# Patient Record
Sex: FEMALE | Race: WHITE | HISPANIC OR LATINO
[De-identification: names, ages, dates, MRNs, and addresses within clinical notes are randomized per-mention and may not be internally consistent; named-entity substitution may affect disease eponyms.]

---

## 2017-01-03 ENCOUNTER — APPOINTMENT (OUTPATIENT)
Dept: OBGYN | Facility: CLINIC | Age: 57
End: 2017-01-03

## 2017-01-03 ENCOUNTER — OUTPATIENT (OUTPATIENT)
Dept: OUTPATIENT SERVICES | Facility: HOSPITAL | Age: 57
LOS: 1 days | End: 2017-01-03
Payer: SELF-PAY

## 2017-01-03 VITALS — BODY MASS INDEX: 29.12 KG/M2 | DIASTOLIC BLOOD PRESSURE: 62 MMHG | WEIGHT: 175 LBS | SYSTOLIC BLOOD PRESSURE: 100 MMHG

## 2017-01-03 DIAGNOSIS — Z01.419 ENCOUNTER FOR GYNECOLOGICAL EXAMINATION (GENERAL) (ROUTINE) WITHOUT ABNORMAL FINDINGS: ICD-10-CM

## 2017-01-03 DIAGNOSIS — N76.0 ACUTE VAGINITIS: ICD-10-CM

## 2017-01-03 PROCEDURE — G0463: CPT

## 2017-01-09 ENCOUNTER — APPOINTMENT (OUTPATIENT)
Dept: FAMILY MEDICINE | Facility: HOSPITAL | Age: 57
End: 2017-01-09

## 2017-01-09 ENCOUNTER — OUTPATIENT (OUTPATIENT)
Dept: OUTPATIENT SERVICES | Facility: HOSPITAL | Age: 57
LOS: 1 days | End: 2017-01-09
Payer: SELF-PAY

## 2017-01-09 VITALS
SYSTOLIC BLOOD PRESSURE: 122 MMHG | BODY MASS INDEX: 28.32 KG/M2 | HEIGHT: 65 IN | WEIGHT: 170 LBS | HEART RATE: 86 BPM | RESPIRATION RATE: 14 BRPM | DIASTOLIC BLOOD PRESSURE: 80 MMHG

## 2017-01-09 PROCEDURE — G0463: CPT

## 2017-01-11 ENCOUNTER — RX RENEWAL (OUTPATIENT)
Age: 57
End: 2017-01-11

## 2017-01-13 ENCOUNTER — MEDICATION RENEWAL (OUTPATIENT)
Age: 57
End: 2017-01-13

## 2017-01-16 ENCOUNTER — MEDICATION RENEWAL (OUTPATIENT)
Age: 57
End: 2017-01-16

## 2017-01-26 ENCOUNTER — OUTPATIENT (OUTPATIENT)
Dept: OUTPATIENT SERVICES | Facility: HOSPITAL | Age: 57
LOS: 1 days | End: 2017-01-26
Payer: SELF-PAY

## 2017-01-26 ENCOUNTER — APPOINTMENT (OUTPATIENT)
Dept: DERMATOLOGY | Facility: HOSPITAL | Age: 57
End: 2017-01-26

## 2017-01-26 VITALS
HEIGHT: 65 IN | DIASTOLIC BLOOD PRESSURE: 79 MMHG | SYSTOLIC BLOOD PRESSURE: 120 MMHG | HEART RATE: 80 BPM | RESPIRATION RATE: 14 BRPM | BODY MASS INDEX: 28.32 KG/M2 | WEIGHT: 170 LBS

## 2017-01-26 DIAGNOSIS — L81.4 OTHER MELANIN HYPERPIGMENTATION: ICD-10-CM

## 2017-01-26 DIAGNOSIS — L30.9 DERMATITIS, UNSPECIFIED: ICD-10-CM

## 2017-01-26 DIAGNOSIS — L98.9 DISORDER OF THE SKIN AND SUBCUTANEOUS TISSUE, UNSPECIFIED: ICD-10-CM

## 2017-01-26 PROCEDURE — G0463: CPT

## 2017-01-26 RX ORDER — MICONAZOLE NITRATE 20 MG/G
2 CREAM TOPICAL TWICE DAILY
Qty: 1 | Refills: 1 | Status: DISCONTINUED | COMMUNITY
Start: 2017-01-09 | End: 2017-01-26

## 2017-01-28 ENCOUNTER — APPOINTMENT (OUTPATIENT)
Dept: MAMMOGRAPHY | Facility: IMAGING CENTER | Age: 57
End: 2017-01-28

## 2017-01-28 ENCOUNTER — OUTPATIENT (OUTPATIENT)
Dept: OUTPATIENT SERVICES | Facility: HOSPITAL | Age: 57
LOS: 1 days | End: 2017-01-28
Payer: COMMERCIAL

## 2017-01-28 DIAGNOSIS — C50.919 MALIGNANT NEOPLASM OF UNSPECIFIED SITE OF UNSPECIFIED FEMALE BREAST: ICD-10-CM

## 2017-01-28 PROCEDURE — 77063 BREAST TOMOSYNTHESIS BI: CPT

## 2017-01-28 PROCEDURE — 77067 SCR MAMMO BI INCL CAD: CPT

## 2017-03-30 ENCOUNTER — APPOINTMENT (OUTPATIENT)
Dept: FAMILY MEDICINE | Facility: HOSPITAL | Age: 57
End: 2017-03-30

## 2017-03-30 ENCOUNTER — OUTPATIENT (OUTPATIENT)
Dept: OUTPATIENT SERVICES | Facility: HOSPITAL | Age: 57
LOS: 1 days | End: 2017-03-30
Payer: SELF-PAY

## 2017-03-30 VITALS
WEIGHT: 168 LBS | BODY MASS INDEX: 27.96 KG/M2 | RESPIRATION RATE: 16 BRPM | TEMPERATURE: 97.8 F | OXYGEN SATURATION: 98 % | HEART RATE: 67 BPM | DIASTOLIC BLOOD PRESSURE: 70 MMHG | SYSTOLIC BLOOD PRESSURE: 113 MMHG

## 2017-03-30 PROCEDURE — 80061 LIPID PANEL: CPT

## 2017-03-30 PROCEDURE — 85025 COMPLETE CBC W/AUTO DIFF WBC: CPT

## 2017-03-30 PROCEDURE — 36415 COLL VENOUS BLD VENIPUNCTURE: CPT

## 2017-03-30 PROCEDURE — 83036 HEMOGLOBIN GLYCOSYLATED A1C: CPT

## 2017-03-30 PROCEDURE — 80053 COMPREHEN METABOLIC PANEL: CPT

## 2017-03-30 PROCEDURE — G0463: CPT

## 2017-03-30 PROCEDURE — 82306 VITAMIN D 25 HYDROXY: CPT

## 2017-04-11 ENCOUNTER — APPOINTMENT (OUTPATIENT)
Dept: GASTROENTEROLOGY | Facility: HOSPITAL | Age: 57
End: 2017-04-11

## 2017-04-11 ENCOUNTER — OUTPATIENT (OUTPATIENT)
Dept: OUTPATIENT SERVICES | Facility: HOSPITAL | Age: 57
LOS: 1 days | End: 2017-04-11
Payer: SELF-PAY

## 2017-04-11 VITALS
HEART RATE: 83 BPM | DIASTOLIC BLOOD PRESSURE: 78 MMHG | RESPIRATION RATE: 14 BRPM | HEIGHT: 65 IN | SYSTOLIC BLOOD PRESSURE: 118 MMHG | WEIGHT: 168 LBS | BODY MASS INDEX: 27.99 KG/M2

## 2017-04-11 DIAGNOSIS — Z08 ENCOUNTER FOR FOLLOW-UP EXAMINATION AFTER COMPLETED TREATMENT FOR MALIGNANT NEOPLASM: ICD-10-CM

## 2017-04-11 DIAGNOSIS — R10.9 UNSPECIFIED ABDOMINAL PAIN: ICD-10-CM

## 2017-04-11 DIAGNOSIS — K80.50 CALCULUS OF BILE DUCT WITHOUT CHOLANGITIS OR CHOLECYSTITIS WITHOUT OBSTRUCTION: ICD-10-CM

## 2017-04-11 DIAGNOSIS — K31.9 DISEASE OF STOMACH AND DUODENUM, UNSPECIFIED: ICD-10-CM

## 2017-04-11 PROCEDURE — G0463: CPT

## 2017-04-11 RX ORDER — MAGNESIUM CITRATE
SOLUTION, ORAL ORAL
Qty: 2 | Refills: 0 | Status: DISCONTINUED | COMMUNITY
Start: 2017-04-11 | End: 2017-04-11

## 2017-04-11 RX ORDER — BISACODYL 5 MG/1
5 TABLET ORAL
Qty: 2 | Refills: 0 | Status: DISCONTINUED | COMMUNITY
Start: 2017-04-11 | End: 2017-04-11

## 2017-04-26 ENCOUNTER — APPOINTMENT (OUTPATIENT)
Dept: FAMILY MEDICINE | Facility: HOSPITAL | Age: 57
End: 2017-04-26

## 2017-04-26 ENCOUNTER — OUTPATIENT (OUTPATIENT)
Dept: OUTPATIENT SERVICES | Facility: HOSPITAL | Age: 57
LOS: 1 days | End: 2017-04-26
Payer: SELF-PAY

## 2017-04-26 VITALS
HEART RATE: 70 BPM | BODY MASS INDEX: 28.82 KG/M2 | RESPIRATION RATE: 16 BRPM | TEMPERATURE: 97.1 F | OXYGEN SATURATION: 98 % | SYSTOLIC BLOOD PRESSURE: 135 MMHG | HEIGHT: 65 IN | WEIGHT: 173 LBS | DIASTOLIC BLOOD PRESSURE: 88 MMHG

## 2017-04-26 PROCEDURE — G0463: CPT

## 2017-05-17 ENCOUNTER — OUTPATIENT (OUTPATIENT)
Dept: OUTPATIENT SERVICES | Facility: HOSPITAL | Age: 57
LOS: 1 days | End: 2017-05-17
Payer: SELF-PAY

## 2017-05-17 ENCOUNTER — RESULT REVIEW (OUTPATIENT)
Age: 57
End: 2017-05-17

## 2017-05-17 DIAGNOSIS — R10.9 UNSPECIFIED ABDOMINAL PAIN: ICD-10-CM

## 2017-05-17 PROCEDURE — 88312 SPECIAL STAINS GROUP 1: CPT

## 2017-05-17 PROCEDURE — 88305 TISSUE EXAM BY PATHOLOGIST: CPT

## 2017-05-17 PROCEDURE — 43239 EGD BIOPSY SINGLE/MULTIPLE: CPT

## 2017-05-17 PROCEDURE — 88305 TISSUE EXAM BY PATHOLOGIST: CPT | Mod: 26

## 2017-05-17 PROCEDURE — 88312 SPECIAL STAINS GROUP 1: CPT | Mod: 26

## 2017-05-23 ENCOUNTER — OTHER (OUTPATIENT)
Age: 57
End: 2017-05-23

## 2017-06-20 ENCOUNTER — APPOINTMENT (OUTPATIENT)
Dept: GASTROENTEROLOGY | Facility: HOSPITAL | Age: 57
End: 2017-06-20

## 2017-06-20 ENCOUNTER — OUTPATIENT (OUTPATIENT)
Dept: OUTPATIENT SERVICES | Facility: HOSPITAL | Age: 57
LOS: 1 days | End: 2017-06-20
Payer: SELF-PAY

## 2017-06-20 VITALS
HEART RATE: 80 BPM | WEIGHT: 176 LBS | RESPIRATION RATE: 16 BRPM | BODY MASS INDEX: 29.32 KG/M2 | HEIGHT: 65 IN | SYSTOLIC BLOOD PRESSURE: 129 MMHG | DIASTOLIC BLOOD PRESSURE: 80 MMHG

## 2017-06-20 DIAGNOSIS — R10.9 UNSPECIFIED ABDOMINAL PAIN: ICD-10-CM

## 2017-06-20 DIAGNOSIS — E66.9 OBESITY, UNSPECIFIED: ICD-10-CM

## 2017-06-20 DIAGNOSIS — K80.50 CALCULUS OF BILE DUCT WITHOUT CHOLANGITIS OR CHOLECYSTITIS WITHOUT OBSTRUCTION: ICD-10-CM

## 2017-06-20 DIAGNOSIS — K76.0 FATTY (CHANGE OF) LIVER, NOT ELSEWHERE CLASSIFIED: ICD-10-CM

## 2017-06-20 DIAGNOSIS — K31.9 DISEASE OF STOMACH AND DUODENUM, UNSPECIFIED: ICD-10-CM

## 2017-06-20 DIAGNOSIS — A04.8 OTHER SPECIFIED BACTERIAL INTESTINAL INFECTIONS: ICD-10-CM

## 2017-06-20 PROCEDURE — G0463: CPT

## 2017-06-20 RX ORDER — CLARITHROMYCIN 500 MG/1
500 TABLET, FILM COATED ORAL TWICE DAILY
Qty: 28 | Refills: 0 | Status: DISCONTINUED | COMMUNITY
Start: 2017-05-23 | End: 2017-06-20

## 2017-06-20 RX ORDER — AMOXICILLIN 500 MG/1
500 CAPSULE ORAL TWICE DAILY
Qty: 56 | Refills: 0 | Status: DISCONTINUED | COMMUNITY
Start: 2017-05-23 | End: 2017-06-20

## 2017-06-21 LAB
HBA1C MFR BLD HPLC: 6.2
LDLC SERPL DIRECT ASSAY-MCNC: 88

## 2017-07-20 ENCOUNTER — APPOINTMENT (OUTPATIENT)
Dept: OTOLARYNGOLOGY | Facility: CLINIC | Age: 57
End: 2017-07-20

## 2017-07-20 ENCOUNTER — OUTPATIENT (OUTPATIENT)
Dept: OUTPATIENT SERVICES | Facility: HOSPITAL | Age: 57
LOS: 1 days | End: 2017-07-20
Payer: SELF-PAY

## 2017-07-20 ENCOUNTER — OUTPATIENT (OUTPATIENT)
Dept: OUTPATIENT SERVICES | Facility: HOSPITAL | Age: 57
LOS: 1 days | Discharge: HOME | End: 2017-07-20

## 2017-07-20 VITALS
HEIGHT: 65 IN | DIASTOLIC BLOOD PRESSURE: 85 MMHG | SYSTOLIC BLOOD PRESSURE: 125 MMHG | WEIGHT: 172 LBS | HEART RATE: 80 BPM | BODY MASS INDEX: 28.66 KG/M2

## 2017-07-20 DIAGNOSIS — A04.8 OTHER SPECIFIED BACTERIAL INTESTINAL INFECTIONS: ICD-10-CM

## 2017-07-20 DIAGNOSIS — R10.9 UNSPECIFIED ABDOMINAL PAIN: ICD-10-CM

## 2017-07-20 DIAGNOSIS — K76.0 FATTY (CHANGE OF) LIVER, NOT ELSEWHERE CLASSIFIED: ICD-10-CM

## 2017-07-20 DIAGNOSIS — K80.50 CALCULUS OF BILE DUCT WITHOUT CHOLANGITIS OR CHOLECYSTITIS WITHOUT OBSTRUCTION: ICD-10-CM

## 2017-07-20 DIAGNOSIS — E66.9 OBESITY, UNSPECIFIED: ICD-10-CM

## 2017-07-20 PROCEDURE — 87338 HPYLORI STOOL AG IA: CPT

## 2017-07-23 LAB — H PYLORI AG STL QL: SIGNIFICANT CHANGE UP

## 2017-08-07 ENCOUNTER — MEDICATION RENEWAL (OUTPATIENT)
Age: 57
End: 2017-08-07

## 2017-08-08 ENCOUNTER — APPOINTMENT (OUTPATIENT)
Dept: GASTROENTEROLOGY | Facility: HOSPITAL | Age: 57
End: 2017-08-08

## 2017-08-08 ENCOUNTER — OUTPATIENT (OUTPATIENT)
Dept: OUTPATIENT SERVICES | Facility: HOSPITAL | Age: 57
LOS: 1 days | End: 2017-08-08
Payer: SELF-PAY

## 2017-08-08 VITALS
BODY MASS INDEX: 28.49 KG/M2 | SYSTOLIC BLOOD PRESSURE: 131 MMHG | HEART RATE: 89 BPM | DIASTOLIC BLOOD PRESSURE: 85 MMHG | HEIGHT: 65 IN | WEIGHT: 171 LBS

## 2017-08-08 DIAGNOSIS — R10.9 UNSPECIFIED ABDOMINAL PAIN: ICD-10-CM

## 2017-08-08 DIAGNOSIS — K21.9 GASTRO-ESOPHAGEAL REFLUX DISEASE WITHOUT ESOPHAGITIS: ICD-10-CM

## 2017-08-08 PROCEDURE — G0463: CPT

## 2017-08-10 ENCOUNTER — APPOINTMENT (OUTPATIENT)
Dept: FAMILY MEDICINE | Facility: HOSPITAL | Age: 57
End: 2017-08-10

## 2017-08-10 ENCOUNTER — OUTPATIENT (OUTPATIENT)
Dept: OUTPATIENT SERVICES | Facility: HOSPITAL | Age: 57
LOS: 1 days | End: 2017-08-10
Payer: SELF-PAY

## 2017-08-10 VITALS
DIASTOLIC BLOOD PRESSURE: 81 MMHG | RESPIRATION RATE: 15 BRPM | HEIGHT: 65 IN | WEIGHT: 170 LBS | TEMPERATURE: 97.4 F | SYSTOLIC BLOOD PRESSURE: 119 MMHG | HEART RATE: 78 BPM | BODY MASS INDEX: 28.32 KG/M2 | OXYGEN SATURATION: 97 %

## 2017-08-10 PROCEDURE — G0463: CPT

## 2017-09-06 ENCOUNTER — EMERGENCY (EMERGENCY)
Facility: HOSPITAL | Age: 57
LOS: 1 days | Discharge: HOME | End: 2017-09-06
Attending: EMERGENCY MEDICINE | Admitting: EMERGENCY MEDICINE
Payer: SELF-PAY

## 2017-09-06 VITALS
TEMPERATURE: 98 F | SYSTOLIC BLOOD PRESSURE: 114 MMHG | HEART RATE: 64 BPM | DIASTOLIC BLOOD PRESSURE: 75 MMHG | OXYGEN SATURATION: 97 % | RESPIRATION RATE: 17 BRPM

## 2017-09-06 VITALS
DIASTOLIC BLOOD PRESSURE: 63 MMHG | HEART RATE: 74 BPM | WEIGHT: 169.98 LBS | RESPIRATION RATE: 16 BRPM | OXYGEN SATURATION: 99 % | TEMPERATURE: 97 F | SYSTOLIC BLOOD PRESSURE: 144 MMHG

## 2017-09-06 DIAGNOSIS — Q24.5 MALFORMATION OF CORONARY VESSELS: ICD-10-CM

## 2017-09-06 LAB
ALBUMIN SERPL ELPH-MCNC: 4.4 G/DL — SIGNIFICANT CHANGE UP (ref 3.3–5)
ALP SERPL-CCNC: 88 U/L — SIGNIFICANT CHANGE UP (ref 40–120)
ALT FLD-CCNC: 18 U/L RC — SIGNIFICANT CHANGE UP (ref 10–45)
ANION GAP SERPL CALC-SCNC: 17 MMOL/L — SIGNIFICANT CHANGE UP (ref 5–17)
AST SERPL-CCNC: 19 U/L — SIGNIFICANT CHANGE UP (ref 10–40)
BILIRUB SERPL-MCNC: 0.2 MG/DL — SIGNIFICANT CHANGE UP (ref 0.2–1.2)
BUN SERPL-MCNC: 11 MG/DL — SIGNIFICANT CHANGE UP (ref 7–23)
CALCIUM SERPL-MCNC: 9.8 MG/DL — SIGNIFICANT CHANGE UP (ref 8.4–10.5)
CHLORIDE SERPL-SCNC: 100 MMOL/L — SIGNIFICANT CHANGE UP (ref 96–108)
CK MB CFR SERPL CALC: 1.1 NG/ML — SIGNIFICANT CHANGE UP (ref 0–3.8)
CK MB CFR SERPL CALC: <1 NG/ML — SIGNIFICANT CHANGE UP (ref 0–3.8)
CK SERPL-CCNC: 74 U/L — SIGNIFICANT CHANGE UP (ref 25–170)
CK SERPL-CCNC: 79 U/L — SIGNIFICANT CHANGE UP (ref 25–170)
CO2 SERPL-SCNC: 25 MMOL/L — SIGNIFICANT CHANGE UP (ref 22–31)
CREAT SERPL-MCNC: 0.65 MG/DL — SIGNIFICANT CHANGE UP (ref 0.5–1.3)
GLUCOSE SERPL-MCNC: 104 MG/DL — HIGH (ref 70–99)
HCT VFR BLD CALC: 40.4 % — SIGNIFICANT CHANGE UP (ref 34.5–45)
HGB BLD-MCNC: 14.1 G/DL — SIGNIFICANT CHANGE UP (ref 11.5–15.5)
MAGNESIUM SERPL-MCNC: 2.1 MG/DL — SIGNIFICANT CHANGE UP (ref 1.6–2.6)
MCHC RBC-ENTMCNC: 32.5 PG — SIGNIFICANT CHANGE UP (ref 27–34)
MCHC RBC-ENTMCNC: 35 GM/DL — SIGNIFICANT CHANGE UP (ref 32–36)
MCV RBC AUTO: 92.9 FL — SIGNIFICANT CHANGE UP (ref 80–100)
PLATELET # BLD AUTO: 220 K/UL — SIGNIFICANT CHANGE UP (ref 150–400)
POTASSIUM SERPL-MCNC: 3.7 MMOL/L — SIGNIFICANT CHANGE UP (ref 3.5–5.3)
POTASSIUM SERPL-SCNC: 3.7 MMOL/L — SIGNIFICANT CHANGE UP (ref 3.5–5.3)
PROT SERPL-MCNC: 7.7 G/DL — SIGNIFICANT CHANGE UP (ref 6–8.3)
RBC # BLD: 4.35 M/UL — SIGNIFICANT CHANGE UP (ref 3.8–5.2)
RBC # FLD: 11.5 % — SIGNIFICANT CHANGE UP (ref 10.3–14.5)
SODIUM SERPL-SCNC: 142 MMOL/L — SIGNIFICANT CHANGE UP (ref 135–145)
TROPONIN T SERPL-MCNC: <0.01 NG/ML — SIGNIFICANT CHANGE UP (ref 0–0.06)
TROPONIN T SERPL-MCNC: <0.01 NG/ML — SIGNIFICANT CHANGE UP (ref 0–0.06)
WBC # BLD: 5.9 K/UL — SIGNIFICANT CHANGE UP (ref 3.8–10.5)
WBC # FLD AUTO: 5.9 K/UL — SIGNIFICANT CHANGE UP (ref 3.8–10.5)

## 2017-09-06 PROCEDURE — 71046 X-RAY EXAM CHEST 2 VIEWS: CPT

## 2017-09-06 PROCEDURE — 99285 EMERGENCY DEPT VISIT HI MDM: CPT | Mod: GC

## 2017-09-06 PROCEDURE — 80053 COMPREHEN METABOLIC PANEL: CPT

## 2017-09-06 PROCEDURE — 83735 ASSAY OF MAGNESIUM: CPT

## 2017-09-06 PROCEDURE — 93010 ELECTROCARDIOGRAM REPORT: CPT

## 2017-09-06 PROCEDURE — 82553 CREATINE MB FRACTION: CPT

## 2017-09-06 PROCEDURE — 71020: CPT | Mod: 26

## 2017-09-06 PROCEDURE — 85027 COMPLETE CBC AUTOMATED: CPT

## 2017-09-06 PROCEDURE — 82550 ASSAY OF CK (CPK): CPT

## 2017-09-06 PROCEDURE — 99283 EMERGENCY DEPT VISIT LOW MDM: CPT | Mod: 25

## 2017-09-06 PROCEDURE — 93005 ELECTROCARDIOGRAM TRACING: CPT

## 2017-09-06 PROCEDURE — 99285 EMERGENCY DEPT VISIT HI MDM: CPT | Mod: 25

## 2017-09-06 PROCEDURE — 84484 ASSAY OF TROPONIN QUANT: CPT

## 2017-09-06 RX ORDER — ASPIRIN/CALCIUM CARB/MAGNESIUM 324 MG
325 TABLET ORAL ONCE
Qty: 0 | Refills: 0 | Status: COMPLETED | OUTPATIENT
Start: 2017-09-06 | End: 2017-09-06

## 2017-09-06 RX ADMIN — Medication 325 MILLIGRAM(S): at 14:56

## 2017-09-06 NOTE — ED PROVIDER NOTE - OBJECTIVE STATEMENT
57 year old woman PMH DM2, DLD, breast cancer s/p chemo p/w left arm pain. Says that for past 3 hours has had left arm and left chest heaviness, tingling. Called her cardiologist and was told to go to ED. No recent exertional symptoms, cough, fever/chills, orthopnea, LE edema. Had negative nuclear stress test 1/2016. No VTE risk factors.

## 2017-09-06 NOTE — CONSULT NOTE ADULT - ATTENDING COMMENTS
57 year old woman with known LAD intramyocardial bridge, found incidentally on evaluation a year ago for atypical chest pain. Symptoms from that time have not been recurrent and is no specific management, has required no specific management. Current symptoms are different, not characteristic of angina and not related to the myocardial bridge. No further cardiac testing or change in cardiac management indicated.

## 2017-09-06 NOTE — ED ADULT NURSE NOTE - PSH
History of Appendectomy    History of Right Mastectomy    S/P Breast Reconstruction  2006  S/P Sclerotherapy of Varicose Veins

## 2017-09-06 NOTE — CONSULT NOTE ADULT - ASSESSMENT
57 year old Female with mid LAD intramyocardial bridging on metoprolol succinate, T2DM, HLD presented with Left arm, Left thorax, left facial numbness and tingling.

## 2017-09-06 NOTE — ED PROVIDER NOTE - PROGRESS NOTE DETAILS
d/w cardiology fellow will see pt seen by cardiology clear for d/c pt unwilling to wait for troponin result. understands that there is a chance that the troponin could be elevated which would require further intervention. understands that leaving puts her at risk of serious cardiac event or death. pt still requesting to leave. will sign out AMA.

## 2017-09-06 NOTE — ED ADULT NURSE NOTE - OBJECTIVE STATEMENT
57 y.o female arrived to ED c.o tingling to left arm x3 hours. Pt states she also has tingling in her chest and head but feels it most significantly in arm. Pt a&ox4, neg slurred speech, neg facial droop, neg extremity drift, equal  strength, neg sob, neg chest pain, abd soft nontender, pulse motor sensoryx4, pt states that when she ambulates she drifts to the right, skin warm dry intact. 18g IV placed MD YULIYA assessing pt at bedside,

## 2017-09-06 NOTE — ED PROVIDER NOTE - ATTENDING CONTRIBUTION TO CARE
Dr. Baum : I have personally seen and examined this patient at the bedside. I have fully participated in the care of this patient. I have reviewed all pertinent clinical information, including history, physical exam, plan and the Fellow's note and agree except as noted.     56yo F hx of DM, HLD, CAD , breast ca in remission p/w left arm numbness/tingling and left sided cp that last few seconds 3 hours ago.   Denies f/c/n/v/sob/palpitations/cough/abd.pain/d/c/dysuria/hematuria. no sick contacts/recent travel. no hx of dvt/pe    PE:  head; atraumatic normocephalic  eyes: perrla  Heart: rrr s1s2  lungs: ctab  abd: soft, nt nd + bs no rebound/guarding no cva ttp  le: no swelling no calf ttp ue: strength 5/5 in upper ext  back: no midline cervical/thoracic/lumbar ttp  neuro: no focal neuro deficit    -->acs vs pna unlikely PE no risk factors no sob; unlikely dissection radial pulse 2+ bl no cp radiating to the back VS wnl---will fu 2 trops; ekg wnl--plan if 2 trops neg to dc with outpatient Cardiology follow up

## 2017-09-06 NOTE — CONSULT NOTE ADULT - SUBJECTIVE AND OBJECTIVE BOX
HPI: 57 year old Female with Mid LAD intramyocardial bridging metoprolol, T2DM on metformin, HLD on simvastatin, hx of breast cancer s/p chemotherapy 2006 with R. mastectomy p/w L arm, L thorax, L facial tingling. Patient stated numbness and tingling started 3 hours ago, lasted for few seconds, resolved on its own. Symptoms were assocated with left arm weakness that resolved as well. Denied prior episodes. Patient called her cardiologist who told her to come to ED. Patient denied chest pain, diaphoresis, palpitations, SOB, dyspnea, leg swelling, visual changes, slurred speech, back pain, f/c/n/v/d. Pt follows Dr. Hawthorne outpatient for known mLAD myocardial bridging, has a follow up appointment on the 9/14. Last treadmill stress test in 1/16 showed no arrythmia or significant EKG changes from baseline. Previous Echo in 11/15 showed preserved EF with normal LV function.    PAST MEDICAL & SURGICAL HISTORY:  CAD (coronary artery disease)  High Cholesterol  Breast Cancer: 2006 right mastectomy,  followed by chemo  Diabetes Mellitus Type II  S/P Sclerotherapy of Varicose Veins  S/P Breast Reconstruction: 2006  History of Appendectomy  History of Right Mastectomy    FAMILY HISTORY:  Family history of colon cancer (Mother)  No pertinent cardiac history    SOCIAL HISTORY:  Denied alcohol, smoking, drug use    HOME MEDICATIONS:  Metoprolol 12.5 mg BID  Simvastatin 10 mg daily  Metformin 1000 mg BID  Escitalopram 5 mg daily    REVIEW OF SYSTEMS:    CONSTITUTIONAL: No weakness, fevers or chills  EYES/ENT: No visual changes;  No vertigo or throat pain   NECK: No pain or stiffness  RESPIRATORY: No cough, wheezing, hemoptysis; No shortness of breath  CARDIOVASCULAR: No chest pain or palpitations  GASTROINTESTINAL: No abdominal or epigastric pain. No nausea, vomiting, or hematemesis; No diarrhea or constipation. No melena or hematochezia.  GENITOURINARY: No dysuria, frequency or hematuria  NEUROLOGICAL: No numbness or weakness  SKIN: No itching, burning, rashes, or lesions   All other review of systems is negative unless indicated above.    PHYSICAL EXAM:    Vital Signs Last 24 Hrs  T(C): 36.7 (06 Sep 2017 15:59), Max: 36.7 (06 Sep 2017 15:59)  T(F): 98 (06 Sep 2017 15:59), Max: 98 (06 Sep 2017 15:59)  HR: 64 (06 Sep 2017 15:59) (64 - 74)  BP: 114/75 (06 Sep 2017 15:59) (114/75 - 146/86)  RR: 17 (06 Sep 2017 15:59) (16 - 18)  SpO2: 97% (06 Sep 2017 15:59) (97% - 99%)      General:  NAD  Neurology: A&Ox3, nonfocal, CN II-XII intact  Eyes: PERRLA/ EOMI, Gross vision intact  ENT/Neck: Neck supple, trachea midline, No JVD, Gross hearing intact  Respiratory: CTA B/L, No wheezing, rales, rhonchi  CV: RRR, S1S2, no murmurs, rubs or gallops  Abdominal: Soft, NT, ND +BS,   Extremities: No edema, + peripheral pulses  Skin: No Rashes, Hematoma, Ecchymosis    LABS:                        14.1   5.9   )-----------( 220      ( 06 Sep 2017 14:44 )             40.4     09-06    142  |  100  |  11  ----------------------------<  104<H>  3.7   |  25  |  0.65    Ca    9.8      06 Sep 2017 14:44  Mg     2.1     09-06    TPro  7.7  /  Alb  4.4  /  TBili  0.2  /  DBili  x   /  AST  19  /  ALT  18  /  AlkPhos  88  09-06    CARDIAC MARKERS ( 06 Sep 2017 14:44 )  x     / x     / 79 U/L / x     / 1.1 ng/mL    EKG:  Normal Sinus Rhythm, no ST wave abnormalities or T-wave inversions noted

## 2017-09-06 NOTE — CONSULT NOTE ADULT - PROBLEM SELECTOR RECOMMENDATION 9
Continue metoprolol succinate 12.5 mg BID  EKG WNL  Cardiac markers pending, CXR pending  No further cardiac work up     Brittany Alcala, PGY-1  Pager #[phone]

## 2017-09-06 NOTE — ED ADULT NURSE NOTE - PMH
Breast Cancer  2006 right mastectomy,  followed by chemo  CAD (coronary artery disease)    Diabetes Mellitus Type II    High Cholesterol

## 2017-09-07 ENCOUNTER — OUTPATIENT (OUTPATIENT)
Dept: OUTPATIENT SERVICES | Facility: HOSPITAL | Age: 57
LOS: 1 days | Discharge: HOME | End: 2017-09-07

## 2017-09-07 DIAGNOSIS — C50.919 MALIGNANT NEOPLASM OF UNSPECIFIED SITE OF UNSPECIFIED FEMALE BREAST: ICD-10-CM

## 2017-09-11 ENCOUNTER — RESULT REVIEW (OUTPATIENT)
Age: 57
End: 2017-09-11

## 2017-09-11 ENCOUNTER — APPOINTMENT (OUTPATIENT)
Dept: HEMATOLOGY ONCOLOGY | Facility: CLINIC | Age: 57
End: 2017-09-11

## 2017-09-11 VITALS
HEART RATE: 64 BPM | TEMPERATURE: 98.3 F | OXYGEN SATURATION: 99 % | DIASTOLIC BLOOD PRESSURE: 76 MMHG | SYSTOLIC BLOOD PRESSURE: 124 MMHG | WEIGHT: 172.4 LBS | RESPIRATION RATE: 16 BRPM | BODY MASS INDEX: 28.69 KG/M2

## 2017-09-11 LAB
ALBUMIN SERPL ELPH-MCNC: 4.6 G/DL — SIGNIFICANT CHANGE UP (ref 3.3–5)
ALP SERPL-CCNC: 82 U/L — SIGNIFICANT CHANGE UP (ref 40–120)
ALT FLD-CCNC: 15 U/L — SIGNIFICANT CHANGE UP (ref 10–45)
ANION GAP SERPL CALC-SCNC: 15 MMOL/L — SIGNIFICANT CHANGE UP (ref 5–17)
AST SERPL-CCNC: 15 U/L — SIGNIFICANT CHANGE UP (ref 10–40)
BASOPHILS # BLD AUTO: 0.1 K/UL — SIGNIFICANT CHANGE UP (ref 0–0.2)
BASOPHILS NFR BLD AUTO: 1.1 % — SIGNIFICANT CHANGE UP (ref 0–2)
BILIRUB SERPL-MCNC: 0.2 MG/DL — SIGNIFICANT CHANGE UP (ref 0.2–1.2)
BUN SERPL-MCNC: 15 MG/DL — SIGNIFICANT CHANGE UP (ref 7–23)
CALCIUM SERPL-MCNC: 9.3 MG/DL — SIGNIFICANT CHANGE UP (ref 8.4–10.5)
CHLORIDE SERPL-SCNC: 104 MMOL/L — SIGNIFICANT CHANGE UP (ref 96–108)
CO2 SERPL-SCNC: 24 MMOL/L — SIGNIFICANT CHANGE UP (ref 22–31)
CREAT SERPL-MCNC: 0.63 MG/DL — SIGNIFICANT CHANGE UP (ref 0.5–1.3)
EOSINOPHIL # BLD AUTO: 0 K/UL — SIGNIFICANT CHANGE UP (ref 0–0.5)
EOSINOPHIL NFR BLD AUTO: 0.7 % — SIGNIFICANT CHANGE UP (ref 0–6)
GLUCOSE SERPL-MCNC: 96 MG/DL — SIGNIFICANT CHANGE UP (ref 70–99)
LYMPHOCYTES # BLD AUTO: 2.1 K/UL — SIGNIFICANT CHANGE UP (ref 1–3.3)
LYMPHOCYTES # BLD AUTO: 35.8 % — SIGNIFICANT CHANGE UP (ref 13–44)
MONOCYTES # BLD AUTO: 0.4 K/UL — SIGNIFICANT CHANGE UP (ref 0–0.9)
MONOCYTES NFR BLD AUTO: 6.7 % — SIGNIFICANT CHANGE UP (ref 2–14)
NEUTROPHILS # BLD AUTO: 3.3 K/UL — SIGNIFICANT CHANGE UP (ref 1.8–7.4)
NEUTROPHILS NFR BLD AUTO: 55.8 % — SIGNIFICANT CHANGE UP (ref 43–77)
POTASSIUM SERPL-MCNC: 4.5 MMOL/L — SIGNIFICANT CHANGE UP (ref 3.5–5.3)
POTASSIUM SERPL-SCNC: 4.5 MMOL/L — SIGNIFICANT CHANGE UP (ref 3.5–5.3)
PROT SERPL-MCNC: 7.4 G/DL — SIGNIFICANT CHANGE UP (ref 6–8.3)
SODIUM SERPL-SCNC: 143 MMOL/L — SIGNIFICANT CHANGE UP (ref 135–145)

## 2017-09-14 ENCOUNTER — APPOINTMENT (OUTPATIENT)
Dept: FAMILY MEDICINE | Facility: HOSPITAL | Age: 57
End: 2017-09-14

## 2017-09-14 ENCOUNTER — APPOINTMENT (OUTPATIENT)
Dept: CARDIOLOGY | Facility: HOSPITAL | Age: 57
End: 2017-09-14

## 2017-09-14 ENCOUNTER — MED ADMIN CHARGE (OUTPATIENT)
Age: 57
End: 2017-09-14

## 2017-09-14 ENCOUNTER — NON-APPOINTMENT (OUTPATIENT)
Age: 57
End: 2017-09-14

## 2017-09-14 ENCOUNTER — OUTPATIENT (OUTPATIENT)
Dept: OUTPATIENT SERVICES | Facility: HOSPITAL | Age: 57
LOS: 1 days | End: 2017-09-14
Payer: SELF-PAY

## 2017-09-14 VITALS
BODY MASS INDEX: 27.99 KG/M2 | HEIGHT: 65 IN | HEART RATE: 78 BPM | DIASTOLIC BLOOD PRESSURE: 78 MMHG | SYSTOLIC BLOOD PRESSURE: 114 MMHG | WEIGHT: 168 LBS | OXYGEN SATURATION: 98 %

## 2017-09-14 VITALS
DIASTOLIC BLOOD PRESSURE: 81 MMHG | BODY MASS INDEX: 27.79 KG/M2 | RESPIRATION RATE: 16 BRPM | HEART RATE: 67 BPM | TEMPERATURE: 98.7 F | OXYGEN SATURATION: 98 % | WEIGHT: 167 LBS | SYSTOLIC BLOOD PRESSURE: 118 MMHG

## 2017-09-14 DIAGNOSIS — I25.10 ATHEROSCLEROTIC HEART DISEASE OF NATIVE CORONARY ARTERY WITHOUT ANGINA PECTORIS: ICD-10-CM

## 2017-09-14 PROCEDURE — 93005 ELECTROCARDIOGRAM TRACING: CPT

## 2017-09-14 PROCEDURE — 80061 LIPID PANEL: CPT

## 2017-09-14 PROCEDURE — G0463: CPT

## 2017-09-14 PROCEDURE — G0008: CPT

## 2017-09-14 PROCEDURE — 84443 ASSAY THYROID STIM HORMONE: CPT

## 2017-09-14 PROCEDURE — 82607 VITAMIN B-12: CPT

## 2017-09-14 PROCEDURE — 83036 HEMOGLOBIN GLYCOSYLATED A1C: CPT

## 2017-09-14 PROCEDURE — 36415 COLL VENOUS BLD VENIPUNCTURE: CPT

## 2017-09-15 DIAGNOSIS — R00.2 PALPITATIONS: ICD-10-CM

## 2017-09-15 DIAGNOSIS — R07.89 OTHER CHEST PAIN: ICD-10-CM

## 2017-09-15 DIAGNOSIS — E11.9 TYPE 2 DIABETES MELLITUS WITHOUT COMPLICATIONS: ICD-10-CM

## 2017-09-18 ENCOUNTER — APPOINTMENT (OUTPATIENT)
Dept: OTOLARYNGOLOGY | Facility: CLINIC | Age: 57
End: 2017-09-18
Payer: COMMERCIAL

## 2017-09-18 ENCOUNTER — OUTPATIENT (OUTPATIENT)
Dept: OUTPATIENT SERVICES | Facility: HOSPITAL | Age: 57
LOS: 1 days | Discharge: HOME | End: 2017-09-18

## 2017-09-18 VITALS
SYSTOLIC BLOOD PRESSURE: 117 MMHG | DIASTOLIC BLOOD PRESSURE: 73 MMHG | HEIGHT: 65 IN | HEART RATE: 89 BPM | WEIGHT: 168 LBS | BODY MASS INDEX: 27.99 KG/M2

## 2017-09-18 PROCEDURE — 99213 OFFICE O/P EST LOW 20 MIN: CPT

## 2017-09-18 RX ORDER — OMEPRAZOLE 20 MG/1
20 CAPSULE, DELAYED RELEASE ORAL TWICE DAILY
Qty: 28 | Refills: 0 | Status: DISCONTINUED | COMMUNITY
Start: 2017-05-23 | End: 2017-09-18

## 2017-09-18 RX ORDER — TRIAMCINOLONE ACETONIDE 1 MG/G
0.1 CREAM TOPICAL
Qty: 1 | Refills: 1 | Status: DISCONTINUED | COMMUNITY
Start: 2017-01-26 | End: 2017-09-18

## 2017-09-25 ENCOUNTER — APPOINTMENT (OUTPATIENT)
Dept: FAMILY MEDICINE | Facility: HOSPITAL | Age: 57
End: 2017-09-25

## 2017-09-25 ENCOUNTER — OUTPATIENT (OUTPATIENT)
Dept: OUTPATIENT SERVICES | Facility: HOSPITAL | Age: 57
LOS: 1 days | End: 2017-09-25
Payer: SELF-PAY

## 2017-09-25 VITALS
SYSTOLIC BLOOD PRESSURE: 119 MMHG | OXYGEN SATURATION: 96 % | DIASTOLIC BLOOD PRESSURE: 83 MMHG | HEART RATE: 96 BPM | WEIGHT: 168 LBS | BODY MASS INDEX: 27.96 KG/M2 | RESPIRATION RATE: 16 BRPM | TEMPERATURE: 98.4 F

## 2017-09-25 PROCEDURE — G0463: CPT

## 2017-09-26 ENCOUNTER — EMERGENCY (EMERGENCY)
Facility: HOSPITAL | Age: 57
LOS: 1 days | Discharge: HOME | End: 2017-09-26
Attending: EMERGENCY MEDICINE | Admitting: EMERGENCY MEDICINE
Payer: SELF-PAY

## 2017-09-26 VITALS
OXYGEN SATURATION: 97 % | RESPIRATION RATE: 18 BRPM | TEMPERATURE: 98 F | HEART RATE: 86 BPM | DIASTOLIC BLOOD PRESSURE: 82 MMHG | SYSTOLIC BLOOD PRESSURE: 126 MMHG

## 2017-09-26 DIAGNOSIS — K21.9 GASTRO-ESOPHAGEAL REFLUX DISEASE WITHOUT ESOPHAGITIS: ICD-10-CM

## 2017-09-26 LAB
ALBUMIN SERPL ELPH-MCNC: 4 G/DL — SIGNIFICANT CHANGE UP (ref 3.3–5)
ALP SERPL-CCNC: 76 U/L — SIGNIFICANT CHANGE UP (ref 40–120)
ALT FLD-CCNC: 19 U/L RC — SIGNIFICANT CHANGE UP (ref 10–45)
ANION GAP SERPL CALC-SCNC: 13 MMOL/L — SIGNIFICANT CHANGE UP (ref 5–17)
APTT BLD: 28.9 SEC — SIGNIFICANT CHANGE UP (ref 27.5–37.4)
AST SERPL-CCNC: 19 U/L — SIGNIFICANT CHANGE UP (ref 10–40)
BASOPHILS # BLD AUTO: 0 K/UL — SIGNIFICANT CHANGE UP (ref 0–0.2)
BASOPHILS NFR BLD AUTO: 0.2 % — SIGNIFICANT CHANGE UP (ref 0–2)
BILIRUB SERPL-MCNC: 0.2 MG/DL — SIGNIFICANT CHANGE UP (ref 0.2–1.2)
BUN SERPL-MCNC: 20 MG/DL — SIGNIFICANT CHANGE UP (ref 7–23)
CALCIUM SERPL-MCNC: 9.5 MG/DL — SIGNIFICANT CHANGE UP (ref 8.4–10.5)
CHLORIDE SERPL-SCNC: 105 MMOL/L — SIGNIFICANT CHANGE UP (ref 96–108)
CK MB CFR SERPL CALC: 1.2 NG/ML — SIGNIFICANT CHANGE UP (ref 0–3.8)
CK SERPL-CCNC: 72 U/L — SIGNIFICANT CHANGE UP (ref 25–170)
CO2 SERPL-SCNC: 24 MMOL/L — SIGNIFICANT CHANGE UP (ref 22–31)
CREAT SERPL-MCNC: 0.72 MG/DL — SIGNIFICANT CHANGE UP (ref 0.5–1.3)
D DIMER BLD IA.RAPID-MCNC: <150 NG/ML DDU — SIGNIFICANT CHANGE UP
EOSINOPHIL # BLD AUTO: 0.1 K/UL — SIGNIFICANT CHANGE UP (ref 0–0.5)
EOSINOPHIL NFR BLD AUTO: 1 % — SIGNIFICANT CHANGE UP (ref 0–6)
GLUCOSE SERPL-MCNC: 112 MG/DL — HIGH (ref 70–99)
HCT VFR BLD CALC: 36.5 % — SIGNIFICANT CHANGE UP (ref 34.5–45)
HGB BLD-MCNC: 12.5 G/DL — SIGNIFICANT CHANGE UP (ref 11.5–15.5)
INR BLD: 0.91 RATIO — SIGNIFICANT CHANGE UP (ref 0.88–1.16)
LYMPHOCYTES # BLD AUTO: 1.4 K/UL — SIGNIFICANT CHANGE UP (ref 1–3.3)
LYMPHOCYTES # BLD AUTO: 23.6 % — SIGNIFICANT CHANGE UP (ref 13–44)
MCHC RBC-ENTMCNC: 31.8 PG — SIGNIFICANT CHANGE UP (ref 27–34)
MCHC RBC-ENTMCNC: 34.2 GM/DL — SIGNIFICANT CHANGE UP (ref 32–36)
MCV RBC AUTO: 92.9 FL — SIGNIFICANT CHANGE UP (ref 80–100)
MONOCYTES # BLD AUTO: 0.4 K/UL — SIGNIFICANT CHANGE UP (ref 0–0.9)
MONOCYTES NFR BLD AUTO: 6.2 % — SIGNIFICANT CHANGE UP (ref 2–14)
NEUTROPHILS # BLD AUTO: 4.1 K/UL — SIGNIFICANT CHANGE UP (ref 1.8–7.4)
NEUTROPHILS NFR BLD AUTO: 68.9 % — SIGNIFICANT CHANGE UP (ref 43–77)
PLATELET # BLD AUTO: 179 K/UL — SIGNIFICANT CHANGE UP (ref 150–400)
POTASSIUM SERPL-MCNC: 4.4 MMOL/L — SIGNIFICANT CHANGE UP (ref 3.5–5.3)
POTASSIUM SERPL-SCNC: 4.4 MMOL/L — SIGNIFICANT CHANGE UP (ref 3.5–5.3)
PROT SERPL-MCNC: 7 G/DL — SIGNIFICANT CHANGE UP (ref 6–8.3)
PROTHROM AB SERPL-ACNC: 9.9 SEC — SIGNIFICANT CHANGE UP (ref 9.8–12.7)
RBC # BLD: 3.92 M/UL — SIGNIFICANT CHANGE UP (ref 3.8–5.2)
RBC # FLD: 11.7 % — SIGNIFICANT CHANGE UP (ref 10.3–14.5)
SODIUM SERPL-SCNC: 142 MMOL/L — SIGNIFICANT CHANGE UP (ref 135–145)
TROPONIN T SERPL-MCNC: <0.01 NG/ML — SIGNIFICANT CHANGE UP (ref 0–0.06)
TROPONIN T SERPL-MCNC: <0.01 NG/ML — SIGNIFICANT CHANGE UP (ref 0–0.06)
WBC # BLD: 6 K/UL — SIGNIFICANT CHANGE UP (ref 3.8–10.5)
WBC # FLD AUTO: 6 K/UL — SIGNIFICANT CHANGE UP (ref 3.8–10.5)

## 2017-09-26 PROCEDURE — 99220: CPT | Mod: 25

## 2017-09-26 PROCEDURE — 99284 EMERGENCY DEPT VISIT MOD MDM: CPT

## 2017-09-26 PROCEDURE — 93010 ELECTROCARDIOGRAM REPORT: CPT

## 2017-09-26 PROCEDURE — 71020: CPT | Mod: 26

## 2017-09-26 RX ORDER — ESCITALOPRAM OXALATE 10 MG/1
5 TABLET, FILM COATED ORAL DAILY
Qty: 0 | Refills: 0 | Status: DISCONTINUED | OUTPATIENT
Start: 2017-09-26 | End: 2017-09-30

## 2017-09-26 RX ORDER — METOPROLOL TARTRATE 50 MG
12.5 TABLET ORAL
Qty: 0 | Refills: 0 | Status: DISCONTINUED | OUTPATIENT
Start: 2017-09-26 | End: 2017-09-30

## 2017-09-26 RX ORDER — METFORMIN HYDROCHLORIDE 850 MG/1
1000 TABLET ORAL
Qty: 0 | Refills: 0 | Status: DISCONTINUED | OUTPATIENT
Start: 2017-09-26 | End: 2017-09-30

## 2017-09-26 RX ORDER — ATORVASTATIN CALCIUM 80 MG/1
20 TABLET, FILM COATED ORAL AT BEDTIME
Qty: 0 | Refills: 0 | Status: DISCONTINUED | OUTPATIENT
Start: 2017-09-26 | End: 2017-09-30

## 2017-09-26 RX ORDER — SODIUM CHLORIDE 9 MG/ML
3 INJECTION INTRAMUSCULAR; INTRAVENOUS; SUBCUTANEOUS ONCE
Qty: 0 | Refills: 0 | Status: COMPLETED | OUTPATIENT
Start: 2017-09-26 | End: 2017-09-26

## 2017-09-26 RX ADMIN — Medication 12.5 MILLIGRAM(S): at 20:12

## 2017-09-26 RX ADMIN — SODIUM CHLORIDE 3 MILLILITER(S): 9 INJECTION INTRAMUSCULAR; INTRAVENOUS; SUBCUTANEOUS at 10:05

## 2017-09-26 RX ADMIN — METFORMIN HYDROCHLORIDE 1000 MILLIGRAM(S): 850 TABLET ORAL at 20:12

## 2017-09-26 RX ADMIN — ATORVASTATIN CALCIUM 20 MILLIGRAM(S): 80 TABLET, FILM COATED ORAL at 20:12

## 2017-09-26 NOTE — ED PROVIDER NOTE - NS ED ROS FT
CONSTITUTIONAL: No fevers, no chills  Eyes: no visual changes  Ears: no ear drainage, no ear pain  Nose: no nasal congestion  Mouth/Throat: no sore throat  Cardiovascular: +Chest pain  Respiratory: No SOB  Gastrointestinal: No n/v/d, no abd pain  Genitourinary: no dysuria, no hematuria  SKIN: no rashes.  NEURO: no headache  PSYCHIATRIC: no known mental health issues.

## 2017-09-26 NOTE — ED CDU PROVIDER NOTE - PLAN OF CARE
Follow up with your Primary Care Physician within the next 2-3 days  Follow up with Cardiology Clinic within the next 3 days  Bring a copy of your test results with you to your appointment  Continue your current medication regimen  Return to the Emergency Room if you experience new or worsening symptoms Follow up with your Primary Care Physician within the next 2-3 days  Follow up with Cardiology Clinic within the next 2-3 days. ([phone])  Bring a copy of your test results with you to your appointment.  Continue your current medication regimen including Metoprolol 12.5mg twice daily.   Return to the Emergency Room if you experience new or worsening symptoms including chest pain, shortness of breath, or any other concerns.

## 2017-09-26 NOTE — ED PROVIDER NOTE - OBJECTIVE STATEMENT
57 YOF pmh CAD, HTN, HLD, DM, Breast Ca 2006 presents to ed with burning sensation in chest and arms with tingling. Pt denies any jaw pain or back pain. Pt woke up with the pain today. Pt had stress Jan 2016 which did not show any abnormalities. Pt states she was seen in ED 2 weeks ago and   Pt had change from simvastatin to atorvastatin. Pt took aspirin today. Pain is similar to 2 weeks ago. Pt denies any sob, abd pain, fevers, chills.     Cardiology: zenaida [phone]

## 2017-09-26 NOTE — ED PROVIDER NOTE - ATTENDING CONTRIBUTION TO CARE
Attending MD Durand:  I personally have seen and examined this patient.  Resident note reviewed and agree on plan of care and except where noted.  See MDM for details.

## 2017-09-26 NOTE — ED ADULT NURSE NOTE - OBJECTIVE STATEMENT
57 y.o. Female presents to the ED c/o tingling from chest to arms x2 weeks. Pt reports coming to ED two weeks ago when symptoms started and was d/c to follow up with cardiologist. As per pt, cardiologist states pt was "fine." Pt reports taking aspirin of unknown amount today. NSR on cardiac monitor. Denies CP, SOB, N/V/D, urinary/bowel complications, fever/chills. Pink band on R arm d/t mastectomy from breast CA. A&Ox3. Comfortable appearing. Pt is in no current distress. Comfort and safety provided. Dr. Durand and Dr. Romero at bedside for evaluation. Will continue to monitor.

## 2017-09-26 NOTE — ED CDU PROVIDER NOTE - ATTENDING CONTRIBUTION TO CARE
Attending MD Durand:   I personally have seen and examined this patient.  Physician assistant note reviewed and agree on plan of care and except where noted.  See MDM for details.

## 2017-09-26 NOTE — CONSULT NOTE ADULT - ASSESSMENT
57 F with atypical chest pain. History of LA myocardial bridge. 57 F with atypical chest pain. History of LA myocardial bridge.   ·	Pain is atypical in its nature and seems noncardiac. That said patient does have history of myocardial bridge so it is reasonable to do exercise treadmill stress to rule ischemia as a result of bridge.   ·	Continue outpatient medications which include Metoprolol.

## 2017-09-26 NOTE — ED CDU PROVIDER NOTE - PROGRESS NOTE DETAILS
pt noticed on monitor to be going in and out of bigeminy, asymptomatic, VSS, second trop negative, dr. alberto aware, states to continue with plan, d/w Dr. Grant, will add BMP, Mg, phos to labs and continue to monitor, pt no longer in bigeminy PA Veronika Metz Patient resting in bed comfortably. No distress, no complaints. Vital Signs Stable. Frequent PVCs on monitor -Faisal Norman PA-C Patient resting in bed comfortably. No distress, no complaints. Vital Signs Stable. consistently frequent PVCs on tele monitor -Faisal Norman PA-C Patient resting in bed comfortably. No distress, no complaints. Vital Signs Stable. No new events on tele, patient still has frequent PVCs.-Faisal Norman PA-C Patient resting in bed comfortably. NAD. Reports episode of SOB when walking to bathroom overnight. Still with intermittent chest "tingling" sensation. Denies CP, palpitations. VSS. Pt with episodes of bigeminy on tele. Awaiting stress test today. Per cards, ok to give patient home dose of metoprolol 12.5mg this AM. -Carmen Campoverde PA-C Pt with non-ischemic stress test results. Discussed results with Dr. Owusu. Pt is to continue metoprolol 12.5mg BID and f/u with cardiology clinic. Stable for d/c. D/w Dr. Schultz. - NATALYA CarverC I have personally performed a face to face diagnostic evaluation on this patient.  I have reviewed the ACP note and agree with the history, exam, and plan of care, except as noted.  History and Exam by me shows  57F hx cad htn hld dm breast CA presented to the ED with chest tingling -patient seen had troponins x 2 negative and exercise stress test - seen by Cardiology att Dr. Owusu aware of results - thinks patient can follow up in the office -pt without any pain at this time. exam non-focal normal rate clear lungs normal pulses no edema or rashes. all questions answered. will d/c with follow up. Darrel Schultz M.D., Attending Physician

## 2017-09-26 NOTE — ED PROVIDER NOTE - MEDICAL DECISION MAKING DETAILS
Atypical chest pain hx of CAD, DM, HTN will do cardiac workup discuss with cardiology reassess. Atypical chest pain hx of CAD, DM, HTN will do cardiac workup discuss with cardiology reassess.    Attending MD Durand: 58 yo female with PMH for CAD, HTN, HLD, DM, breast CA right mastectomy presents with chest discomfort since this morning.  Denies nausea, vomiting, shortness of breath, back pain, abd pain or other symptoms. Attending MD Durand: A & O x 3, NAD, HEENT WNL and no facial asymmetry; lungs CTAB, heart with reg rhythm without murmur; abdomen soft NTND; extremities with no edema; skin with no rashes, neuro exam non focal with no motor or sensory deficits. Plan: Cardiac Monitor, EKG, Labs/cardiac enzymes, O2 2L NC, ASA, CXR and place in CDU for serial cardiac enzymes and exercise stress test.

## 2017-09-26 NOTE — ED CDU PROVIDER NOTE - DETAILS
57 y.o female c.o L chest/L arm pins and needles r.o ACS  tele,enzymes,stress,frequent re-evaluations  I D/W ED attending  Plan: VSS, symptomatice improvement, negative stress

## 2017-09-26 NOTE — CONSULT NOTE ADULT - SUBJECTIVE AND OBJECTIVE BOX
Chief Complaint: Chest pain.     HPI: 57 F with a history of LAD myocardial bridge presents with chest pain. Described as pin and needles. Radiates down the left arm. Lasted for one day. No clear provacative factors.     PMH:   CAD (coronary artery disease)  High Cholesterol  HTN - Hypertension  Breast Cancer  Diabetes Mellitus Type II    PSH:   S/P Sclerotherapy of Varicose Veins  S/P Breast Reconstruction  History of Appendectomy  History of Right Mastectomy    Family History:  FAMILY HISTORY:  Family history of colon cancer (Mother)    Allergies:  No Known Allergies    Social History:  Smoking: No active smoking.   Alcohol:  Drugs:    Medications:  metFORMIN 1000 milliGRAM(s) Oral two times a day with meals  atorvastatin 20 milliGRAM(s) Oral at bedtime  escitalopram 5 milliGRAM(s) Oral daily  metoprolol 12.5 milliGRAM(s) Oral two times a day      Cardiovascular Diagnostic Testing:  ECG: NSR. NO acute ST-T changes.     Echo:     Stress Testing: < from: Cardiac Treadmill Stress Test (Non Imaging) (01.21.16 @ 15:08) >  STRESS TEST IMPRESSIONS:  Exercise capacity: 11 METS, Excellent for age and gender.  Chest Pain: No chest pain withexercise.  Symptom: No Symptoms.  HR Response: Appropriate.  BP Response: Appropriate.  Heart Rhythm: Sinus Rhythm - 76 BPM.  Baseline ECG: Nonspecific ST-T wave abnormality.  ECG Changes: No significant ischemic ST segment changes  beyond baseline abnormalities.  Arrhythmia: None.    < end of copied text >      Cath:     Imaging:     Labs:                        12.5   6.0   )-----------( 179      ( 26 Sep 2017 10:23 )             36.5     09-26    142  |  105  |  20  ----------------------------<  112<H>  4.4   |  24  |  0.72    Ca    9.5      26 Sep 2017 10:36    TPro  7.0  /  Alb  4.0  /  TBili  0.2  /  DBili  x   /  AST  19  /  ALT  19  /  AlkPhos  76  09-26    PT/INR - ( 26 Sep 2017 10:22 )   PT: 9.9 sec;   INR: 0.91 ratio         PTT - ( 26 Sep 2017 10:22 )  PTT:28.9 sec  CARDIAC MARKERS ( 26 Sep 2017 17:10 )  x     / <0.01 ng/mL / x     / x     / x      CARDIAC MARKERS ( 26 Sep 2017 10:36 )  x     / <0.01 ng/mL / 72 U/L / x     / 1.2 ng/mL        Physical Exam:  T(C): 36.7 (09-26-17 @ 16:57), Max: 36.7 (09-26-17 @ 13:29)  HR: 68 (09-26-17 @ 16:57) (63 - 86)  BP: 119/79 (09-26-17 @ 16:57) (107/63 - 127/79)  RR: 16 (09-26-17 @ 16:57) (16 - 18)  SpO2: 99% (09-26-17 @ 16:57) (97% - 99%)  Wt(kg): --    Daily     Daily Chief Complaint: Chest pain.     HPI: 57 F with a history of LAD myocardial bridge presents with chest pain. Described as pin and needles. Radiates down the left arm. Lasted for one day. No clear provacative factors. Resolves on its own. Not exertional.     PMH:   CAD (coronary artery disease)  High Cholesterol  HTN - Hypertension  Breast Cancer  Diabetes Mellitus Type II    PSH:   S/P Sclerotherapy of Varicose Veins  S/P Breast Reconstruction  History of Appendectomy  History of Right Mastectomy    Family History:  FAMILY HISTORY:  Family history of colon cancer (Mother)    Allergies:  No Known Allergies    Social History:  Smoking: No active smoking.   Alcohol:  Drugs:    Medications:  metFORMIN 1000 milliGRAM(s) Oral two times a day with meals  atorvastatin 20 milliGRAM(s) Oral at bedtime  escitalopram 5 milliGRAM(s) Oral daily  metoprolol 12.5 milliGRAM(s) Oral two times a day      Cardiovascular Diagnostic Testing:  ECG: NSR. NO acute ST-T changes.     Echo:     Stress Testing: < from: Cardiac Treadmill Stress Test (Non Imaging) (01.21.16 @ 15:08) >  STRESS TEST IMPRESSIONS:  Exercise capacity: 11 METS, Excellent for age and gender.  Chest Pain: No chest pain withexercise.  Symptom: No Symptoms.  HR Response: Appropriate.  BP Response: Appropriate.  Heart Rhythm: Sinus Rhythm - 76 BPM.  Baseline ECG: Nonspecific ST-T wave abnormality.  ECG Changes: No significant ischemic ST segment changes  beyond baseline abnormalities.  Arrhythmia: None.    < end of copied text >      Cath:     Imaging:     Labs:                        12.5   6.0   )-----------( 179      ( 26 Sep 2017 10:23 )             36.5     09-26    142  |  105  |  20  ----------------------------<  112<H>  4.4   |  24  |  0.72    Ca    9.5      26 Sep 2017 10:36    TPro  7.0  /  Alb  4.0  /  TBili  0.2  /  DBili  x   /  AST  19  /  ALT  19  /  AlkPhos  76  09-26    PT/INR - ( 26 Sep 2017 10:22 )   PT: 9.9 sec;   INR: 0.91 ratio         PTT - ( 26 Sep 2017 10:22 )  PTT:28.9 sec  CARDIAC MARKERS ( 26 Sep 2017 17:10 )  x     / <0.01 ng/mL / x     / x     / x      CARDIAC MARKERS ( 26 Sep 2017 10:36 )  x     / <0.01 ng/mL / 72 U/L / x     / 1.2 ng/mL        Physical Exam:  T(C): 36.7 (09-26-17 @ 16:57), Max: 36.7 (09-26-17 @ 13:29)  HR: 68 (09-26-17 @ 16:57) (63 - 86)  BP: 119/79 (09-26-17 @ 16:57) (107/63 - 127/79)  RR: 16 (09-26-17 @ 16:57) (16 - 18)  SpO2: 99% (09-26-17 @ 16:57) (97% - 99%)  Wt(kg): --    Daily     Daily

## 2017-09-26 NOTE — ED CDU PROVIDER NOTE - OBJECTIVE STATEMENT
Pt is a 57 y.o female PMHx HLD, DM2, breast CA 2006 sp chemo/R mastectomy c.o pins and needles to left side of chest radiating down left arm x 1 day.  Pt recently seen in ED about 2 weeks ago for similar symptoms, had two negative enzymes, seen by our house cards and discharged, pt was feeling, better, followed up with cardiology 2 days ago was told everything was fine, but then yesterday started with symptoms again and came to ED.  Pts last stress in Jan 2016 which was normal.  Pt denies any acute chest pain, sob, diaphoresis, nvd, abd pain, back pain, jaw pain, leg pain/swelling, recent travel, f/c.    Cardiology: zenaida [phone] Neel

## 2017-09-26 NOTE — ED ADULT NURSE REASSESSMENT NOTE - NS ED NURSE REASSESS COMMENT FT1
report taken from Mariann BRADFORD. pt stable transport to CDU Bed 8
Pt received from SANCHEZ Fox. Pt oriented to CDU & plan of care was discussed. Pt denies any chest pain, SOB, dizziness or palpitations. Safety & comfort measures maintained. Call bell in reach. Will continue to monitor.

## 2017-09-26 NOTE — ED CDU PROVIDER NOTE - MEDICAL DECISION MAKING DETAILS
Attending MD Durand: 56 yo female with PMH for CAD, HTN, HLD, DM, breast CA right mastectomy presents with chest discomfort since this morning.  Denies nausea, vomiting, shortness of breath, back pain, abd pain or other symptoms. Attending MD Durand: A & O x 3, NAD, HEENT WNL and no facial asymmetry; lungs CTAB, heart with reg rhythm without murmur; abdomen soft NTND; extremities with no edema; skin with no rashes, neuro exam non focal with no motor or sensory deficits. Plan: Cardiac Monitor, EKG, Labs/cardiac enzymes, O2 2L NC, ASA, CXR and place in CDU for serial cardiac enzymes and exercise stress test.

## 2017-09-27 VITALS
RESPIRATION RATE: 16 BRPM | OXYGEN SATURATION: 95 % | SYSTOLIC BLOOD PRESSURE: 92 MMHG | DIASTOLIC BLOOD PRESSURE: 62 MMHG | HEART RATE: 71 BPM | TEMPERATURE: 98 F

## 2017-09-27 LAB
CHOLEST SERPL-MCNC: 137 MG/DL — SIGNIFICANT CHANGE UP (ref 10–199)
HBA1C BLD-MCNC: 6.3 % — HIGH (ref 4–5.6)
HDLC SERPL-MCNC: 73 MG/DL — SIGNIFICANT CHANGE UP (ref 40–125)
LIPID PNL WITH DIRECT LDL SERPL: 52 MG/DL — SIGNIFICANT CHANGE UP
TOTAL CHOLESTEROL/HDL RATIO MEASUREMENT: 1.9 RATIO — LOW (ref 3.3–7.1)
TRIGL SERPL-MCNC: 59 MG/DL — SIGNIFICANT CHANGE UP (ref 10–149)

## 2017-09-27 PROCEDURE — 82550 ASSAY OF CK (CPK): CPT

## 2017-09-27 PROCEDURE — 83036 HEMOGLOBIN GLYCOSYLATED A1C: CPT

## 2017-09-27 PROCEDURE — 93016 CV STRESS TEST SUPVJ ONLY: CPT

## 2017-09-27 PROCEDURE — 99217: CPT

## 2017-09-27 PROCEDURE — 80053 COMPREHEN METABOLIC PANEL: CPT

## 2017-09-27 PROCEDURE — 80048 BASIC METABOLIC PNL TOTAL CA: CPT

## 2017-09-27 PROCEDURE — 84484 ASSAY OF TROPONIN QUANT: CPT

## 2017-09-27 PROCEDURE — 80061 LIPID PANEL: CPT

## 2017-09-27 PROCEDURE — 85730 THROMBOPLASTIN TIME PARTIAL: CPT

## 2017-09-27 PROCEDURE — 93005 ELECTROCARDIOGRAM TRACING: CPT | Mod: XU

## 2017-09-27 PROCEDURE — 85027 COMPLETE CBC AUTOMATED: CPT

## 2017-09-27 PROCEDURE — 93018 CV STRESS TEST I&R ONLY: CPT

## 2017-09-27 PROCEDURE — 83735 ASSAY OF MAGNESIUM: CPT

## 2017-09-27 PROCEDURE — 85379 FIBRIN DEGRADATION QUANT: CPT

## 2017-09-27 PROCEDURE — 82553 CREATINE MB FRACTION: CPT

## 2017-09-27 PROCEDURE — 85610 PROTHROMBIN TIME: CPT

## 2017-09-27 PROCEDURE — 71046 X-RAY EXAM CHEST 2 VIEWS: CPT

## 2017-09-27 PROCEDURE — 84100 ASSAY OF PHOSPHORUS: CPT

## 2017-09-27 PROCEDURE — 93017 CV STRESS TEST TRACING ONLY: CPT

## 2017-09-27 PROCEDURE — G0378: CPT

## 2017-09-27 PROCEDURE — 99284 EMERGENCY DEPT VISIT MOD MDM: CPT | Mod: 25

## 2017-09-27 RX ADMIN — Medication 12.5 MILLIGRAM(S): at 08:05

## 2017-09-27 RX ADMIN — METFORMIN HYDROCHLORIDE 1000 MILLIGRAM(S): 850 TABLET ORAL at 08:05

## 2017-09-27 RX ADMIN — ESCITALOPRAM OXALATE 5 MILLIGRAM(S): 10 TABLET, FILM COATED ORAL at 08:05

## 2017-09-28 ENCOUNTER — OUTPATIENT (OUTPATIENT)
Dept: OUTPATIENT SERVICES | Facility: HOSPITAL | Age: 57
LOS: 1 days | End: 2017-09-28
Payer: SELF-PAY

## 2017-09-28 ENCOUNTER — NON-APPOINTMENT (OUTPATIENT)
Age: 57
End: 2017-09-28

## 2017-09-28 ENCOUNTER — APPOINTMENT (OUTPATIENT)
Dept: CARDIOLOGY | Facility: HOSPITAL | Age: 57
End: 2017-09-28

## 2017-09-28 VITALS — HEART RATE: 77 BPM | SYSTOLIC BLOOD PRESSURE: 119 MMHG | DIASTOLIC BLOOD PRESSURE: 62 MMHG | OXYGEN SATURATION: 97 %

## 2017-09-28 DIAGNOSIS — R00.2 PALPITATIONS: ICD-10-CM

## 2017-09-28 DIAGNOSIS — I25.10 ATHEROSCLEROTIC HEART DISEASE OF NATIVE CORONARY ARTERY WITHOUT ANGINA PECTORIS: ICD-10-CM

## 2017-09-28 DIAGNOSIS — R07.89 OTHER CHEST PAIN: ICD-10-CM

## 2017-09-28 PROCEDURE — G0463: CPT

## 2017-09-28 PROCEDURE — 93005 ELECTROCARDIOGRAM TRACING: CPT

## 2017-10-03 ENCOUNTER — APPOINTMENT (OUTPATIENT)
Dept: FAMILY MEDICINE | Facility: HOSPITAL | Age: 57
End: 2017-10-03

## 2017-10-03 ENCOUNTER — OUTPATIENT (OUTPATIENT)
Dept: OUTPATIENT SERVICES | Facility: HOSPITAL | Age: 57
LOS: 1 days | End: 2017-10-03
Payer: SELF-PAY

## 2017-10-03 VITALS
OXYGEN SATURATION: 96 % | SYSTOLIC BLOOD PRESSURE: 110 MMHG | TEMPERATURE: 97.1 F | WEIGHT: 174 LBS | DIASTOLIC BLOOD PRESSURE: 70 MMHG | RESPIRATION RATE: 16 BRPM | HEART RATE: 71 BPM | BODY MASS INDEX: 28.96 KG/M2

## 2017-10-03 DIAGNOSIS — E11.9 TYPE 2 DIABETES MELLITUS WITHOUT COMPLICATIONS: ICD-10-CM

## 2017-10-03 PROCEDURE — G0463: CPT

## 2017-11-30 ENCOUNTER — APPOINTMENT (OUTPATIENT)
Dept: FAMILY MEDICINE | Facility: HOSPITAL | Age: 57
End: 2017-11-30

## 2017-11-30 ENCOUNTER — OUTPATIENT (OUTPATIENT)
Dept: OUTPATIENT SERVICES | Facility: HOSPITAL | Age: 57
LOS: 1 days | End: 2017-11-30
Payer: SELF-PAY

## 2017-11-30 VITALS
TEMPERATURE: 98.4 F | OXYGEN SATURATION: 97 % | SYSTOLIC BLOOD PRESSURE: 121 MMHG | HEART RATE: 71 BPM | BODY MASS INDEX: 29.12 KG/M2 | WEIGHT: 175 LBS | RESPIRATION RATE: 16 BRPM | DIASTOLIC BLOOD PRESSURE: 78 MMHG

## 2017-11-30 DIAGNOSIS — Z00.00 ENCOUNTER FOR GENERAL ADULT MEDICAL EXAMINATION WITHOUT ABNORMAL FINDINGS: ICD-10-CM

## 2017-11-30 DIAGNOSIS — E11.9 TYPE 2 DIABETES MELLITUS WITHOUT COMPLICATIONS: ICD-10-CM

## 2017-11-30 PROCEDURE — 82043 UR ALBUMIN QUANTITATIVE: CPT

## 2017-11-30 PROCEDURE — 83036 HEMOGLOBIN GLYCOSYLATED A1C: CPT

## 2017-11-30 PROCEDURE — 80048 BASIC METABOLIC PNL TOTAL CA: CPT

## 2017-11-30 PROCEDURE — 85610 PROTHROMBIN TIME: CPT

## 2017-11-30 PROCEDURE — 85025 COMPLETE CBC W/AUTO DIFF WBC: CPT

## 2017-11-30 PROCEDURE — 85670 THROMBIN TIME PLASMA: CPT

## 2017-11-30 PROCEDURE — 36415 COLL VENOUS BLD VENIPUNCTURE: CPT

## 2017-11-30 PROCEDURE — 80061 LIPID PANEL: CPT

## 2017-11-30 PROCEDURE — G0463: CPT

## 2017-11-30 RX ORDER — HYDROQUINONE 40 MG/G
4 CREAM TOPICAL
Qty: 1 | Refills: 3 | Status: DISCONTINUED | COMMUNITY
Start: 2017-01-26 | End: 2017-11-30

## 2017-12-01 DIAGNOSIS — L98.9 DISORDER OF THE SKIN AND SUBCUTANEOUS TISSUE, UNSPECIFIED: ICD-10-CM

## 2017-12-01 DIAGNOSIS — E78.5 HYPERLIPIDEMIA, UNSPECIFIED: ICD-10-CM

## 2017-12-04 ENCOUNTER — MEDICATION RENEWAL (OUTPATIENT)
Age: 57
End: 2017-12-04

## 2017-12-08 ENCOUNTER — OUTPATIENT (OUTPATIENT)
Dept: OUTPATIENT SERVICES | Facility: HOSPITAL | Age: 57
LOS: 1 days | End: 2017-12-08
Payer: SELF-PAY

## 2017-12-08 ENCOUNTER — APPOINTMENT (OUTPATIENT)
Dept: FAMILY MEDICINE | Facility: HOSPITAL | Age: 57
End: 2017-12-08

## 2017-12-08 ENCOUNTER — RESULT CHARGE (OUTPATIENT)
Age: 57
End: 2017-12-08

## 2017-12-08 VITALS
TEMPERATURE: 98 F | WEIGHT: 178 LBS | DIASTOLIC BLOOD PRESSURE: 80 MMHG | BODY MASS INDEX: 29.66 KG/M2 | HEIGHT: 65 IN | RESPIRATION RATE: 16 BRPM | OXYGEN SATURATION: 97 % | HEART RATE: 82 BPM | SYSTOLIC BLOOD PRESSURE: 120 MMHG

## 2017-12-08 DIAGNOSIS — Z00.00 ENCOUNTER FOR GENERAL ADULT MEDICAL EXAMINATION WITHOUT ABNORMAL FINDINGS: ICD-10-CM

## 2017-12-08 LAB
BILIRUB UR QL STRIP: NORMAL
CLARITY UR: CLEAR
COLLECTION METHOD: NORMAL
GLUCOSE UR-MCNC: NORMAL
HCG UR QL: 0.2 EU/DL
HGB UR QL STRIP.AUTO: NORMAL
KETONES UR-MCNC: NORMAL
LEUKOCYTE ESTERASE UR QL STRIP: NORMAL
NITRITE UR QL STRIP: NORMAL
PH UR STRIP: 5.5
PROT UR STRIP-MCNC: NORMAL
SP GR UR STRIP: 1.01

## 2017-12-08 PROCEDURE — 87086 URINE CULTURE/COLONY COUNT: CPT

## 2017-12-08 PROCEDURE — G0463: CPT

## 2017-12-12 DIAGNOSIS — N30.00 ACUTE CYSTITIS WITHOUT HEMATURIA: ICD-10-CM

## 2017-12-12 DIAGNOSIS — K21.9 GASTRO-ESOPHAGEAL REFLUX DISEASE WITHOUT ESOPHAGITIS: ICD-10-CM

## 2017-12-13 RX ORDER — METFORMIN HYDROCHLORIDE 500 MG/1
500 TABLET, COATED ORAL
Qty: 90 | Refills: 3 | Status: DISCONTINUED | COMMUNITY
Start: 2017-12-13 | End: 2017-12-13

## 2018-01-17 ENCOUNTER — RX RENEWAL (OUTPATIENT)
Age: 58
End: 2018-01-17

## 2018-01-20 ENCOUNTER — APPOINTMENT (OUTPATIENT)
Dept: FAMILY MEDICINE | Facility: HOSPITAL | Age: 58
End: 2018-01-20

## 2018-01-20 ENCOUNTER — RESULT CHARGE (OUTPATIENT)
Age: 58
End: 2018-01-20

## 2018-01-20 ENCOUNTER — OUTPATIENT (OUTPATIENT)
Dept: OUTPATIENT SERVICES | Facility: HOSPITAL | Age: 58
LOS: 1 days | End: 2018-01-20
Payer: SELF-PAY

## 2018-01-20 VITALS
SYSTOLIC BLOOD PRESSURE: 123 MMHG | OXYGEN SATURATION: 99 % | BODY MASS INDEX: 28.29 KG/M2 | DIASTOLIC BLOOD PRESSURE: 80 MMHG | WEIGHT: 170 LBS | HEART RATE: 82 BPM | RESPIRATION RATE: 16 BRPM

## 2018-01-20 DIAGNOSIS — Z00.00 ENCOUNTER FOR GENERAL ADULT MEDICAL EXAMINATION WITHOUT ABNORMAL FINDINGS: ICD-10-CM

## 2018-01-20 LAB
BILIRUB UR QL STRIP: NEGATIVE
CLARITY UR: CLEAR
COLLECTION METHOD: NORMAL
GLUCOSE UR-MCNC: NEGATIVE
HCG UR QL: 0.2 EU/DL
HGB UR QL STRIP.AUTO: NORMAL
KETONES UR-MCNC: NEGATIVE
LEUKOCYTE ESTERASE UR QL STRIP: NEGATIVE
NITRITE UR QL STRIP: NEGATIVE
PH UR STRIP: 5
PROT UR STRIP-MCNC: NEGATIVE
SP GR UR STRIP: 1.02

## 2018-01-20 PROCEDURE — G0010: CPT

## 2018-01-20 PROCEDURE — G0463: CPT

## 2018-01-23 ENCOUNTER — APPOINTMENT (OUTPATIENT)
Dept: GASTROENTEROLOGY | Facility: HOSPITAL | Age: 58
End: 2018-01-23

## 2018-01-24 DIAGNOSIS — E11.9 TYPE 2 DIABETES MELLITUS WITHOUT COMPLICATIONS: ICD-10-CM

## 2018-01-24 DIAGNOSIS — R30.0 DYSURIA: ICD-10-CM

## 2018-01-24 DIAGNOSIS — Z23 ENCOUNTER FOR IMMUNIZATION: ICD-10-CM

## 2018-01-24 DIAGNOSIS — E11.29 TYPE 2 DIABETES MELLITUS WITH OTHER DIABETIC KIDNEY COMPLICATION: ICD-10-CM

## 2018-01-28 ENCOUNTER — FORM ENCOUNTER (OUTPATIENT)
Age: 58
End: 2018-01-28

## 2018-01-29 ENCOUNTER — OUTPATIENT (OUTPATIENT)
Dept: OUTPATIENT SERVICES | Facility: HOSPITAL | Age: 58
LOS: 1 days | End: 2018-01-29
Payer: SELF-PAY

## 2018-01-29 ENCOUNTER — APPOINTMENT (OUTPATIENT)
Dept: MAMMOGRAPHY | Facility: IMAGING CENTER | Age: 58
End: 2018-01-29
Payer: COMMERCIAL

## 2018-01-29 ENCOUNTER — APPOINTMENT (OUTPATIENT)
Dept: ULTRASOUND IMAGING | Facility: IMAGING CENTER | Age: 58
End: 2018-01-29
Payer: COMMERCIAL

## 2018-01-29 DIAGNOSIS — C50.919 MALIGNANT NEOPLASM OF UNSPECIFIED SITE OF UNSPECIFIED FEMALE BREAST: ICD-10-CM

## 2018-01-29 PROCEDURE — 77063 BREAST TOMOSYNTHESIS BI: CPT

## 2018-01-29 PROCEDURE — 77067 SCR MAMMO BI INCL CAD: CPT

## 2018-01-29 PROCEDURE — 77067 SCR MAMMO BI INCL CAD: CPT | Mod: 26,52,LT

## 2018-01-29 PROCEDURE — 77063 BREAST TOMOSYNTHESIS BI: CPT | Mod: 26,52

## 2018-03-01 ENCOUNTER — MED ADMIN CHARGE (OUTPATIENT)
Age: 58
End: 2018-03-01

## 2018-03-01 ENCOUNTER — OUTPATIENT (OUTPATIENT)
Dept: OUTPATIENT SERVICES | Facility: HOSPITAL | Age: 58
LOS: 1 days | End: 2018-03-01
Payer: SELF-PAY

## 2018-03-01 ENCOUNTER — APPOINTMENT (OUTPATIENT)
Dept: FAMILY MEDICINE | Facility: HOSPITAL | Age: 58
End: 2018-03-01

## 2018-03-01 VITALS
DIASTOLIC BLOOD PRESSURE: 82 MMHG | BODY MASS INDEX: 28.46 KG/M2 | SYSTOLIC BLOOD PRESSURE: 121 MMHG | OXYGEN SATURATION: 98 % | RESPIRATION RATE: 16 BRPM | WEIGHT: 171 LBS | HEART RATE: 97 BPM | TEMPERATURE: 69 F

## 2018-03-01 DIAGNOSIS — Z00.00 ENCOUNTER FOR GENERAL ADULT MEDICAL EXAMINATION WITHOUT ABNORMAL FINDINGS: ICD-10-CM

## 2018-03-01 LAB
HBA1C MFR BLD HPLC: 6.3
LDLC SERPL DIRECT ASSAY-MCNC: 68
MICROALBUMIN/CREAT 24H UR-RTO: 36

## 2018-03-01 PROCEDURE — G0463: CPT

## 2018-03-01 RX ORDER — FAMOTIDINE 20 MG/1
20 TABLET, FILM COATED ORAL
Qty: 30 | Refills: 0 | Status: DISCONTINUED | COMMUNITY
Start: 2017-12-08 | End: 2018-03-01

## 2018-03-02 DIAGNOSIS — M79.2 NEURALGIA AND NEURITIS, UNSPECIFIED: ICD-10-CM

## 2018-03-02 DIAGNOSIS — G21.9 SECONDARY PARKINSONISM, UNSPECIFIED: ICD-10-CM

## 2018-03-02 DIAGNOSIS — M25.559 PAIN IN UNSPECIFIED HIP: ICD-10-CM

## 2018-03-02 DIAGNOSIS — E11.9 TYPE 2 DIABETES MELLITUS WITHOUT COMPLICATIONS: ICD-10-CM

## 2018-03-02 DIAGNOSIS — E66.9 OBESITY, UNSPECIFIED: ICD-10-CM

## 2018-03-02 DIAGNOSIS — E78.5 HYPERLIPIDEMIA, UNSPECIFIED: ICD-10-CM

## 2018-03-02 DIAGNOSIS — M89.8X9 OTHER SPECIFIED DISORDERS OF BONE, UNSPECIFIED SITE: ICD-10-CM

## 2018-03-12 ENCOUNTER — APPOINTMENT (OUTPATIENT)
Dept: OBGYN | Facility: CLINIC | Age: 58
End: 2018-03-12
Payer: COMMERCIAL

## 2018-03-12 ENCOUNTER — OUTPATIENT (OUTPATIENT)
Dept: OUTPATIENT SERVICES | Facility: HOSPITAL | Age: 58
LOS: 1 days | End: 2018-03-12
Payer: SELF-PAY

## 2018-03-12 VITALS — DIASTOLIC BLOOD PRESSURE: 80 MMHG | BODY MASS INDEX: 28.62 KG/M2 | SYSTOLIC BLOOD PRESSURE: 120 MMHG | WEIGHT: 172 LBS

## 2018-03-12 DIAGNOSIS — N76.0 ACUTE VAGINITIS: ICD-10-CM

## 2018-03-12 PROCEDURE — 99213 OFFICE O/P EST LOW 20 MIN: CPT | Mod: NC

## 2018-03-12 PROCEDURE — 81002 URINALYSIS NONAUTO W/O SCOPE: CPT

## 2018-03-12 PROCEDURE — G0463: CPT

## 2018-03-12 PROCEDURE — 81002 URINALYSIS NONAUTO W/O SCOPE: CPT | Mod: NC

## 2018-03-16 DIAGNOSIS — R10.2 PELVIC AND PERINEAL PAIN: ICD-10-CM

## 2018-03-20 ENCOUNTER — OUTPATIENT (OUTPATIENT)
Dept: OUTPATIENT SERVICES | Facility: HOSPITAL | Age: 58
LOS: 1 days | End: 2018-03-20
Payer: SELF-PAY

## 2018-03-20 ENCOUNTER — APPOINTMENT (OUTPATIENT)
Dept: GASTROENTEROLOGY | Facility: HOSPITAL | Age: 58
End: 2018-03-20

## 2018-03-20 VITALS
RESPIRATION RATE: 16 BRPM | HEART RATE: 76 BPM | HEIGHT: 65 IN | DIASTOLIC BLOOD PRESSURE: 75 MMHG | SYSTOLIC BLOOD PRESSURE: 114 MMHG | BODY MASS INDEX: 28.66 KG/M2 | WEIGHT: 172 LBS

## 2018-03-20 DIAGNOSIS — R10.9 UNSPECIFIED ABDOMINAL PAIN: ICD-10-CM

## 2018-03-20 PROCEDURE — G0463: CPT

## 2018-03-21 ENCOUNTER — APPOINTMENT (OUTPATIENT)
Dept: ULTRASOUND IMAGING | Facility: IMAGING CENTER | Age: 58
End: 2018-03-21

## 2018-03-22 DIAGNOSIS — K21.9 GASTRO-ESOPHAGEAL REFLUX DISEASE WITHOUT ESOPHAGITIS: ICD-10-CM

## 2018-03-22 DIAGNOSIS — D12.6 BENIGN NEOPLASM OF COLON, UNSPECIFIED: ICD-10-CM

## 2018-04-03 ENCOUNTER — OUTPATIENT (OUTPATIENT)
Dept: OUTPATIENT SERVICES | Facility: HOSPITAL | Age: 58
LOS: 1 days | End: 2018-04-03
Payer: SELF-PAY

## 2018-04-03 ENCOUNTER — APPOINTMENT (OUTPATIENT)
Dept: GASTROENTEROLOGY | Facility: HOSPITAL | Age: 58
End: 2018-04-03

## 2018-04-03 VITALS
HEART RATE: 97 BPM | WEIGHT: 172 LBS | HEIGHT: 65 IN | BODY MASS INDEX: 28.66 KG/M2 | RESPIRATION RATE: 14 BRPM | DIASTOLIC BLOOD PRESSURE: 84 MMHG | SYSTOLIC BLOOD PRESSURE: 126 MMHG

## 2018-04-03 DIAGNOSIS — D12.6 BENIGN NEOPLASM OF COLON, UNSPECIFIED: ICD-10-CM

## 2018-04-03 DIAGNOSIS — E66.3 OVERWEIGHT: ICD-10-CM

## 2018-04-03 DIAGNOSIS — R10.9 UNSPECIFIED ABDOMINAL PAIN: ICD-10-CM

## 2018-04-03 DIAGNOSIS — K76.0 FATTY (CHANGE OF) LIVER, NOT ELSEWHERE CLASSIFIED: ICD-10-CM

## 2018-04-03 DIAGNOSIS — R10.13 EPIGASTRIC PAIN: ICD-10-CM

## 2018-04-03 PROCEDURE — G0463: CPT

## 2018-04-10 ENCOUNTER — OUTPATIENT (OUTPATIENT)
Dept: OUTPATIENT SERVICES | Facility: HOSPITAL | Age: 58
LOS: 1 days | End: 2018-04-10
Payer: SELF-PAY

## 2018-04-10 ENCOUNTER — APPOINTMENT (OUTPATIENT)
Dept: ULTRASOUND IMAGING | Facility: IMAGING CENTER | Age: 58
End: 2018-04-10
Payer: COMMERCIAL

## 2018-04-10 DIAGNOSIS — R10.2 PELVIC AND PERINEAL PAIN: ICD-10-CM

## 2018-04-10 PROCEDURE — 76856 US EXAM PELVIC COMPLETE: CPT | Mod: 26

## 2018-04-10 PROCEDURE — 76830 TRANSVAGINAL US NON-OB: CPT

## 2018-04-10 PROCEDURE — 76830 TRANSVAGINAL US NON-OB: CPT | Mod: 26

## 2018-04-10 PROCEDURE — 76856 US EXAM PELVIC COMPLETE: CPT

## 2018-04-13 ENCOUNTER — OUTPATIENT (OUTPATIENT)
Dept: OUTPATIENT SERVICES | Facility: HOSPITAL | Age: 58
LOS: 1 days | End: 2018-04-13

## 2018-04-13 ENCOUNTER — APPOINTMENT (OUTPATIENT)
Dept: OPHTHALMOLOGY | Facility: CLINIC | Age: 58
End: 2018-04-13

## 2018-05-03 ENCOUNTER — OTHER (OUTPATIENT)
Age: 58
End: 2018-05-03

## 2018-05-03 ENCOUNTER — OUTPATIENT (OUTPATIENT)
Dept: OUTPATIENT SERVICES | Facility: HOSPITAL | Age: 58
LOS: 1 days | End: 2018-05-03
Payer: SELF-PAY

## 2018-05-03 DIAGNOSIS — K21.9 GASTRO-ESOPHAGEAL REFLUX DISEASE WITHOUT ESOPHAGITIS: ICD-10-CM

## 2018-05-03 PROCEDURE — 91010 ESOPHAGUS MOTILITY STUDY: CPT | Mod: 26

## 2018-05-03 PROCEDURE — 91037 ESOPH IMPED FUNCTION TEST: CPT | Mod: 26

## 2018-05-03 PROCEDURE — 91010 ESOPHAGUS MOTILITY STUDY: CPT

## 2018-05-08 ENCOUNTER — APPOINTMENT (OUTPATIENT)
Dept: OBGYN | Facility: CLINIC | Age: 58
End: 2018-05-08

## 2018-05-08 VITALS — SYSTOLIC BLOOD PRESSURE: 108 MMHG | DIASTOLIC BLOOD PRESSURE: 70 MMHG | WEIGHT: 171 LBS | BODY MASS INDEX: 28.46 KG/M2

## 2018-05-09 ENCOUNTER — OUTPATIENT (OUTPATIENT)
Dept: OUTPATIENT SERVICES | Facility: HOSPITAL | Age: 58
LOS: 1 days | End: 2018-05-09
Payer: SELF-PAY

## 2018-05-09 DIAGNOSIS — D12.6 BENIGN NEOPLASM OF COLON, UNSPECIFIED: ICD-10-CM

## 2018-05-09 LAB — GLUCOSE BLDC GLUCOMTR-MCNC: 114 MG/DL — HIGH (ref 70–99)

## 2018-05-09 PROCEDURE — 45378 DIAGNOSTIC COLONOSCOPY: CPT

## 2018-05-09 PROCEDURE — 82962 GLUCOSE BLOOD TEST: CPT

## 2018-05-09 PROCEDURE — G0105: CPT

## 2018-05-11 ENCOUNTER — APPOINTMENT (OUTPATIENT)
Dept: OPHTHALMOLOGY | Facility: CLINIC | Age: 58
End: 2018-05-11

## 2018-05-30 ENCOUNTER — OUTPATIENT (OUTPATIENT)
Dept: OUTPATIENT SERVICES | Facility: HOSPITAL | Age: 58
LOS: 1 days | End: 2018-05-30
Payer: SELF-PAY

## 2018-05-30 DIAGNOSIS — K21.9 GASTRO-ESOPHAGEAL REFLUX DISEASE WITHOUT ESOPHAGITIS: ICD-10-CM

## 2018-05-30 PROCEDURE — 43235 EGD DIAGNOSTIC BRUSH WASH: CPT

## 2018-05-30 PROCEDURE — 82962 GLUCOSE BLOOD TEST: CPT

## 2018-05-30 PROCEDURE — C1889: CPT

## 2018-06-01 PROCEDURE — 91035 G-ESOPH REFLX TST W/ELECTROD: CPT | Mod: 26

## 2018-06-12 ENCOUNTER — OUTPATIENT (OUTPATIENT)
Dept: OUTPATIENT SERVICES | Facility: HOSPITAL | Age: 58
LOS: 1 days | End: 2018-06-12
Payer: SELF-PAY

## 2018-06-12 ENCOUNTER — APPOINTMENT (OUTPATIENT)
Dept: GASTROENTEROLOGY | Facility: HOSPITAL | Age: 58
End: 2018-06-12

## 2018-06-12 VITALS
BODY MASS INDEX: 28.49 KG/M2 | HEART RATE: 87 BPM | RESPIRATION RATE: 14 BRPM | SYSTOLIC BLOOD PRESSURE: 105 MMHG | HEIGHT: 65 IN | DIASTOLIC BLOOD PRESSURE: 70 MMHG | WEIGHT: 171 LBS

## 2018-06-12 DIAGNOSIS — D12.6 BENIGN NEOPLASM OF COLON, UNSPECIFIED: ICD-10-CM

## 2018-06-12 DIAGNOSIS — K21.9 GASTRO-ESOPHAGEAL REFLUX DISEASE WITHOUT ESOPHAGITIS: ICD-10-CM

## 2018-06-12 DIAGNOSIS — K31.9 DISEASE OF STOMACH AND DUODENUM, UNSPECIFIED: ICD-10-CM

## 2018-06-12 PROCEDURE — G0463: CPT

## 2018-06-13 ENCOUNTER — OUTPATIENT (OUTPATIENT)
Dept: OUTPATIENT SERVICES | Facility: HOSPITAL | Age: 58
LOS: 1 days | Discharge: HOME | End: 2018-06-13

## 2018-06-13 DIAGNOSIS — C50.919 MALIGNANT NEOPLASM OF UNSPECIFIED SITE OF UNSPECIFIED FEMALE BREAST: ICD-10-CM

## 2018-06-14 ENCOUNTER — RESULT REVIEW (OUTPATIENT)
Age: 58
End: 2018-06-14

## 2018-06-14 ENCOUNTER — APPOINTMENT (OUTPATIENT)
Dept: HEMATOLOGY ONCOLOGY | Facility: CLINIC | Age: 58
End: 2018-06-14

## 2018-06-14 ENCOUNTER — APPOINTMENT (OUTPATIENT)
Dept: OBGYN | Facility: CLINIC | Age: 58
End: 2018-06-14
Payer: COMMERCIAL

## 2018-06-14 ENCOUNTER — OUTPATIENT (OUTPATIENT)
Dept: OUTPATIENT SERVICES | Facility: HOSPITAL | Age: 58
LOS: 1 days | End: 2018-06-14
Payer: SELF-PAY

## 2018-06-14 VITALS — WEIGHT: 175 LBS | BODY MASS INDEX: 29.12 KG/M2 | SYSTOLIC BLOOD PRESSURE: 112 MMHG | DIASTOLIC BLOOD PRESSURE: 70 MMHG

## 2018-06-14 VITALS
DIASTOLIC BLOOD PRESSURE: 72 MMHG | HEART RATE: 83 BPM | WEIGHT: 165.35 LBS | BODY MASS INDEX: 27.51 KG/M2 | OXYGEN SATURATION: 99 % | RESPIRATION RATE: 16 BRPM | SYSTOLIC BLOOD PRESSURE: 108 MMHG | TEMPERATURE: 97.9 F

## 2018-06-14 DIAGNOSIS — N76.0 ACUTE VAGINITIS: ICD-10-CM

## 2018-06-14 LAB
ALBUMIN SERPL ELPH-MCNC: 4.1 G/DL — SIGNIFICANT CHANGE UP (ref 3.3–5)
ALP SERPL-CCNC: 82 U/L — SIGNIFICANT CHANGE UP (ref 40–120)
ALT FLD-CCNC: 13 U/L — SIGNIFICANT CHANGE UP (ref 10–45)
ANION GAP SERPL CALC-SCNC: 14 MMOL/L — SIGNIFICANT CHANGE UP (ref 5–17)
AST SERPL-CCNC: 15 U/L — SIGNIFICANT CHANGE UP (ref 10–40)
BASOPHILS # BLD AUTO: 0.1 K/UL — SIGNIFICANT CHANGE UP (ref 0–0.2)
BASOPHILS NFR BLD AUTO: 1 % — SIGNIFICANT CHANGE UP (ref 0–2)
BILIRUB SERPL-MCNC: 0.2 MG/DL — SIGNIFICANT CHANGE UP (ref 0.2–1.2)
BUN SERPL-MCNC: 21 MG/DL — SIGNIFICANT CHANGE UP (ref 7–23)
CALCIUM SERPL-MCNC: 9.2 MG/DL — SIGNIFICANT CHANGE UP (ref 8.4–10.5)
CHLORIDE SERPL-SCNC: 103 MMOL/L — SIGNIFICANT CHANGE UP (ref 96–108)
CO2 SERPL-SCNC: 24 MMOL/L — SIGNIFICANT CHANGE UP (ref 22–31)
CREAT SERPL-MCNC: 0.58 MG/DL — SIGNIFICANT CHANGE UP (ref 0.5–1.3)
EOSINOPHIL # BLD AUTO: 0.1 K/UL — SIGNIFICANT CHANGE UP (ref 0–0.5)
EOSINOPHIL NFR BLD AUTO: 1.6 % — SIGNIFICANT CHANGE UP (ref 0–6)
GLUCOSE SERPL-MCNC: 152 MG/DL — HIGH (ref 70–99)
HCT VFR BLD CALC: 33.9 % — LOW (ref 34.5–45)
HGB BLD-MCNC: 11.9 G/DL — SIGNIFICANT CHANGE UP (ref 11.5–15.5)
LYMPHOCYTES # BLD AUTO: 2 K/UL — SIGNIFICANT CHANGE UP (ref 1–3.3)
LYMPHOCYTES # BLD AUTO: 35.8 % — SIGNIFICANT CHANGE UP (ref 13–44)
MCHC RBC-ENTMCNC: 32 PG — SIGNIFICANT CHANGE UP (ref 27–34)
MCHC RBC-ENTMCNC: 35.1 G/DL — SIGNIFICANT CHANGE UP (ref 32–36)
MCV RBC AUTO: 91.2 FL — SIGNIFICANT CHANGE UP (ref 80–100)
MONOCYTES # BLD AUTO: 0.4 K/UL — SIGNIFICANT CHANGE UP (ref 0–0.9)
MONOCYTES NFR BLD AUTO: 6.6 % — SIGNIFICANT CHANGE UP (ref 2–14)
NEUTROPHILS # BLD AUTO: 3.1 K/UL — SIGNIFICANT CHANGE UP (ref 1.8–7.4)
NEUTROPHILS NFR BLD AUTO: 55.1 % — SIGNIFICANT CHANGE UP (ref 43–77)
PLATELET # BLD AUTO: 218 K/UL — SIGNIFICANT CHANGE UP (ref 150–400)
POTASSIUM SERPL-MCNC: 4.2 MMOL/L — SIGNIFICANT CHANGE UP (ref 3.5–5.3)
POTASSIUM SERPL-SCNC: 4.2 MMOL/L — SIGNIFICANT CHANGE UP (ref 3.5–5.3)
PROT SERPL-MCNC: 6.8 G/DL — SIGNIFICANT CHANGE UP (ref 6–8.3)
RBC # BLD: 3.72 M/UL — LOW (ref 3.8–5.2)
RBC # FLD: 12.2 % — SIGNIFICANT CHANGE UP (ref 10.3–14.5)
SODIUM SERPL-SCNC: 141 MMOL/L — SIGNIFICANT CHANGE UP (ref 135–145)
WBC # BLD: 5.6 K/UL — SIGNIFICANT CHANGE UP (ref 3.8–10.5)
WBC # FLD AUTO: 5.6 K/UL — SIGNIFICANT CHANGE UP (ref 3.8–10.5)

## 2018-06-14 PROCEDURE — G0463: CPT

## 2018-06-14 PROCEDURE — 99213 OFFICE O/P EST LOW 20 MIN: CPT | Mod: NC

## 2018-06-25 DIAGNOSIS — R10.2 PELVIC AND PERINEAL PAIN: ICD-10-CM

## 2018-07-31 PROBLEM — I25.10 ATHEROSCLEROTIC HEART DISEASE OF NATIVE CORONARY ARTERY WITHOUT ANGINA PECTORIS: Chronic | Status: ACTIVE | Noted: 2017-09-06

## 2018-08-07 ENCOUNTER — APPOINTMENT (OUTPATIENT)
Dept: FAMILY MEDICINE | Facility: HOSPITAL | Age: 58
End: 2018-08-07
Payer: COMMERCIAL

## 2018-08-07 ENCOUNTER — OUTPATIENT (OUTPATIENT)
Dept: OUTPATIENT SERVICES | Facility: HOSPITAL | Age: 58
LOS: 1 days | End: 2018-08-07
Payer: SELF-PAY

## 2018-08-07 ENCOUNTER — EMERGENCY (EMERGENCY)
Facility: HOSPITAL | Age: 58
LOS: 1 days | Discharge: HOME | End: 2018-08-07
Attending: EMERGENCY MEDICINE | Admitting: EMERGENCY MEDICINE
Payer: SELF-PAY

## 2018-08-07 VITALS
DIASTOLIC BLOOD PRESSURE: 79 MMHG | RESPIRATION RATE: 17 BRPM | TEMPERATURE: 98 F | WEIGHT: 177.91 LBS | OXYGEN SATURATION: 99 % | SYSTOLIC BLOOD PRESSURE: 122 MMHG | HEIGHT: 65 IN | HEART RATE: 87 BPM

## 2018-08-07 VITALS
DIASTOLIC BLOOD PRESSURE: 79 MMHG | BODY MASS INDEX: 29.66 KG/M2 | WEIGHT: 178 LBS | HEIGHT: 65 IN | RESPIRATION RATE: 16 BRPM | TEMPERATURE: 97.8 F | SYSTOLIC BLOOD PRESSURE: 122 MMHG | OXYGEN SATURATION: 99 % | HEART RATE: 87 BPM

## 2018-08-07 VITALS
DIASTOLIC BLOOD PRESSURE: 66 MMHG | OXYGEN SATURATION: 100 % | SYSTOLIC BLOOD PRESSURE: 111 MMHG | RESPIRATION RATE: 16 BRPM | HEART RATE: 94 BPM

## 2018-08-07 DIAGNOSIS — Z98.890 OTHER SPECIFIED POSTPROCEDURAL STATES: Chronic | ICD-10-CM

## 2018-08-07 DIAGNOSIS — Z00.00 ENCOUNTER FOR GENERAL ADULT MEDICAL EXAMINATION WITHOUT ABNORMAL FINDINGS: ICD-10-CM

## 2018-08-07 LAB
ALBUMIN SERPL ELPH-MCNC: 3.6 G/DL — SIGNIFICANT CHANGE UP (ref 3.3–5)
ALP SERPL-CCNC: 85 U/L — SIGNIFICANT CHANGE UP (ref 40–120)
ALT FLD-CCNC: 25 U/L DA — SIGNIFICANT CHANGE UP (ref 10–45)
ANION GAP SERPL CALC-SCNC: 8 MMOL/L — SIGNIFICANT CHANGE UP (ref 5–17)
APPEARANCE UR: CLEAR — SIGNIFICANT CHANGE UP
AST SERPL-CCNC: 20 U/L — SIGNIFICANT CHANGE UP (ref 10–40)
BACTERIA # UR AUTO: ABNORMAL /HPF
BASOPHILS # BLD AUTO: 0 K/UL — SIGNIFICANT CHANGE UP (ref 0–0.2)
BASOPHILS NFR BLD AUTO: 0.5 % — SIGNIFICANT CHANGE UP (ref 0–2)
BILIRUB SERPL-MCNC: 0.1 MG/DL — LOW (ref 0.2–1.2)
BILIRUB UR-MCNC: NEGATIVE — SIGNIFICANT CHANGE UP
BUN SERPL-MCNC: 18 MG/DL — SIGNIFICANT CHANGE UP (ref 7–23)
CALCIUM SERPL-MCNC: 9.3 MG/DL — SIGNIFICANT CHANGE UP (ref 8.4–10.5)
CHLORIDE SERPL-SCNC: 102 MMOL/L — SIGNIFICANT CHANGE UP (ref 96–108)
CO2 SERPL-SCNC: 26 MMOL/L — SIGNIFICANT CHANGE UP (ref 22–31)
COLOR SPEC: YELLOW — SIGNIFICANT CHANGE UP
CREAT SERPL-MCNC: 0.67 MG/DL — SIGNIFICANT CHANGE UP (ref 0.5–1.3)
DIFF PNL FLD: ABNORMAL
EOSINOPHIL # BLD AUTO: 0 K/UL — SIGNIFICANT CHANGE UP (ref 0–0.5)
EOSINOPHIL NFR BLD AUTO: 0.3 % — SIGNIFICANT CHANGE UP (ref 0–6)
EPI CELLS # UR: SIGNIFICANT CHANGE UP
GLUCOSE SERPL-MCNC: 105 MG/DL — HIGH (ref 70–99)
GLUCOSE UR QL: NEGATIVE — SIGNIFICANT CHANGE UP
HCT VFR BLD CALC: 40.5 % — SIGNIFICANT CHANGE UP (ref 34.5–45)
HGB BLD-MCNC: 13.3 G/DL — SIGNIFICANT CHANGE UP (ref 11.5–15.5)
KETONES UR-MCNC: NEGATIVE — SIGNIFICANT CHANGE UP
LEUKOCYTE ESTERASE UR-ACNC: NEGATIVE — SIGNIFICANT CHANGE UP
LYMPHOCYTES # BLD AUTO: 2 K/UL — SIGNIFICANT CHANGE UP (ref 1–3.3)
LYMPHOCYTES # BLD AUTO: 27 % — SIGNIFICANT CHANGE UP (ref 13–44)
MCHC RBC-ENTMCNC: 30.8 PG — SIGNIFICANT CHANGE UP (ref 27–34)
MCHC RBC-ENTMCNC: 32.8 GM/DL — SIGNIFICANT CHANGE UP (ref 32–36)
MCV RBC AUTO: 93.9 FL — SIGNIFICANT CHANGE UP (ref 80–100)
MONOCYTES # BLD AUTO: 0.5 K/UL — SIGNIFICANT CHANGE UP (ref 0–0.9)
MONOCYTES NFR BLD AUTO: 6.8 % — SIGNIFICANT CHANGE UP (ref 1–9)
NEUTROPHILS # BLD AUTO: 4.9 K/UL — SIGNIFICANT CHANGE UP (ref 1.8–7.4)
NEUTROPHILS NFR BLD AUTO: 65.4 % — SIGNIFICANT CHANGE UP (ref 43–77)
NITRITE UR-MCNC: NEGATIVE — SIGNIFICANT CHANGE UP
PH UR: 6 — SIGNIFICANT CHANGE UP (ref 5–8)
PLATELET # BLD AUTO: 222 K/UL — SIGNIFICANT CHANGE UP (ref 150–400)
POTASSIUM SERPL-MCNC: 3.9 MMOL/L — SIGNIFICANT CHANGE UP (ref 3.5–5.3)
POTASSIUM SERPL-SCNC: 3.9 MMOL/L — SIGNIFICANT CHANGE UP (ref 3.5–5.3)
PROT SERPL-MCNC: 7.5 G/DL — SIGNIFICANT CHANGE UP (ref 6–8.3)
PROT UR-MCNC: NEGATIVE — SIGNIFICANT CHANGE UP
RBC # BLD: 4.31 M/UL — SIGNIFICANT CHANGE UP (ref 3.8–5.2)
RBC # FLD: 11.8 % — SIGNIFICANT CHANGE UP (ref 10.3–14.5)
RBC CASTS # UR COMP ASSIST: SIGNIFICANT CHANGE UP /HPF (ref 0–4)
SODIUM SERPL-SCNC: 136 MMOL/L — SIGNIFICANT CHANGE UP (ref 135–145)
SP GR SPEC: 1.01 — SIGNIFICANT CHANGE UP (ref 1.01–1.02)
UROBILINOGEN FLD QL: NEGATIVE — SIGNIFICANT CHANGE UP
WBC # BLD: 7.4 K/UL — SIGNIFICANT CHANGE UP (ref 3.8–10.5)
WBC # FLD AUTO: 7.4 K/UL — SIGNIFICANT CHANGE UP (ref 3.8–10.5)
WBC UR QL: SIGNIFICANT CHANGE UP /HPF (ref 0–5)

## 2018-08-07 PROCEDURE — 99284 EMERGENCY DEPT VISIT MOD MDM: CPT | Mod: 25

## 2018-08-07 PROCEDURE — 74177 CT ABD & PELVIS W/CONTRAST: CPT | Mod: 26

## 2018-08-07 PROCEDURE — 85027 COMPLETE CBC AUTOMATED: CPT

## 2018-08-07 PROCEDURE — 96360 HYDRATION IV INFUSION INIT: CPT | Mod: XU

## 2018-08-07 PROCEDURE — 99284 EMERGENCY DEPT VISIT MOD MDM: CPT

## 2018-08-07 PROCEDURE — 81001 URINALYSIS AUTO W/SCOPE: CPT

## 2018-08-07 PROCEDURE — 80053 COMPREHEN METABOLIC PANEL: CPT

## 2018-08-07 PROCEDURE — 74177 CT ABD & PELVIS W/CONTRAST: CPT

## 2018-08-07 RX ORDER — ACETAMINOPHEN 500 MG
975 TABLET ORAL ONCE
Qty: 0 | Refills: 0 | Status: COMPLETED | OUTPATIENT
Start: 2018-08-07 | End: 2018-08-07

## 2018-08-07 RX ORDER — SODIUM CHLORIDE 9 MG/ML
1000 INJECTION INTRAMUSCULAR; INTRAVENOUS; SUBCUTANEOUS ONCE
Qty: 0 | Refills: 0 | Status: COMPLETED | OUTPATIENT
Start: 2018-08-07 | End: 2018-08-07

## 2018-08-07 RX ORDER — ESCITALOPRAM OXALATE 10 MG/1
0 TABLET, FILM COATED ORAL
Qty: 0 | Refills: 0 | COMMUNITY

## 2018-08-07 RX ORDER — IOHEXOL 300 MG/ML
30 INJECTION, SOLUTION INTRAVENOUS ONCE
Qty: 0 | Refills: 0 | Status: COMPLETED | OUTPATIENT
Start: 2018-08-07 | End: 2018-08-07

## 2018-08-07 RX ORDER — METOPROLOL TARTRATE 50 MG
0 TABLET ORAL
Qty: 0 | Refills: 0 | COMMUNITY

## 2018-08-07 RX ADMIN — SODIUM CHLORIDE 1000 MILLILITER(S): 9 INJECTION INTRAMUSCULAR; INTRAVENOUS; SUBCUTANEOUS at 14:47

## 2018-08-07 RX ADMIN — IOHEXOL 30 MILLILITER(S): 300 INJECTION, SOLUTION INTRAVENOUS at 16:30

## 2018-08-07 RX ADMIN — Medication 975 MILLIGRAM(S): at 14:46

## 2018-08-07 RX ADMIN — SODIUM CHLORIDE 1000 MILLILITER(S): 9 INJECTION INTRAMUSCULAR; INTRAVENOUS; SUBCUTANEOUS at 15:47

## 2018-08-07 NOTE — ED PROVIDER NOTE - PROGRESS NOTE DETAILS
PA Baseil: Pt tolerating PO. CT shows cholelithiasis, no evidence of cholecystitis. Will dc home with outpt surgery f/u.

## 2018-08-07 NOTE — ED PROVIDER NOTE - OBJECTIVE STATEMENT
58 year old female, PMHx of DM, HTN, HLD, cholelithiasis, breast ca s/p R mastectomy (last chemo 2007), SHx of abdominoplasty, appendectomy, bladder prolapse; presents to the ED complaining of worsening lower abdominal pain radiating to the back for the last five days. Patient states she was having intermittent lower abdominal pain for several days. On Saturday she went to [place] for her birthday. While she was there, she ate a lot of food which made it worse, and dairy which made it significantly worse. Since then she has felt bloated, and states the pain has been constant. The pain is sharp, bilateral lower, radiating to low back, worse with movement. Patient states this pain is unlike her previous gallbladder pain. She is having normal bowel movements, last one this morning, eating and drinking normally. She denies fevers, chills, nausea, vomiting, diarrhea, dysuria or other complaints. States pain is the worst around her abdominoplasty scar. Pt sent from family practice clinic.

## 2018-08-07 NOTE — ED PROVIDER NOTE - PLAN OF CARE
Stop your metformin for 2 days  Avoid fatty or fried foods  Follow up with your PMD 2-3 days  Follow up with a surgeon outpatient for evaluation- Dr. Singh [phone]  Return to the ED for worsening

## 2018-08-07 NOTE — ED ADULT NURSE NOTE - OBJECTIVE STATEMENT
Pt ambulated to ER with chief complaint of lower abdominal pain that spreads to her back. Pt states the pain began 5 days ago and is now constant. Pt states "I have taken advil, tylenol and nothing helps, it gets worse when eating milk and cheese". No s/s of distress or agitation noted. VS stable, will continue to monitor , patient safety maintained.

## 2018-08-07 NOTE — PHYSICAL EXAM
[Well Nourished] : well nourished [Well Developed] : well developed [PERRL] : pupils equal round and reactive to light [EOMI] : extraocular movements intact [Normal Oropharynx] : the oropharynx was normal [No JVD] : no jugular venous distention [Supple] : supple [Clear to Auscultation] : lungs were clear to auscultation bilaterally [No Accessory Muscle Use] : no accessory muscle use [Normal S1, S2] : normal S1 and S2 [Normal Posterior Cervical Nodes] : no posterior cervical lymphadenopathy [Normal Anterior Cervical Nodes] : no anterior cervical lymphadenopathy [Grossly Normal Strength/Tone] : grossly normal strength/tone [de-identified] : +abdominal pain; +obesity [de-identified] : +pain on palpation of right breast INDER Parra in room as chaperone'; no masses felt ; abdominal surgery scar noted on lower abdomen [de-identified] : soft bowel sounds; distended +; diffuse pain on palpation lower abdomen

## 2018-08-07 NOTE — ED PROVIDER NOTE - PMH
Breast Cancer  2006 right mastectomy,  followed by chemo  CAD (coronary artery disease)    Diabetes Mellitus Type II    High Cholesterol    HTN (hypertension)

## 2018-08-07 NOTE — HISTORY OF PRESENT ILLNESS
[FreeTextEntry8] : CC: ABdominal Pain \par \par 57 year old female with pmh of diabetes, hld, breast cancer, abdominal surgery coming to clinic for complaints of abdominal and back pain. states pain started 5 days prior. states not assc with food. states able to tolerate po diet. states had previous abdominal surgery. states pain 10/10 non radiating. sharp in nature located in lower abdomen. states increased abdomen size. \par \par states also having breast pain with hx of breast cancer. no radiation of pain. states sharp. \par \par denies cp sob nausea vomiting diarrhea at this time. \par

## 2018-08-07 NOTE — ED PROVIDER NOTE - CARE PLAN
Principal Discharge DX:	Cholelithiasis  Assessment and plan of treatment:	Stop your metformin for 2 days  Avoid fatty or fried foods  Follow up with your PMD 2-3 days  Follow up with a surgeon outpatient for evaluation- Dr. Singh [phone]  Return to the ED for worsening

## 2018-08-07 NOTE — ED PROVIDER NOTE - ATTENDING CONTRIBUTION TO CARE
I personally evaluated the patient. I reviewed the Resident’s or Physician Assistant’s note (as assigned above), and agree with the findings and plan except as documented in my note.  Patient sent from [facility] clinic with abd pain.   No fever  PE: tenderness along surgical site

## 2018-08-07 NOTE — ED PROVIDER NOTE - CHPI ED SYMPTOMS NEG
no burning urination/no blood in stool/no diarrhea/no dysuria/no fever/no nausea/no chills/no hematuria/no vomiting

## 2018-08-07 NOTE — ED ADULT NURSE NOTE - NSIMPLEMENTINTERV_GEN_ALL_ED
Implemented All Universal Safety Interventions:  [place] to call system. Call bell, personal items and telephone within reach. Instruct patient to call for assistance. Room bathroom lighting operational. Non-slip footwear when patient is off stretcher. Physically safe environment: no spills, clutter or unnecessary equipment. Stretcher in lowest position, wheels locked, appropriate side rails in place.

## 2018-08-07 NOTE — COUNSELING
[Weight management counseling provided] : Weight management [Healthy eating counseling provided] : healthy eating [Activity counseling provided] : activity [Keep Food Diary] : Keep food diary [Low Fat Diet] : Low fat diet [Low Salt Diet] : Low salt diet [___ min/wk activity recommended] : [unfilled] min/wk activity recommended [Walking] : Walking

## 2018-08-07 NOTE — ED ADULT NURSE NOTE - CHPI ED NUR SYMPTOMS NEG
no hematuria/no fever/no dysuria/no vomiting/no nausea/no diarrhea/no burning urination/no chills/no blood in stool

## 2018-08-07 NOTE — REVIEW OF SYSTEMS
[Abdominal Pain] : abdominal pain [Back Pain] : back pain [Fever] : no fever [Chills] : no chills [Fatigue] : no fatigue [Discharge] : no discharge [Pain] : no pain [Vision Problems] : no vision problems [Earache] : no earache [Hearing Loss] : no hearing loss [Nasal Discharge] : no nasal discharge [Sore Throat] : no sore throat [Chest Pain] : no chest pain [Orthopnea] : no orthopnea [Shortness Of Breath] : no shortness of breath [Wheezing] : no wheezing [Cough] : no cough [Nausea] : no nausea [Vomiting] : no vomiting [Heartburn] : no heartburn [Dysuria] : no dysuria [Joint Pain] : no joint pain [Joint Stiffness] : no joint stiffness [Muscle Pain] : no muscle pain [Itching] : no itching [Headache] : no headache [Dizziness] : no dizziness

## 2018-08-07 NOTE — ED PROVIDER NOTE - SKIN COLOR
Well healed abdominoplasty scar- soft, non-tender, no induration/fluctuance, no discoloration or discharge./normal for race

## 2018-08-07 NOTE — ED PROVIDER NOTE - PSH
History of Appendectomy    History of Right Mastectomy    S/P abdominoplasty    S/P Breast Reconstruction  2006  S/P Sclerotherapy of Varicose Veins

## 2018-08-07 NOTE — ED PROVIDER NOTE - MEDICAL DECISION MAKING DETAILS
Abdominal pain worsening after large dairy/food intake- unlike prior cholelithiases, with minimal tenderness on exam- will draw labs, ct abd pelvis due to previous abd surgeries.

## 2018-08-07 NOTE — PLAN
[FreeTextEntry1] : 58 Year old female as above\par \par #abdominal pain \par - with hx of abdominal surgery abdominal plasty/ liposuction\par - will probably need imaging such as ct scan\par - patient signed out to Dr Mar in  ED for further workup\par \par #prediabetes\par - continue metformin \par - weight loss via diet and exercise (low fat low cholesterol diet exercise 30 minutes a day 5 times a week)\par - rtc 1 week\par \par #breast pain \par - with hx of breast cancer\par - will need repeat mammo and u/s- ordered\par \par will need Hep B vaccine next visit\par \par patient seen and examined with Dr Evans

## 2018-08-08 ENCOUNTER — FORM ENCOUNTER (OUTPATIENT)
Age: 58
End: 2018-08-08

## 2018-08-08 DIAGNOSIS — R10.9 UNSPECIFIED ABDOMINAL PAIN: ICD-10-CM

## 2018-08-08 DIAGNOSIS — R73.03 PREDIABETES: ICD-10-CM

## 2018-08-08 DIAGNOSIS — N64.4 MASTODYNIA: ICD-10-CM

## 2018-08-09 ENCOUNTER — FORM ENCOUNTER (OUTPATIENT)
Age: 58
End: 2018-08-09

## 2018-08-09 PROBLEM — I10 ESSENTIAL (PRIMARY) HYPERTENSION: Chronic | Status: ACTIVE | Noted: 2018-08-07

## 2018-08-09 PROCEDURE — 72100 X-RAY EXAM L-S SPINE 2/3 VWS: CPT

## 2018-08-09 PROCEDURE — 72100 X-RAY EXAM L-S SPINE 2/3 VWS: CPT | Mod: 26

## 2018-08-09 PROCEDURE — 72220 X-RAY EXAM SACRUM TAILBONE: CPT | Mod: 26

## 2018-08-09 PROCEDURE — 72220 X-RAY EXAM SACRUM TAILBONE: CPT

## 2018-08-09 PROCEDURE — G0463: CPT

## 2018-08-10 ENCOUNTER — OUTPATIENT (OUTPATIENT)
Dept: OUTPATIENT SERVICES | Facility: HOSPITAL | Age: 58
LOS: 1 days | End: 2018-08-10
Payer: SELF-PAY

## 2018-08-10 ENCOUNTER — APPOINTMENT (OUTPATIENT)
Dept: MRI IMAGING | Facility: HOSPITAL | Age: 58
End: 2018-08-10
Payer: COMMERCIAL

## 2018-08-10 DIAGNOSIS — Z98.890 OTHER SPECIFIED POSTPROCEDURAL STATES: Chronic | ICD-10-CM

## 2018-08-10 DIAGNOSIS — Z00.8 ENCOUNTER FOR OTHER GENERAL EXAMINATION: ICD-10-CM

## 2018-08-10 PROCEDURE — 72148 MRI LUMBAR SPINE W/O DYE: CPT | Mod: 26

## 2018-08-10 PROCEDURE — 72148 MRI LUMBAR SPINE W/O DYE: CPT

## 2018-08-13 ENCOUNTER — APPOINTMENT (OUTPATIENT)
Dept: MAMMOGRAPHY | Facility: IMAGING CENTER | Age: 58
End: 2018-08-13
Payer: COMMERCIAL

## 2018-08-13 ENCOUNTER — APPOINTMENT (OUTPATIENT)
Dept: ULTRASOUND IMAGING | Facility: IMAGING CENTER | Age: 58
End: 2018-08-13

## 2018-08-13 ENCOUNTER — OUTPATIENT (OUTPATIENT)
Dept: OUTPATIENT SERVICES | Facility: HOSPITAL | Age: 58
LOS: 1 days | End: 2018-08-13
Payer: SELF-PAY

## 2018-08-13 DIAGNOSIS — Z98.890 OTHER SPECIFIED POSTPROCEDURAL STATES: Chronic | ICD-10-CM

## 2018-08-13 DIAGNOSIS — N64.4 MASTODYNIA: ICD-10-CM

## 2018-08-13 PROCEDURE — 76642 ULTRASOUND BREAST LIMITED: CPT | Mod: 26,RT

## 2018-08-13 PROCEDURE — 76642 ULTRASOUND BREAST LIMITED: CPT

## 2018-08-15 ENCOUNTER — APPOINTMENT (OUTPATIENT)
Dept: CARDIOLOGY | Facility: HOSPITAL | Age: 58
End: 2018-08-15

## 2018-08-15 ENCOUNTER — OUTPATIENT (OUTPATIENT)
Dept: OUTPATIENT SERVICES | Facility: HOSPITAL | Age: 58
LOS: 1 days | End: 2018-08-15
Payer: SELF-PAY

## 2018-08-15 VITALS
BODY MASS INDEX: 28.79 KG/M2 | HEART RATE: 84 BPM | SYSTOLIC BLOOD PRESSURE: 118 MMHG | DIASTOLIC BLOOD PRESSURE: 79 MMHG | OXYGEN SATURATION: 98 % | WEIGHT: 173 LBS

## 2018-08-15 DIAGNOSIS — I25.10 ATHEROSCLEROTIC HEART DISEASE OF NATIVE CORONARY ARTERY WITHOUT ANGINA PECTORIS: ICD-10-CM

## 2018-08-15 DIAGNOSIS — Z98.890 OTHER SPECIFIED POSTPROCEDURAL STATES: Chronic | ICD-10-CM

## 2018-08-15 PROCEDURE — G0463: CPT

## 2018-08-16 DIAGNOSIS — R07.9 CHEST PAIN, UNSPECIFIED: ICD-10-CM

## 2018-08-18 ENCOUNTER — APPOINTMENT (OUTPATIENT)
Dept: FAMILY MEDICINE | Facility: HOSPITAL | Age: 58
End: 2018-08-18

## 2018-08-18 ENCOUNTER — OUTPATIENT (OUTPATIENT)
Dept: OUTPATIENT SERVICES | Facility: HOSPITAL | Age: 58
LOS: 1 days | End: 2018-08-18
Payer: SELF-PAY

## 2018-08-18 VITALS
OXYGEN SATURATION: 97 % | TEMPERATURE: 97.6 F | HEART RATE: 78 BPM | DIASTOLIC BLOOD PRESSURE: 84 MMHG | RESPIRATION RATE: 16 BRPM | BODY MASS INDEX: 29.99 KG/M2 | WEIGHT: 180 LBS | SYSTOLIC BLOOD PRESSURE: 113 MMHG | HEIGHT: 65 IN

## 2018-08-18 DIAGNOSIS — Z98.890 OTHER SPECIFIED POSTPROCEDURAL STATES: Chronic | ICD-10-CM

## 2018-08-18 DIAGNOSIS — Z00.00 ENCOUNTER FOR GENERAL ADULT MEDICAL EXAMINATION WITHOUT ABNORMAL FINDINGS: ICD-10-CM

## 2018-08-18 PROCEDURE — G0463: CPT

## 2018-08-18 PROCEDURE — 90746 HEPB VACCINE 3 DOSE ADULT IM: CPT

## 2018-08-18 NOTE — COUNSELING
[Weight management counseling provided] : Weight management [Healthy eating counseling provided] : healthy eating [Activity counseling provided] : activity [Low Fat Diet] : Low fat diet [Low Salt Diet] : Low salt diet [___ min/wk activity recommended] : [unfilled] min/wk activity recommended [Walking] : Walking

## 2018-08-18 NOTE — PHYSICAL EXAM
[No Acute Distress] : no acute distress [Well Nourished] : well nourished [Well Developed] : well developed [Well-Appearing] : well-appearing [PERRL] : pupils equal round and reactive to light [EOMI] : extraocular movements intact [No JVD] : no jugular venous distention [Supple] : supple [No Lymphadenopathy] : no lymphadenopathy [No Respiratory Distress] : no respiratory distress  [Clear to Auscultation] : lungs were clear to auscultation bilaterally [Normal S1, S2] : normal S1 and S2 [Non Tender] : non-tender [Non-distended] : non-distended [Normal Bowel Sounds] : normal bowel sounds [Normal Posterior Cervical Nodes] : no posterior cervical lymphadenopathy [Normal Anterior Cervical Nodes] : no anterior cervical lymphadenopathy [No Spinal Tenderness] : no spinal tenderness [de-identified] : +obese [de-identified] : +varicose veins b/l le

## 2018-08-18 NOTE — ASSESSMENT
[FreeTextEntry1] : 58 years old female as above\par \par #back pain \par - mri lspine reviewed from  8/10 with patient  - spondylosis isolated l4-5 \par - pt referrral\par - tylenol/ alleve prn for pain\par \par #callus on foot\par - would like to see podiatry - referral given \par \par #varicose veins of legs\par - compression stockings for now \par - consider vascular surgery consult if does not improve\par \par #need for 3rd hep b vaccine\par - given \par \par #obesity\par - low fat low cholesterol diet \par - exercise 30 minutes a day 5 times a week

## 2018-08-18 NOTE — REVIEW OF SYSTEMS
[Fever] : no fever [Chills] : no chills [Fatigue] : no fatigue [Discharge] : no discharge [Pain] : no pain [Vision Problems] : no vision problems [Earache] : no earache [Hearing Loss] : no hearing loss [Nasal Discharge] : no nasal discharge [Sore Throat] : no sore throat [Chest Pain] : no chest pain [Palpitations] : no palpitations [Shortness Of Breath] : no shortness of breath [Wheezing] : no wheezing [Cough] : no cough [Abdominal Pain] : no abdominal pain [Nausea] : no nausea [Back Pain] : back pain [Headache] : no headache [Dizziness] : no dizziness [de-identified] : +skin changes (varicosities, and callus)

## 2018-08-18 NOTE — HISTORY OF PRESENT ILLNESS
[FreeTextEntry1] : back pain [de-identified] : 58 year old female with pmh of diabetes, hld, breast cancer, abdominal surgery coming to clinic for follow up back pain, back pain located in lumbar spine. non radiating. 5/10 in nature. pain is dull. patient also stating is getting callus on her b/l feet and would like to see podiatry. stating is having varicose veins that are itching on both legs and itch most at night. \par \par denies cp sob nausea vomiting diarrhea at this time.

## 2018-08-20 DIAGNOSIS — M54.5 LOW BACK PAIN: ICD-10-CM

## 2018-08-20 DIAGNOSIS — L84 CORNS AND CALLOSITIES: ICD-10-CM

## 2018-08-20 DIAGNOSIS — E66.9 OBESITY, UNSPECIFIED: ICD-10-CM

## 2018-08-20 DIAGNOSIS — Z23 ENCOUNTER FOR IMMUNIZATION: ICD-10-CM

## 2018-08-31 ENCOUNTER — OUTPATIENT (OUTPATIENT)
Dept: OUTPATIENT SERVICES | Facility: HOSPITAL | Age: 58
LOS: 1 days | End: 2018-08-31
Payer: SELF-PAY

## 2018-08-31 ENCOUNTER — APPOINTMENT (OUTPATIENT)
Dept: CV DIAGNOSITCS | Facility: HOSPITAL | Age: 58
End: 2018-08-31

## 2018-08-31 DIAGNOSIS — R07.9 CHEST PAIN, UNSPECIFIED: ICD-10-CM

## 2018-08-31 DIAGNOSIS — Z98.890 OTHER SPECIFIED POSTPROCEDURAL STATES: Chronic | ICD-10-CM

## 2018-08-31 PROCEDURE — 93306 TTE W/DOPPLER COMPLETE: CPT | Mod: 26

## 2018-08-31 PROCEDURE — 93306 TTE W/DOPPLER COMPLETE: CPT

## 2018-09-11 DIAGNOSIS — H04.123 DRY EYE SYNDROME OF BILATERAL LACRIMAL GLANDS: ICD-10-CM

## 2018-09-11 DIAGNOSIS — H00.15 CHALAZION LEFT LOWER EYELID: ICD-10-CM

## 2018-09-20 ENCOUNTER — OUTPATIENT (OUTPATIENT)
Dept: OUTPATIENT SERVICES | Facility: HOSPITAL | Age: 58
LOS: 1 days | End: 2018-09-20
Payer: SELF-PAY

## 2018-09-20 ENCOUNTER — APPOINTMENT (OUTPATIENT)
Dept: PODIATRY | Facility: HOSPITAL | Age: 58
End: 2018-09-20

## 2018-09-20 VITALS
WEIGHT: 180 LBS | SYSTOLIC BLOOD PRESSURE: 123 MMHG | RESPIRATION RATE: 16 BRPM | DIASTOLIC BLOOD PRESSURE: 85 MMHG | HEART RATE: 86 BPM | OXYGEN SATURATION: 99 % | TEMPERATURE: 97.8 F | BODY MASS INDEX: 29.95 KG/M2

## 2018-09-20 DIAGNOSIS — Z98.890 OTHER SPECIFIED POSTPROCEDURAL STATES: Chronic | ICD-10-CM

## 2018-09-20 DIAGNOSIS — Z00.00 ENCOUNTER FOR GENERAL ADULT MEDICAL EXAMINATION WITHOUT ABNORMAL FINDINGS: ICD-10-CM

## 2018-09-20 PROCEDURE — G0463: CPT

## 2018-09-21 DIAGNOSIS — Z00.8 ENCOUNTER FOR OTHER GENERAL EXAMINATION: ICD-10-CM

## 2018-09-26 ENCOUNTER — NON-APPOINTMENT (OUTPATIENT)
Age: 58
End: 2018-09-26

## 2018-09-26 ENCOUNTER — APPOINTMENT (OUTPATIENT)
Dept: CARDIOLOGY | Facility: HOSPITAL | Age: 58
End: 2018-09-26

## 2018-09-26 ENCOUNTER — OUTPATIENT (OUTPATIENT)
Dept: OUTPATIENT SERVICES | Facility: HOSPITAL | Age: 58
LOS: 1 days | End: 2018-09-26
Payer: SELF-PAY

## 2018-09-26 VITALS — DIASTOLIC BLOOD PRESSURE: 71 MMHG | SYSTOLIC BLOOD PRESSURE: 113 MMHG | HEART RATE: 81 BPM

## 2018-09-26 DIAGNOSIS — Z98.890 OTHER SPECIFIED POSTPROCEDURAL STATES: Chronic | ICD-10-CM

## 2018-09-26 DIAGNOSIS — I25.10 ATHEROSCLEROTIC HEART DISEASE OF NATIVE CORONARY ARTERY WITHOUT ANGINA PECTORIS: ICD-10-CM

## 2018-09-26 PROCEDURE — 93005 ELECTROCARDIOGRAM TRACING: CPT

## 2018-09-26 PROCEDURE — G0463: CPT

## 2018-09-26 RX ORDER — OMEPRAZOLE 20 MG/1
20 CAPSULE, DELAYED RELEASE ORAL
Qty: 1 | Refills: 2 | Status: COMPLETED | COMMUNITY
Start: 2017-07-20 | End: 2018-09-26

## 2018-09-27 DIAGNOSIS — R07.9 CHEST PAIN, UNSPECIFIED: ICD-10-CM

## 2018-09-27 DIAGNOSIS — R00.2 PALPITATIONS: ICD-10-CM

## 2018-10-10 ENCOUNTER — NON-APPOINTMENT (OUTPATIENT)
Age: 58
End: 2018-10-10

## 2018-10-12 ENCOUNTER — OUTPATIENT (OUTPATIENT)
Dept: OUTPATIENT SERVICES | Facility: HOSPITAL | Age: 58
LOS: 1 days | Discharge: HOME | End: 2018-10-12

## 2018-10-12 DIAGNOSIS — Z98.890 OTHER SPECIFIED POSTPROCEDURAL STATES: Chronic | ICD-10-CM

## 2018-10-12 DIAGNOSIS — C50.919 MALIGNANT NEOPLASM OF UNSPECIFIED SITE OF UNSPECIFIED FEMALE BREAST: ICD-10-CM

## 2018-10-15 ENCOUNTER — OUTPATIENT (OUTPATIENT)
Dept: OUTPATIENT SERVICES | Facility: HOSPITAL | Age: 58
LOS: 1 days | End: 2018-10-15
Payer: SELF-PAY

## 2018-10-15 ENCOUNTER — MEDICATION RENEWAL (OUTPATIENT)
Age: 58
End: 2018-10-15

## 2018-10-15 ENCOUNTER — APPOINTMENT (OUTPATIENT)
Dept: FAMILY MEDICINE | Facility: HOSPITAL | Age: 58
End: 2018-10-15

## 2018-10-15 VITALS
HEART RATE: 89 BPM | RESPIRATION RATE: 16 BRPM | SYSTOLIC BLOOD PRESSURE: 119 MMHG | DIASTOLIC BLOOD PRESSURE: 77 MMHG | OXYGEN SATURATION: 97 % | BODY MASS INDEX: 29.79 KG/M2 | WEIGHT: 179 LBS | TEMPERATURE: 98.4 F

## 2018-10-15 DIAGNOSIS — Z00.00 ENCOUNTER FOR GENERAL ADULT MEDICAL EXAMINATION WITHOUT ABNORMAL FINDINGS: ICD-10-CM

## 2018-10-15 DIAGNOSIS — Z98.890 OTHER SPECIFIED POSTPROCEDURAL STATES: Chronic | ICD-10-CM

## 2018-10-15 PROCEDURE — G0463: CPT

## 2018-10-15 NOTE — PHYSICAL EXAM
[No Acute Distress] : no acute distress [Well Nourished] : well nourished [Well Developed] : well developed [Normal Sclera/Conjunctiva] : normal sclera/conjunctiva [PERRL] : pupils equal round and reactive to light [No JVD] : no jugular venous distention [Supple] : supple [No Lymphadenopathy] : no lymphadenopathy [No Respiratory Distress] : no respiratory distress  [Clear to Auscultation] : lungs were clear to auscultation bilaterally [No Accessory Muscle Use] : no accessory muscle use [Normal Rate] : normal rate  [Regular Rhythm] : with a regular rhythm [Normal S1, S2] : normal S1 and S2 [Soft] : abdomen soft [Non Tender] : non-tender [Non-distended] : non-distended [No HSM] : no HSM [Normal Bowel Sounds] : normal bowel sounds [No Joint Swelling] : no joint swelling [Grossly Normal Strength/Tone] : grossly normal strength/tone [No Rash] : no rash [Normal Gait] : normal gait [de-identified] : Pharyngeal injection  [de-identified] : thyromegaly is present

## 2018-10-15 NOTE — HISTORY OF PRESENT ILLNESS
[FreeTextEntry8] :  58 year old female complaining of sore throat, cough, and running nose for the past three days.  Cough is non-productive and nasal discharge is clear.  Pain is only associated with headaches .\par Pts  was sick before her. Pt tried NyQuil but it provided only minimal relief. Pt denies fevers, chills, sinus pain, congestion, ear pain.

## 2018-10-15 NOTE — ASSESSMENT
[FreeTextEntry1] :  58 year old female complaining of sore throat, cough, and running nose for the past three days.\par \par #Viral URI\par -symptomatic treatment\par -Lozenges and fluids\par \par # Thyromegaly- nontender \par -US neck\par \par d/w with Dr Landa \par \par RTC if symptoms gets worse

## 2018-10-18 DIAGNOSIS — J06.9 ACUTE UPPER RESPIRATORY INFECTION, UNSPECIFIED: ICD-10-CM

## 2018-10-18 DIAGNOSIS — E04.9 NONTOXIC GOITER, UNSPECIFIED: ICD-10-CM

## 2018-10-22 ENCOUNTER — APPOINTMENT (OUTPATIENT)
Dept: HEMATOLOGY ONCOLOGY | Facility: CLINIC | Age: 58
End: 2018-10-22

## 2018-10-22 VITALS
WEIGHT: 182.98 LBS | SYSTOLIC BLOOD PRESSURE: 112 MMHG | OXYGEN SATURATION: 95 % | BODY MASS INDEX: 30.45 KG/M2 | RESPIRATION RATE: 16 BRPM | HEART RATE: 83 BPM | DIASTOLIC BLOOD PRESSURE: 72 MMHG | TEMPERATURE: 98 F

## 2018-10-22 RX ORDER — POLYETHYLENE GLYCOL 3350, SODIUM SULFATE ANHYDROUS, SODIUM BICARBONATE, SODIUM CHLORIDE, POTASSIUM CHLORIDE 227.1; 21.5; 6.36; 5.53; .754 G/L; G/L; G/L; G/L; G/L
227.1 POWDER, FOR SOLUTION ORAL
Qty: 1 | Refills: 0 | Status: DISCONTINUED | COMMUNITY
Start: 2018-03-20 | End: 2018-10-22

## 2018-10-22 RX ORDER — TRETINOIN 1 MG/G
0.1 CREAM TOPICAL
Qty: 1 | Refills: 0 | Status: DISCONTINUED | COMMUNITY
Start: 2017-11-30 | End: 2018-10-22

## 2019-02-05 ENCOUNTER — FORM ENCOUNTER (OUTPATIENT)
Age: 59
End: 2019-02-05

## 2019-02-05 LAB
HBA1C MFR BLD HPLC: 6.4
LDLC SERPL DIRECT ASSAY-MCNC: 81

## 2019-02-06 ENCOUNTER — LABORATORY RESULT (OUTPATIENT)
Age: 59
End: 2019-02-06

## 2019-02-06 ENCOUNTER — APPOINTMENT (OUTPATIENT)
Age: 59
End: 2019-02-06
Payer: COMMERCIAL

## 2019-02-06 ENCOUNTER — OUTPATIENT (OUTPATIENT)
Dept: OUTPATIENT SERVICES | Facility: HOSPITAL | Age: 59
LOS: 1 days | End: 2019-02-06
Payer: SELF-PAY

## 2019-02-06 VITALS
OXYGEN SATURATION: 95 % | SYSTOLIC BLOOD PRESSURE: 94 MMHG | HEART RATE: 86 BPM | RESPIRATION RATE: 16 BRPM | WEIGHT: 170 LBS | BODY MASS INDEX: 28.29 KG/M2 | TEMPERATURE: 98.2 F | DIASTOLIC BLOOD PRESSURE: 60 MMHG

## 2019-02-06 DIAGNOSIS — Z00.00 ENCOUNTER FOR GENERAL ADULT MEDICAL EXAMINATION WITHOUT ABNORMAL FINDINGS: ICD-10-CM

## 2019-02-06 DIAGNOSIS — Z98.890 OTHER SPECIFIED POSTPROCEDURAL STATES: Chronic | ICD-10-CM

## 2019-02-06 PROCEDURE — 71046 X-RAY EXAM CHEST 2 VIEWS: CPT | Mod: 26

## 2019-02-06 PROCEDURE — G0463: CPT

## 2019-02-06 PROCEDURE — 71046 X-RAY EXAM CHEST 2 VIEWS: CPT

## 2019-02-06 RX ORDER — HYDROCORTISONE 1 G/100G
1.4 CREAM TOPICAL
Qty: 1 | Refills: 0 | Status: DISCONTINUED | COMMUNITY
Start: 2018-10-15 | End: 2019-02-06

## 2019-02-06 RX ORDER — NAPROXEN 500 MG/1
500 TABLET ORAL
Qty: 14 | Refills: 0 | Status: DISCONTINUED | COMMUNITY
Start: 2018-08-08 | End: 2019-02-06

## 2019-02-06 NOTE — HISTORY OF PRESENT ILLNESS
[FreeTextEntry8] : 58 year old female pt here today c/o of 1 month hx of chronic cough. States she has been having a dry cough for the past month.Denies any fever, chills, sob, chest pain, sick contacts.

## 2019-02-06 NOTE — ASSESSMENT
[FreeTextEntry1] : 58 year old female pt with chronic cough while on ACE inhibitor \par \par #Chronic cough\par -chest xray\par -may be ACE induced but pt apprehenisve to stop lisinopril\par -trial of z-pack. If no improvement in 1 week pt agreed to stop ACE\par -f/u in 1 week

## 2019-02-07 ENCOUNTER — OUTPATIENT (OUTPATIENT)
Dept: OUTPATIENT SERVICES | Facility: HOSPITAL | Age: 59
LOS: 1 days | End: 2019-02-07
Payer: SELF-PAY

## 2019-02-07 ENCOUNTER — APPOINTMENT (OUTPATIENT)
Dept: OBGYN | Facility: CLINIC | Age: 59
End: 2019-02-07
Payer: COMMERCIAL

## 2019-02-07 VITALS
WEIGHT: 173.13 LBS | SYSTOLIC BLOOD PRESSURE: 122 MMHG | BODY MASS INDEX: 28.84 KG/M2 | DIASTOLIC BLOOD PRESSURE: 78 MMHG | HEIGHT: 65 IN

## 2019-02-07 DIAGNOSIS — Z98.890 OTHER SPECIFIED POSTPROCEDURAL STATES: Chronic | ICD-10-CM

## 2019-02-07 DIAGNOSIS — N76.0 ACUTE VAGINITIS: ICD-10-CM

## 2019-02-07 DIAGNOSIS — R05 COUGH: ICD-10-CM

## 2019-02-07 PROCEDURE — 87086 URINE CULTURE/COLONY COUNT: CPT

## 2019-02-07 PROCEDURE — 81003 URINALYSIS AUTO W/O SCOPE: CPT

## 2019-02-07 PROCEDURE — 87624 HPV HI-RISK TYP POOLED RSLT: CPT

## 2019-02-07 PROCEDURE — 87186 SC STD MICRODIL/AGAR DIL: CPT

## 2019-02-07 PROCEDURE — 99396 PREV VISIT EST AGE 40-64: CPT | Mod: NC

## 2019-02-07 PROCEDURE — 81003 URINALYSIS AUTO W/O SCOPE: CPT | Mod: NC,QW

## 2019-02-07 PROCEDURE — G0463: CPT | Mod: 25

## 2019-02-07 PROCEDURE — 99214 OFFICE O/P EST MOD 30 MIN: CPT | Mod: NC,25

## 2019-02-07 NOTE — REVIEW OF SYSTEMS
[Abdominal Pain] : abdominal pain [Dysuria] : dysuria [Pelvic Pain] : pelvic pain [Frequency] : frequency [Nl] : Integumentary

## 2019-02-08 ENCOUNTER — LABORATORY RESULT (OUTPATIENT)
Age: 59
End: 2019-02-08

## 2019-02-08 ENCOUNTER — APPOINTMENT (OUTPATIENT)
Age: 59
End: 2019-02-08

## 2019-02-08 ENCOUNTER — OUTPATIENT (OUTPATIENT)
Dept: OUTPATIENT SERVICES | Facility: HOSPITAL | Age: 59
LOS: 1 days | End: 2019-02-08
Payer: SELF-PAY

## 2019-02-08 VITALS
BODY MASS INDEX: 28.62 KG/M2 | OXYGEN SATURATION: 95 % | SYSTOLIC BLOOD PRESSURE: 113 MMHG | WEIGHT: 172 LBS | DIASTOLIC BLOOD PRESSURE: 72 MMHG | TEMPERATURE: 97.6 F | RESPIRATION RATE: 16 BRPM | HEART RATE: 87 BPM

## 2019-02-08 DIAGNOSIS — Z00.00 ENCOUNTER FOR GENERAL ADULT MEDICAL EXAMINATION WITHOUT ABNORMAL FINDINGS: ICD-10-CM

## 2019-02-08 DIAGNOSIS — Z98.890 OTHER SPECIFIED POSTPROCEDURAL STATES: Chronic | ICD-10-CM

## 2019-02-08 LAB — HPV HIGH+LOW RISK DNA PNL CVX: SIGNIFICANT CHANGE UP

## 2019-02-08 PROCEDURE — 83036 HEMOGLOBIN GLYCOSYLATED A1C: CPT

## 2019-02-08 PROCEDURE — G0463: CPT

## 2019-02-08 PROCEDURE — 82607 VITAMIN B-12: CPT

## 2019-02-08 PROCEDURE — 84443 ASSAY THYROID STIM HORMONE: CPT

## 2019-02-08 RX ORDER — LISINOPRIL 5 MG/1
5 TABLET ORAL DAILY
Qty: 90 | Refills: 3 | Status: DISCONTINUED | COMMUNITY
Start: 2017-12-13 | End: 2019-02-08

## 2019-02-08 RX ORDER — AZITHROMYCIN 250 MG/1
250 TABLET, FILM COATED ORAL
Qty: 6 | Refills: 0 | Status: DISCONTINUED | COMMUNITY
Start: 2019-02-06 | End: 2019-02-08

## 2019-02-08 RX ORDER — NITROFURANTOIN (MONOHYDRATE/MACROCRYSTALS) 25; 75 MG/1; MG/1
100 CAPSULE ORAL
Qty: 14 | Refills: 0 | Status: DISCONTINUED | COMMUNITY
Start: 2019-02-07 | End: 2019-02-08

## 2019-02-08 NOTE — PHYSICAL EXAM
[Labia Majora] : labia major [Labia Minora] : labia minora [Atrophy] : atrophy [Cystocele] : a cystocele [Pap Obtained] : a Pap smear was performed [Normal Position] : in a normal position [Discharge] : no discharge [Dry Mucosa] : moist mucosa [Uterine Prolapse] : no uterine prolapse [Motion Tenderness] : there was no cervical motion tenderness

## 2019-02-08 NOTE — HISTORY OF PRESENT ILLNESS
[1 Year Ago] : 1 year ago [Fair] : being in fair health [Weight Concerns] : weight concens [Last Mammogram ___] : Last Mammogram was [unfilled] [Last Pap ___] : Last cervical pap smear was [unfilled] [Postmenopausal] : is postmenopausal [HPV Vaccine NA Due to Age] : HPV vaccine not available to patient due to age [Lower-Lt-Q] : left lower quadrant [Burning] : burning [Dysuria] : dysuria [Urination] : worsened by urination [Post-Menopause, No Sxs] : post-menopausal, currently without symptoms [Continuous] : no continuous [Vaginal Discharge] : no vaginal discharge [Vaginal Bleeding] : no vaginal bleeding [Pelvic Pressure] : no pelvic pressure [Sexually Active] : is not sexually active

## 2019-02-09 NOTE — ASSESSMENT
[FreeTextEntry1] : 58 years old female as above\par \par #cough\par - dry cough \par - cxr wnl 2/6\par - change acei to arb\par \par #diabetes\par - low fat low cholesterol diet \par - exercise 30 minutes a day 5 times a day\par - metformin \par - check a1c\par \par #albuminuria\par - change ace i to losartan\par \par #tired\par - check b12 folate tsh\par \par #skin discoloration\par - tretinoin\par \par rtc 1 month

## 2019-02-09 NOTE — REVIEW OF SYSTEMS
[Cough] : cough [Skin Rash] : skin rash [Fever] : no fever [Chills] : no chills [Fatigue] : no fatigue [Discharge] : no discharge [Pain] : no pain [Vision Problems] : no vision problems [Earache] : no earache [Hearing Loss] : no hearing loss [Nasal Discharge] : no nasal discharge [Sore Throat] : no sore throat [Chest Pain] : no chest pain [Palpitations] : no palpitations [Shortness Of Breath] : no shortness of breath [Wheezing] : no wheezing [Abdominal Pain] : no abdominal pain [Nausea] : no nausea [Vomiting] : no vomiting [Joint Pain] : no joint pain [Muscle Pain] : no muscle pain [Back Pain] : no back pain [Itching] : no itching [Headache] : no headache [Dizziness] : no dizziness [de-identified] : +skin changes (varicosities, and callus)

## 2019-02-09 NOTE — PHYSICAL EXAM
[No Acute Distress] : no acute distress [Well Nourished] : well nourished [Well Developed] : well developed [Well-Appearing] : well-appearing [PERRL] : pupils equal round and reactive to light [EOMI] : extraocular movements intact [Normal Outer Ear/Nose] : the outer ears and nose were normal in appearance [Normal Oropharynx] : the oropharynx was normal [No JVD] : no jugular venous distention [Supple] : supple [No Lymphadenopathy] : no lymphadenopathy [No Respiratory Distress] : no respiratory distress  [Clear to Auscultation] : lungs were clear to auscultation bilaterally [Normal S1, S2] : normal S1 and S2 [Non Tender] : non-tender [Non-distended] : non-distended [Normal Bowel Sounds] : normal bowel sounds [Normal Posterior Cervical Nodes] : no posterior cervical lymphadenopathy [Normal Anterior Cervical Nodes] : no anterior cervical lymphadenopathy [No Spinal Tenderness] : no spinal tenderness [de-identified] : +obese [de-identified] : lower mandinbular skin discoloration-> pinpoint vitiligo

## 2019-02-09 NOTE — HISTORY OF PRESENT ILLNESS
[FreeTextEntry1] : cough [de-identified] : 58 year old female coming to clinic for follow up. patient stating overall feels well however stating having dry cough >1 month. states had cxr few days prior. states cough is dry non productive. denies fever chills headache congestion, sinus pressure  states taking medications as rx

## 2019-02-10 LAB
-  AMIKACIN: SIGNIFICANT CHANGE UP
-  AMPICILLIN/SULBACTAM: SIGNIFICANT CHANGE UP
-  AMPICILLIN: SIGNIFICANT CHANGE UP
-  AZTREONAM: SIGNIFICANT CHANGE UP
-  CEFAZOLIN: SIGNIFICANT CHANGE UP
-  CEFEPIME: SIGNIFICANT CHANGE UP
-  CEFOXITIN: SIGNIFICANT CHANGE UP
-  CEFTRIAXONE: SIGNIFICANT CHANGE UP
-  CIPROFLOXACIN: SIGNIFICANT CHANGE UP
-  ERTAPENEM: SIGNIFICANT CHANGE UP
-  GENTAMICIN: SIGNIFICANT CHANGE UP
-  IMIPENEM: SIGNIFICANT CHANGE UP
-  LEVOFLOXACIN: SIGNIFICANT CHANGE UP
-  MEROPENEM: SIGNIFICANT CHANGE UP
-  NITROFURANTOIN: SIGNIFICANT CHANGE UP
-  PIPERACILLIN/TAZOBACTAM: SIGNIFICANT CHANGE UP
-  TIGECYCLINE: SIGNIFICANT CHANGE UP
-  TOBRAMYCIN: SIGNIFICANT CHANGE UP
-  TRIMETHOPRIM/SULFAMETHOXAZOLE: SIGNIFICANT CHANGE UP
CULTURE RESULTS: SIGNIFICANT CHANGE UP
FOLATE SERPL-MCNC: 17.1 NG/ML
METHOD TYPE: SIGNIFICANT CHANGE UP
ORGANISM # SPEC MICROSCOPIC CNT: SIGNIFICANT CHANGE UP
ORGANISM # SPEC MICROSCOPIC CNT: SIGNIFICANT CHANGE UP
SPECIMEN SOURCE: SIGNIFICANT CHANGE UP
TSH SERPL-ACNC: 1.53 UIU/ML
VIT B12 SERPL-MCNC: 336 PG/ML

## 2019-02-11 ENCOUNTER — RESULT REVIEW (OUTPATIENT)
Age: 59
End: 2019-02-11

## 2019-02-11 ENCOUNTER — FORM ENCOUNTER (OUTPATIENT)
Age: 59
End: 2019-02-11

## 2019-02-11 DIAGNOSIS — E11.9 TYPE 2 DIABETES MELLITUS WITHOUT COMPLICATIONS: ICD-10-CM

## 2019-02-11 DIAGNOSIS — R05 COUGH: ICD-10-CM

## 2019-02-11 DIAGNOSIS — R53.83 OTHER FATIGUE: ICD-10-CM

## 2019-02-11 LAB — CYTOLOGY SPEC DOC CYTO: SIGNIFICANT CHANGE UP

## 2019-02-12 ENCOUNTER — OUTPATIENT (OUTPATIENT)
Dept: OUTPATIENT SERVICES | Facility: HOSPITAL | Age: 59
LOS: 1 days | End: 2019-02-12
Payer: SELF-PAY

## 2019-02-12 ENCOUNTER — APPOINTMENT (OUTPATIENT)
Dept: MAMMOGRAPHY | Facility: IMAGING CENTER | Age: 59
End: 2019-02-12
Payer: COMMERCIAL

## 2019-02-12 DIAGNOSIS — N76.0 ACUTE VAGINITIS: ICD-10-CM

## 2019-02-12 DIAGNOSIS — Z01.419 ENCOUNTER FOR GYNECOLOGICAL EXAMINATION (GENERAL) (ROUTINE) WITHOUT ABNORMAL FINDINGS: ICD-10-CM

## 2019-02-12 DIAGNOSIS — Z98.890 OTHER SPECIFIED POSTPROCEDURAL STATES: Chronic | ICD-10-CM

## 2019-02-12 DIAGNOSIS — R30.0 DYSURIA: ICD-10-CM

## 2019-02-12 LAB — HBA1C MFR BLD HPLC: 8.6 %

## 2019-02-12 PROCEDURE — 77063 BREAST TOMOSYNTHESIS BI: CPT

## 2019-02-12 PROCEDURE — 77067 SCR MAMMO BI INCL CAD: CPT | Mod: 26,LT,52

## 2019-02-12 PROCEDURE — 77067 SCR MAMMO BI INCL CAD: CPT

## 2019-02-12 PROCEDURE — 77063 BREAST TOMOSYNTHESIS BI: CPT | Mod: 26,52

## 2019-02-14 ENCOUNTER — APPOINTMENT (OUTPATIENT)
Age: 59
End: 2019-02-14

## 2019-02-14 ENCOUNTER — OUTPATIENT (OUTPATIENT)
Dept: OUTPATIENT SERVICES | Facility: HOSPITAL | Age: 59
LOS: 1 days | End: 2019-02-14
Payer: SELF-PAY

## 2019-02-14 ENCOUNTER — MED ADMIN CHARGE (OUTPATIENT)
Age: 59
End: 2019-02-14

## 2019-02-14 VITALS
BODY MASS INDEX: 28.62 KG/M2 | TEMPERATURE: 97.4 F | HEART RATE: 88 BPM | RESPIRATION RATE: 16 BRPM | SYSTOLIC BLOOD PRESSURE: 120 MMHG | DIASTOLIC BLOOD PRESSURE: 80 MMHG | OXYGEN SATURATION: 98 % | WEIGHT: 172 LBS

## 2019-02-14 DIAGNOSIS — Z00.00 ENCOUNTER FOR GENERAL ADULT MEDICAL EXAMINATION WITHOUT ABNORMAL FINDINGS: ICD-10-CM

## 2019-02-14 DIAGNOSIS — Z98.890 OTHER SPECIFIED POSTPROCEDURAL STATES: Chronic | ICD-10-CM

## 2019-02-14 PROCEDURE — G0463: CPT

## 2019-02-14 PROCEDURE — 90471 IMMUNIZATION ADMIN: CPT

## 2019-02-14 RX ORDER — GLYBURIDE 5 MG/1
5 TABLET ORAL
Qty: 180 | Refills: 0 | Status: DISCONTINUED | COMMUNITY
Start: 2019-02-12 | End: 2019-02-14

## 2019-02-14 NOTE — PHYSICAL EXAM
[No Acute Distress] : no acute distress [Well Nourished] : well nourished [Well Developed] : well developed [Well-Appearing] : well-appearing [PERRL] : pupils equal round and reactive to light [EOMI] : extraocular movements intact [Normal Outer Ear/Nose] : the outer ears and nose were normal in appearance [Normal Oropharynx] : the oropharynx was normal [No JVD] : no jugular venous distention [Supple] : supple [No Lymphadenopathy] : no lymphadenopathy [No Respiratory Distress] : no respiratory distress  [Clear to Auscultation] : lungs were clear to auscultation bilaterally [Normal S1, S2] : normal S1 and S2 [Non Tender] : non-tender [Non-distended] : non-distended [Normal Bowel Sounds] : normal bowel sounds [Normal Posterior Cervical Nodes] : no posterior cervical lymphadenopathy [Normal Anterior Cervical Nodes] : no anterior cervical lymphadenopathy [No Spinal Tenderness] : no spinal tenderness [Normal Gait] : normal gait [No Focal Deficits] : no focal deficits [de-identified] : +obese [de-identified] : lower mandinbular skin discoloration-> pinpoint vitiligo

## 2019-02-14 NOTE — ASSESSMENT
[FreeTextEntry1] : 58 years old female as above\par \par #cough\par - dry cough \par - cxr wnl 2/6\par - change acei to arb\par - patient states does not want medications at this time - offered flonase \par - advised to follow up in 1 month if not improved\par \par #diabetes\par = w/ episodes of hypoglycemia when bid glyburide \par - low fat low cholesterol diet \par - exercise 30 minutes a day 5 times a day\par - metformin\par - glyburide 5mg once a day - advised to take with food and po hydration \par - dm educator referral \par - nutritionist referral\par - a1c 8.6\par - diabetes emergency plan d/w patient- go to er if hypoglycemia not controlled or not feeling well \par \par $uti \par - followed w/ gyn and abx per gyn (per patient)\par \par #skin discoloration now with dryness\par - tretinoin and moisturizer \par \par #need for flu shot\par - given\par \par rtc 1 month

## 2019-02-14 NOTE — REVIEW OF SYSTEMS
[Fever] : no fever [Chills] : no chills [Fatigue] : no fatigue [Discharge] : no discharge [Pain] : no pain [Vision Problems] : no vision problems [Earache] : no earache [Hearing Loss] : no hearing loss [Nasal Discharge] : no nasal discharge [Sore Throat] : no sore throat [Chest Pain] : no chest pain [Palpitations] : no palpitations [Shortness Of Breath] : no shortness of breath [Wheezing] : no wheezing [Cough] : cough [Abdominal Pain] : no abdominal pain [Nausea] : no nausea [Vomiting] : no vomiting [Joint Pain] : no joint pain [Muscle Pain] : no muscle pain [Back Pain] : no back pain [Itching] : no itching [Skin Rash] : skin rash [Headache] : no headache [Dizziness] : no dizziness [de-identified] : +skin changes (varicosities, and callus)

## 2019-02-14 NOTE — HISTORY OF PRESENT ILLNESS
[FreeTextEntry1] : hypoglycemia [de-identified] : patient is a 58 year old female coming to clinic with complaints of feeling sick after starting glyburide. patient states has been feeling week and sick at night after taking medication. states measured her sugar yesterday found to be 51 then took juice. patient states did not come in yesterday as previously advised as wanted to see how medications worked for her. states did not want to go to er last night as well. (i advised should go seek medical attn immediately if this happens). patient stating facial discoloration has improved however now having some skin dryness.

## 2019-02-15 DIAGNOSIS — Z23 ENCOUNTER FOR IMMUNIZATION: ICD-10-CM

## 2019-02-15 DIAGNOSIS — E11.9 TYPE 2 DIABETES MELLITUS WITHOUT COMPLICATIONS: ICD-10-CM

## 2019-02-21 ENCOUNTER — APPOINTMENT (OUTPATIENT)
Age: 59
End: 2019-02-21

## 2019-02-21 ENCOUNTER — OUTPATIENT (OUTPATIENT)
Dept: OUTPATIENT SERVICES | Facility: HOSPITAL | Age: 59
LOS: 1 days | End: 2019-02-21
Payer: SELF-PAY

## 2019-02-21 VITALS
DIASTOLIC BLOOD PRESSURE: 70 MMHG | HEART RATE: 92 BPM | OXYGEN SATURATION: 97 % | BODY MASS INDEX: 29.12 KG/M2 | TEMPERATURE: 97.5 F | RESPIRATION RATE: 16 BRPM | WEIGHT: 175 LBS | SYSTOLIC BLOOD PRESSURE: 116 MMHG

## 2019-02-21 DIAGNOSIS — Z98.890 OTHER SPECIFIED POSTPROCEDURAL STATES: Chronic | ICD-10-CM

## 2019-02-21 DIAGNOSIS — Z00.00 ENCOUNTER FOR GENERAL ADULT MEDICAL EXAMINATION WITHOUT ABNORMAL FINDINGS: ICD-10-CM

## 2019-02-21 PROCEDURE — G0463: CPT

## 2019-02-21 RX ORDER — GLYBURIDE 5 MG/1
5 TABLET ORAL DAILY
Qty: 90 | Refills: 2 | Status: DISCONTINUED | COMMUNITY
Start: 2019-02-14 | End: 2019-02-21

## 2019-02-21 RX ORDER — NITROFURANTOIN (MONOHYDRATE/MACROCRYSTALS) 25; 75 MG/1; MG/1
100 CAPSULE ORAL
Qty: 14 | Refills: 0 | Status: DISCONTINUED | COMMUNITY
Start: 2019-02-11 | End: 2019-02-21

## 2019-02-21 NOTE — PHYSICAL EXAM
[No Acute Distress] : no acute distress [Well Nourished] : well nourished [Well Developed] : well developed [Well-Appearing] : well-appearing [PERRL] : pupils equal round and reactive to light [EOMI] : extraocular movements intact [Normal Outer Ear/Nose] : the outer ears and nose were normal in appearance [Normal Oropharynx] : the oropharynx was normal [No JVD] : no jugular venous distention [Supple] : supple [No Lymphadenopathy] : no lymphadenopathy [No Respiratory Distress] : no respiratory distress  [Clear to Auscultation] : lungs were clear to auscultation bilaterally [Normal S1, S2] : normal S1 and S2 [Non Tender] : non-tender [Non-distended] : non-distended [Normal Bowel Sounds] : normal bowel sounds [Normal Posterior Cervical Nodes] : no posterior cervical lymphadenopathy [Normal Anterior Cervical Nodes] : no anterior cervical lymphadenopathy [No Spinal Tenderness] : no spinal tenderness [Normal Gait] : normal gait [No Focal Deficits] : no focal deficits [de-identified] : +obese [de-identified] : refused foot exam

## 2019-02-21 NOTE — HISTORY OF PRESENT ILLNESS
[FreeTextEntry1] : follow up diabetes  [de-identified] : patient is a 58 year old female coming to clinic for follow up diabetes. patient states overall is feeling well however stating has some low sugars after lunch. states takes juice after

## 2019-02-21 NOTE — ASSESSMENT
[FreeTextEntry1] : 58 year old female as above \par \par #diabetes \par - change glyburide to glypizide 2.5mg \par - advised to take with food\par - advised to not take if does not eat \par - advised continue metformin \par - low fat low cholesterol diet\par  - exercise 30 minutes a day 5 times a week \par - monitor fingersticks\par - follow up diabetes educator\par \par rtc 1 m

## 2019-02-21 NOTE — REVIEW OF SYSTEMS
[Fever] : no fever [Chills] : no chills [Fatigue] : no fatigue [Discharge] : no discharge [Pain] : no pain [Vision Problems] : no vision problems [Earache] : no earache [Hearing Loss] : no hearing loss [Nasal Discharge] : no nasal discharge [Sore Throat] : no sore throat [Chest Pain] : no chest pain [Palpitations] : no palpitations [Shortness Of Breath] : no shortness of breath [Wheezing] : no wheezing [Cough] : no cough [Abdominal Pain] : no abdominal pain [Nausea] : no nausea [Vomiting] : no vomiting [Joint Pain] : no joint pain [Muscle Pain] : no muscle pain [Back Pain] : no back pain [Itching] : no itching [Headache] : no headache [Dizziness] : no dizziness

## 2019-02-22 ENCOUNTER — OTHER (OUTPATIENT)
Age: 59
End: 2019-02-22

## 2019-02-22 DIAGNOSIS — E11.9 TYPE 2 DIABETES MELLITUS WITHOUT COMPLICATIONS: ICD-10-CM

## 2019-02-25 ENCOUNTER — APPOINTMENT (OUTPATIENT)
Age: 59
End: 2019-02-25
Payer: SELF-PAY

## 2019-02-25 PROCEDURE — 99215 OFFICE O/P EST HI 40 MIN: CPT

## 2019-02-25 RX ORDER — GLIPIZIDE 5 MG/1
5 TABLET ORAL DAILY
Qty: 45 | Refills: 1 | Status: COMPLETED | COMMUNITY
Start: 2019-02-21 | End: 2019-02-25

## 2019-03-14 ENCOUNTER — APPOINTMENT (OUTPATIENT)
Age: 59
End: 2019-03-14

## 2019-03-21 ENCOUNTER — OUTPATIENT (OUTPATIENT)
Dept: OUTPATIENT SERVICES | Facility: HOSPITAL | Age: 59
LOS: 1 days | End: 2019-03-21
Payer: SELF-PAY

## 2019-03-21 DIAGNOSIS — Z98.890 OTHER SPECIFIED POSTPROCEDURAL STATES: Chronic | ICD-10-CM

## 2019-03-21 DIAGNOSIS — Z00.00 ENCOUNTER FOR GENERAL ADULT MEDICAL EXAMINATION WITHOUT ABNORMAL FINDINGS: ICD-10-CM

## 2019-03-21 PROCEDURE — G0463: CPT

## 2019-04-01 ENCOUNTER — APPOINTMENT (OUTPATIENT)
Age: 59
End: 2019-04-01

## 2019-04-01 ENCOUNTER — OUTPATIENT (OUTPATIENT)
Dept: OUTPATIENT SERVICES | Facility: HOSPITAL | Age: 59
LOS: 1 days | End: 2019-04-01
Payer: SELF-PAY

## 2019-04-01 VITALS
SYSTOLIC BLOOD PRESSURE: 118 MMHG | OXYGEN SATURATION: 97 % | DIASTOLIC BLOOD PRESSURE: 74 MMHG | HEART RATE: 77 BPM | WEIGHT: 175 LBS | TEMPERATURE: 97.7 F | RESPIRATION RATE: 14 BRPM | BODY MASS INDEX: 29.12 KG/M2

## 2019-04-01 DIAGNOSIS — Z98.890 OTHER SPECIFIED POSTPROCEDURAL STATES: Chronic | ICD-10-CM

## 2019-04-01 DIAGNOSIS — Z00.00 ENCOUNTER FOR GENERAL ADULT MEDICAL EXAMINATION WITHOUT ABNORMAL FINDINGS: ICD-10-CM

## 2019-04-01 PROCEDURE — 85652 RBC SED RATE AUTOMATED: CPT

## 2019-04-01 PROCEDURE — G0463: CPT

## 2019-04-01 PROCEDURE — 86140 C-REACTIVE PROTEIN: CPT

## 2019-04-01 RX ORDER — FLUCONAZOLE 150 MG/1
150 TABLET ORAL
Qty: 2 | Refills: 0 | Status: COMPLETED | COMMUNITY
Start: 2019-02-19 | End: 2019-04-01

## 2019-04-01 NOTE — HISTORY OF PRESENT ILLNESS
[FreeTextEntry8] : Pt declined use of  for this visit. Implications of  refusal on healthcare encounter extensively discussed. Understanding assessed via teach back method. All other questions answered.\par \par 58F w/hx of breast cancer s/p tx here inw walk-in clinic w/complaints of R jaw painx 3days. Pt states that had face lift a few years ago. Now has scars behind her ears BL. +itching of the scar, rubs her ears constantly to alleviate the discomfort. No discharge from ear, no ear pain/redness, no fevers/chills, no cough/congestion/rhinorrhea. States pain on jaw is a tight sensation that is worse when she opens her mouth widely especially when she yawns. States no throbbing/redness/swelling in her temporal region. No changes in vision. \par

## 2019-04-01 NOTE — PHYSICAL EXAM
[No Acute Distress] : no acute distress [Well Nourished] : well nourished [Well Developed] : well developed [Well-Appearing] : well-appearing [Normal Outer Ear/Nose] : the outer ears and nose were normal in appearance [Normal Oropharynx] : the oropharynx was normal [Normal TMs] : both tympanic membranes were normal [Normal Nasal Mucosa] : the nasal mucosa was normal [Supple] : supple [No Lymphadenopathy] : no lymphadenopathy [No Respiratory Distress] : no respiratory distress  [Clear to Auscultation] : lungs were clear to auscultation bilaterally [No Accessory Muscle Use] : no accessory muscle use [Normal Rate] : normal rate  [Regular Rhythm] : with a regular rhythm [Normal S1, S2] : normal S1 and S2 [de-identified] : Nontender on palpation of TMJ, states feels tight; Mastoid w/contracted scars/slight keloid

## 2019-04-01 NOTE — PLAN
[FreeTextEntry1] : 58F as above.\par \par #Scar contracture ddx includes Temporal arteritis\par - ESR/CRP\par - Dermatology referral for possible intralesional cortisone injection\par - RTC on Friday for f/u\par - Ibuprofen for pain do not exceed >1800mg/day take with food; encouraged PO intake\par - If you experience dizziness/blurry vision/loss of vision, temporal throbbing please go to ED or call 9-1-1 immediately. Understanding assessed via teach back method. All other questions answered. \par \par Discussed w/Dr. Landa\par

## 2019-04-01 NOTE — REVIEW OF SYSTEMS
[Fever] : no fever [Chills] : no chills [Earache] : no earache [Hearing Loss] : no hearing loss [Hoarseness] : no hoarseness [Nasal Discharge] : no nasal discharge [Sore Throat] : no sore throat [Postnasal Drip] : no postnasal drip [Chest Pain] : no chest pain [Palpitations] : no palpitations [Shortness Of Breath] : no shortness of breath [Cough] : no cough [FreeTextEntry4] : pain/itching sensation in her R mastoid region

## 2019-04-02 LAB
CRP SERPL-MCNC: 0.27 MG/DL
ERYTHROCYTE [SEDIMENTATION RATE] IN BLOOD BY WESTERGREN METHOD: 24 MM/HR

## 2019-04-04 ENCOUNTER — APPOINTMENT (OUTPATIENT)
Age: 59
End: 2019-04-04

## 2019-04-04 ENCOUNTER — OUTPATIENT (OUTPATIENT)
Dept: OUTPATIENT SERVICES | Facility: HOSPITAL | Age: 59
LOS: 1 days | End: 2019-04-04
Payer: SELF-PAY

## 2019-04-04 VITALS
TEMPERATURE: 97.9 F | BODY MASS INDEX: 29.12 KG/M2 | WEIGHT: 175 LBS | RESPIRATION RATE: 14 BRPM | SYSTOLIC BLOOD PRESSURE: 130 MMHG | DIASTOLIC BLOOD PRESSURE: 85 MMHG | OXYGEN SATURATION: 97 % | HEART RATE: 76 BPM

## 2019-04-04 DIAGNOSIS — Z98.890 OTHER SPECIFIED POSTPROCEDURAL STATES: Chronic | ICD-10-CM

## 2019-04-04 DIAGNOSIS — Z00.00 ENCOUNTER FOR GENERAL ADULT MEDICAL EXAMINATION WITHOUT ABNORMAL FINDINGS: ICD-10-CM

## 2019-04-04 PROCEDURE — G0463: CPT

## 2019-04-04 RX ORDER — PREDNISONE 50 MG/1
50 TABLET ORAL DAILY
Qty: 7 | Refills: 0 | Status: COMPLETED | COMMUNITY
Start: 2019-04-02 | End: 2019-04-04

## 2019-04-04 NOTE — HISTORY OF PRESENT ILLNESS
[FreeTextEntry1] : follow up [de-identified] : 58 year old female w/ diabetes coming to clinic for follow up. patient states overall feeling well. states would like medication refilled today. states did not take po steroids but feeling better. states having swelling in eyelid and would like removed has been going on for 2 years.

## 2019-04-04 NOTE — ASSESSMENT
[FreeTextEntry1] : 58 year old female as above \par \par #diabetes \par - change po medication \par - low fat low cholesterol diet\par  - exercise 30 minutes a day 5 times a week \par - monitor fingersticks\par \par #eyelid swelling\par - does not want optho referral as states saw optho few days prior and was told to follow up with derm \par - derm referral \par \par # halitosis \par - per patient would like gi referral - given \par \par #skin changes\par - repeat esr in 2 weeks\par - patient stating pain on jaw resolved on own did not take steroids advised to hold for now -> recheck esr- d/w Dr Evans

## 2019-04-04 NOTE — COUNSELING
[Weight management counseling provided] : Weight management [Healthy eating counseling provided] : healthy eating [Activity counseling provided] : activity [Needs reinforcement, provided] : Patient needs reinforcement on understanding of disease, goals and obesity follow-up plan; reinforcement was provided [Low Fat Diet] : Low fat diet [Low Salt Diet] : Low salt diet [Decrease Portions] : Decrease food portions [Keep Food Diary] : Keep food diary [___ min/wk activity recommended] : [unfilled] min/wk activity recommended [Walking] : Walking

## 2019-04-04 NOTE — PHYSICAL EXAM
[No Acute Distress] : no acute distress [Well Nourished] : well nourished [Well Developed] : well developed [Well-Appearing] : well-appearing [PERRL] : pupils equal round and reactive to light [EOMI] : extraocular movements intact [Normal Outer Ear/Nose] : the outer ears and nose were normal in appearance [Normal Oropharynx] : the oropharynx was normal [No JVD] : no jugular venous distention [Supple] : supple [No Lymphadenopathy] : no lymphadenopathy [No Respiratory Distress] : no respiratory distress  [Clear to Auscultation] : lungs were clear to auscultation bilaterally [Normal S1, S2] : normal S1 and S2 [Non Tender] : non-tender [Non-distended] : non-distended [Normal Bowel Sounds] : normal bowel sounds [Normal Posterior Cervical Nodes] : no posterior cervical lymphadenopathy [Normal Anterior Cervical Nodes] : no anterior cervical lymphadenopathy [No Spinal Tenderness] : no spinal tenderness [Grossly Normal Strength/Tone] : grossly normal strength/tone [No Skin Lesions] : no skin lesions [Normal Gait] : normal gait [No Focal Deficits] : no focal deficits [de-identified] : +obese [de-identified] : no skin lesions noted on foot; normal sensaiton b/l feet

## 2019-04-05 DIAGNOSIS — R68.84 JAW PAIN: ICD-10-CM

## 2019-04-05 DIAGNOSIS — R19.6 HALITOSIS: ICD-10-CM

## 2019-04-05 DIAGNOSIS — E11.9 TYPE 2 DIABETES MELLITUS WITHOUT COMPLICATIONS: ICD-10-CM

## 2019-04-05 DIAGNOSIS — H92.10 OTORRHEA, UNSPECIFIED EAR: ICD-10-CM

## 2019-04-05 DIAGNOSIS — H02.9 UNSPECIFIED DISORDER OF EYELID: ICD-10-CM

## 2019-05-03 ENCOUNTER — APPOINTMENT (OUTPATIENT)
Age: 59
End: 2019-05-03

## 2019-05-03 ENCOUNTER — RESULT CHARGE (OUTPATIENT)
Age: 59
End: 2019-05-03

## 2019-05-03 ENCOUNTER — OUTPATIENT (OUTPATIENT)
Dept: OUTPATIENT SERVICES | Facility: HOSPITAL | Age: 59
LOS: 1 days | End: 2019-05-03
Payer: SELF-PAY

## 2019-05-03 VITALS
BODY MASS INDEX: 34.95 KG/M2 | HEART RATE: 81 BPM | WEIGHT: 178 LBS | OXYGEN SATURATION: 97 % | TEMPERATURE: 97.5 F | RESPIRATION RATE: 14 BRPM | DIASTOLIC BLOOD PRESSURE: 71 MMHG | HEIGHT: 60 IN | SYSTOLIC BLOOD PRESSURE: 107 MMHG

## 2019-05-03 DIAGNOSIS — Z00.00 ENCOUNTER FOR GENERAL ADULT MEDICAL EXAMINATION WITHOUT ABNORMAL FINDINGS: ICD-10-CM

## 2019-05-03 DIAGNOSIS — Z98.890 OTHER SPECIFIED POSTPROCEDURAL STATES: Chronic | ICD-10-CM

## 2019-05-03 LAB — HBA1C MFR BLD HPLC: 6.7

## 2019-05-03 PROCEDURE — G0463: CPT

## 2019-05-03 NOTE — PHYSICAL EXAM
[No Acute Distress] : no acute distress [Well Nourished] : well nourished [Well Developed] : well developed [Well-Appearing] : well-appearing [PERRL] : pupils equal round and reactive to light [EOMI] : extraocular movements intact [Normal Outer Ear/Nose] : the outer ears and nose were normal in appearance [Normal Oropharynx] : the oropharynx was normal [Supple] : supple [No JVD] : no jugular venous distention [No Lymphadenopathy] : no lymphadenopathy [No Respiratory Distress] : no respiratory distress  [Clear to Auscultation] : lungs were clear to auscultation bilaterally [Normal S1, S2] : normal S1 and S2 [Non Tender] : non-tender [Non-distended] : non-distended [Normal Bowel Sounds] : normal bowel sounds [Normal Posterior Cervical Nodes] : no posterior cervical lymphadenopathy [Normal Anterior Cervical Nodes] : no anterior cervical lymphadenopathy [Grossly Normal Strength/Tone] : grossly normal strength/tone [No Spinal Tenderness] : no spinal tenderness [Normal Gait] : normal gait [No Focal Deficits] : no focal deficits [Normal Insight/Judgement] : insight and judgment were intact [Normal Affect] : the affect was normal [de-identified] : +obese [de-identified] : keloids located behind b/l ears; skin discoloration noted below left eyelid

## 2019-05-03 NOTE — PLAN
[FreeTextEntry1] : 58 year old female as above \par \par #diabetes \par - metformin \par - a1c poct today 6.7\par - low fat low cholesterol diet\par  - exercise 30 minutes a day 5 times a week \par \par #keloids behind ears b/l and skin discoloration \par - [place]\par - now with keloids\par - advised no creams \par - advised stop tretinoin\par - follow up plastic surgery\par \par rtc 1 month

## 2019-05-03 NOTE — COUNSELING
[Weight management counseling provided] : Weight management [Activity counseling provided] : activity [Healthy eating counseling provided] : healthy eating [Low Fat Diet] : Low fat diet [Needs reinforcement, provided] : Patient needs reinforcement on understanding of disease, goals and obesity follow-up plan; reinforcement was provided [Low Salt Diet] : Low salt diet [Decrease Portions] : Decrease food portions [Keep Food Diary] : Keep food diary [___ min/wk activity recommended] : [unfilled] min/wk activity recommended [Walking] : Walking

## 2019-05-03 NOTE — REVIEW OF SYSTEMS
[Fever] : no fever [Chills] : no chills [Fatigue] : no fatigue [Discharge] : no discharge [Pain] : no pain [Vision Problems] : no vision problems [Earache] : no earache [Hearing Loss] : no hearing loss [Nasal Discharge] : no nasal discharge [Sore Throat] : no sore throat [Chest Pain] : no chest pain [Palpitations] : no palpitations [Shortness Of Breath] : no shortness of breath [Wheezing] : no wheezing [Cough] : no cough [Abdominal Pain] : no abdominal pain [Nausea] : no nausea [Vomiting] : no vomiting [Dysuria] : no dysuria [Joint Pain] : no joint pain [Hematuria] : no hematuria [Muscle Pain] : no muscle pain [Back Pain] : no back pain [Itching] : no itching [Headache] : no headache [Dizziness] : no dizziness [de-identified] : +skin changes/ discoloration

## 2019-05-03 NOTE — HISTORY OF PRESENT ILLNESS
[FreeTextEntry1] : cc; follow up diabetes \par  [de-identified] : 58 year old female w/ diabetes coming to clinic for follow up. patient states overall feeling well. states taking medications as rx. states gained some weight over past few weeks. states is having discomfort behind b/l ears. states itchy however no draining. [place] in [place] now not happy with scar. denies fevers chills night sweats cp sob. denies pain\par  \par

## 2019-05-06 DIAGNOSIS — E11.9 TYPE 2 DIABETES MELLITUS WITHOUT COMPLICATIONS: ICD-10-CM

## 2019-05-06 DIAGNOSIS — H02.9 UNSPECIFIED DISORDER OF EYELID: ICD-10-CM

## 2019-05-06 DIAGNOSIS — L91.0 HYPERTROPHIC SCAR: ICD-10-CM

## 2019-05-08 ENCOUNTER — RX RENEWAL (OUTPATIENT)
Age: 59
End: 2019-05-08

## 2019-05-14 ENCOUNTER — APPOINTMENT (OUTPATIENT)
Dept: GASTROENTEROLOGY | Facility: HOSPITAL | Age: 59
End: 2019-05-14

## 2019-06-03 NOTE — ED ADULT NURSE NOTE - BREATHING, MLM
Please relay this information to the patient and see if he is interested in proceeding.   Spontaneous, unlabored and symmetrical

## 2019-06-10 ENCOUNTER — APPOINTMENT (OUTPATIENT)
Dept: OPHTHALMOLOGY | Facility: CLINIC | Age: 59
End: 2019-06-10

## 2019-06-21 ENCOUNTER — APPOINTMENT (OUTPATIENT)
Dept: OPHTHALMOLOGY | Facility: CLINIC | Age: 59
End: 2019-06-21

## 2019-09-06 ENCOUNTER — APPOINTMENT (OUTPATIENT)
Dept: FAMILY MEDICINE | Facility: HOSPITAL | Age: 59
End: 2019-09-06
Payer: SELF-PAY

## 2019-09-06 ENCOUNTER — OUTPATIENT (OUTPATIENT)
Dept: OUTPATIENT SERVICES | Facility: HOSPITAL | Age: 59
LOS: 1 days | End: 2019-09-06
Payer: SELF-PAY

## 2019-09-06 ENCOUNTER — MED ADMIN CHARGE (OUTPATIENT)
Age: 59
End: 2019-09-06

## 2019-09-06 ENCOUNTER — RESULT CHARGE (OUTPATIENT)
Age: 59
End: 2019-09-06

## 2019-09-06 VITALS
BODY MASS INDEX: 34.37 KG/M2 | RESPIRATION RATE: 14 BRPM | HEART RATE: 78 BPM | SYSTOLIC BLOOD PRESSURE: 128 MMHG | WEIGHT: 176 LBS | OXYGEN SATURATION: 98 % | TEMPERATURE: 97.9 F | DIASTOLIC BLOOD PRESSURE: 84 MMHG

## 2019-09-06 DIAGNOSIS — Z98.890 OTHER SPECIFIED POSTPROCEDURAL STATES: Chronic | ICD-10-CM

## 2019-09-06 DIAGNOSIS — Z00.00 ENCOUNTER FOR GENERAL ADULT MEDICAL EXAMINATION WITHOUT ABNORMAL FINDINGS: ICD-10-CM

## 2019-09-06 NOTE — ASSESSMENT
[FreeTextEntry1] : 58 y/o F with PMH of DM,depression and s/p R breast cancer and mastectomy came to the clinic for follow up of DM.  Pt found with an Hb1Ac 7.4%. Pt reported was not compliant with diet while she was in [place] for 2 months.\par \par #1 Diabetes mellitus uncontrolled\par - Hb1Ac in increase trend from 6.7% ( may, 2019) to 7.4 %.\par - Normal monofilament test.\par - Pt counseled  to about the importance of weight control and a diet low in carbohydrates in the management of DM.\par - Pt counseled to do a meal plan and food dairy. \par - Continue with Metformin 1,000 mg PO BID. \par -  Microalbumin in urine ordered. \par - Pt refused  nutritionist referral. \par \par #2  Skin irritation due to a chemical peel\par - Pt oriented to stop using the topical cream with vit c prescribed in [place].\par -  Pt oriented to use hypoallergenic sunscreen 30 min before sun exposure.  \par -  Use daily moisturizer.\par -  Dermatologist referral given.\par \par #3  PMH of myocardial bridge\par -  Pt with hx of myocardial bridge and chemotherapy treatment with cardiotoxic agent is in need of evaluation with an echocardiogram.\par - Echocardiogram was ordered.\par \par #4 Health maintenance\par - Flu vaccination given. \par \par RTC in 3 months

## 2019-09-06 NOTE — COUNSELING
[Potential consequences of obesity discussed] : Potential consequences of obesity discussed [Encouraged to maintain food diary] : Encouraged to maintain food diary [Encouraged to increase physical activity] : Encouraged to increase physical activity [Encouraged to use exercise tracking device] : Encouraged to use exercise tracking device [Good understanding] : Patient has a good understanding of disease, goals and obesity follow-up plan

## 2019-09-06 NOTE — PHYSICAL EXAM
[Normal] : affect was normal and insight and judgment were intact [Comprehensive Foot Exam Normal] : Right and left foot were examined and both feet are normal. No ulcers in either foot. Toes are normal and with full ROM.  Normal tactile sensation with monofilament testing throughout both feet [de-identified] : Face redness more prominent in the forehead.

## 2019-09-06 NOTE — REVIEW OF SYSTEMS
[Negative] : Heme/Lymph [Itching] : no itching [Mole Changes] : no mole changes [Nail Changes] : no nail changes [Hair Changes] : no hair changes [Skin Rash] : no skin rash [de-identified] : Redness in the face after a chemical peeling

## 2019-09-06 NOTE — HISTORY OF PRESENT ILLNESS
[FreeTextEntry1] : DM follow up [de-identified] : 58 y/o F with PMH of DM, depression, anxiety, obesity, myocardial bridge and s/p R breast cancer and mastectomy came for a follow up of DM. Pt report  is feeling well but complaint of skin irritation after a chemical peel  done in [place] one month ago. She denies face burning or pain but continues with face redness and would  like to be evaluated by a Dermatologist. She is using a topical cream with  niacinamide/ vit c prescribed in [place] and report makes the skin worse. \par \par  She refer was in  [place] for two months and during that time was not eating healthy food.  She states that since two weeks ago is eating healthier and doing exercise 3 times weekly. She reports is compliant with medications. She takes the blood sugar at home and levels are between . She refer improvement in previous reported palpitations since is taking Metoprolol. She also report her anxiety and depression is well controlled with escitalopram. She denies polyuria, polydipsia, lower extremities numbness and cramps, chest pain, SOB, change in weight and any other symptoms.

## 2019-09-07 PROCEDURE — 82043 UR ALBUMIN QUANTITATIVE: CPT

## 2019-09-07 PROCEDURE — G0463: CPT

## 2019-09-07 PROCEDURE — 93306 TTE W/DOPPLER COMPLETE: CPT | Mod: 26

## 2019-09-07 PROCEDURE — 93306 TTE W/DOPPLER COMPLETE: CPT

## 2019-09-09 ENCOUNTER — APPOINTMENT (OUTPATIENT)
Dept: OPHTHALMOLOGY | Facility: CLINIC | Age: 59
End: 2019-09-09

## 2019-09-10 DIAGNOSIS — E11.9 TYPE 2 DIABETES MELLITUS WITHOUT COMPLICATIONS: ICD-10-CM

## 2019-09-13 LAB
CREAT SPEC-SCNC: 82 MG/DL
HBA1C MFR BLD HPLC: 7.4
MICROALBUMIN 24H UR DL<=1MG/L-MCNC: <1.2 MG/DL
MICROALBUMIN/CREAT 24H UR-RTO: NORMAL MG/G

## 2019-10-02 ENCOUNTER — RX RENEWAL (OUTPATIENT)
Age: 59
End: 2019-10-02

## 2019-10-10 ENCOUNTER — APPOINTMENT (OUTPATIENT)
Dept: DERMATOLOGY | Facility: HOSPITAL | Age: 59
End: 2019-10-10

## 2019-10-10 ENCOUNTER — OUTPATIENT (OUTPATIENT)
Dept: OUTPATIENT SERVICES | Facility: HOSPITAL | Age: 59
LOS: 1 days | End: 2019-10-10
Payer: SELF-PAY

## 2019-10-10 VITALS
HEART RATE: 80 BPM | BODY MASS INDEX: 28.66 KG/M2 | WEIGHT: 172 LBS | RESPIRATION RATE: 16 BRPM | HEIGHT: 65 IN | DIASTOLIC BLOOD PRESSURE: 70 MMHG | SYSTOLIC BLOOD PRESSURE: 113 MMHG

## 2019-10-10 DIAGNOSIS — Z98.890 OTHER SPECIFIED POSTPROCEDURAL STATES: Chronic | ICD-10-CM

## 2019-10-10 DIAGNOSIS — L98.9 DISORDER OF THE SKIN AND SUBCUTANEOUS TISSUE, UNSPECIFIED: ICD-10-CM

## 2019-10-10 DIAGNOSIS — L30.9 DERMATITIS, UNSPECIFIED: ICD-10-CM

## 2019-10-10 DIAGNOSIS — L91.0 HYPERTROPHIC SCAR: ICD-10-CM

## 2019-10-10 PROCEDURE — 11900 INJECT SKIN LESIONS </W 7: CPT

## 2019-10-10 PROCEDURE — G0463: CPT

## 2019-12-05 ENCOUNTER — APPOINTMENT (OUTPATIENT)
Dept: FAMILY MEDICINE | Facility: HOSPITAL | Age: 59
End: 2019-12-05

## 2019-12-05 ENCOUNTER — RESULT CHARGE (OUTPATIENT)
Age: 59
End: 2019-12-05

## 2019-12-05 ENCOUNTER — OUTPATIENT (OUTPATIENT)
Dept: OUTPATIENT SERVICES | Facility: HOSPITAL | Age: 59
LOS: 1 days | End: 2019-12-05
Payer: SELF-PAY

## 2019-12-05 VITALS
SYSTOLIC BLOOD PRESSURE: 131 MMHG | TEMPERATURE: 97.6 F | WEIGHT: 174 LBS | HEART RATE: 71 BPM | DIASTOLIC BLOOD PRESSURE: 80 MMHG | OXYGEN SATURATION: 95 % | BODY MASS INDEX: 28.96 KG/M2 | RESPIRATION RATE: 16 BRPM

## 2019-12-05 DIAGNOSIS — Z98.890 OTHER SPECIFIED POSTPROCEDURAL STATES: Chronic | ICD-10-CM

## 2019-12-05 DIAGNOSIS — Z00.00 ENCOUNTER FOR GENERAL ADULT MEDICAL EXAMINATION WITHOUT ABNORMAL FINDINGS: ICD-10-CM

## 2019-12-05 PROCEDURE — G0463: CPT

## 2019-12-05 NOTE — ED ADULT NURSE NOTE - CAS TRG GEN SKIN CONDITION
[facility]    December 5, 2019    Suzi Mckoy  [address]   [place] [facility] [address]      Dear Suzi,    I am a clinic care coordinator who works with Avelina Quinonez NP at [facility]. I wanted to introduce myself and provide you with my contact information so that you can call me with questions or concerns about your health care. Below is a description of clinic care coordination and how I can further assist you.     The clinic care coordinator is a registered nurse and/or  who understand the health care system. The goal of clinic care coordination is to help you manage your health and improve access to the [facility] system in the most efficient manner. The registered nurse can assist you in meeting your health care goals by providing education, coordinating services, and strengthening the communication among your providers (call the clinic directly to speak with a nurse). The  can assist you with financial, behavioral, psychosocial, chemical dependency, counseling, and/or psychiatric resources.    Please feel free to contact me at [phone], with any questions or concerns. We at [facility] are focused on providing you with the highest-quality healthcare experience possible and that all starts with you.     Sincerely,     Beatriz Webber, [facility]     Warm/[place]ry

## 2019-12-06 LAB — HBA1C MFR BLD HPLC: 6.6

## 2019-12-06 NOTE — ASSESSMENT
[FreeTextEntry1] : 60 yo F here with medication concerns\par \par #medication concerns\par - offered ibersartan instead of losartan, but patient refused\par - made patient aware of the risks of not having medication for aquiles-protection \par - patient accepted the risks and wants to control her weight and diet on her own and re-check her DM status in 3 months, if worsening or no improvement she will consider taking the ibersartan\par \par RTC in 3 months to follow up

## 2019-12-06 NOTE — HISTORY OF PRESENT ILLNESS
[FreeTextEntry8] : 58 yo F with PMHx DM2, breast cancer s/p mastectomy and reconstructive surgery presents with concerns of changing her losartan to a different medication. She took her last pill the night before and no longer wants to take it because of news reports she heard regarding a carcinogen. She has tried lisinopril in the past but experienced a cough. She is aware that she takes it for prevention of diabetic nephropathy but she is more concerned about cancer recurrence with her given history.

## 2019-12-06 NOTE — PHYSICAL EXAM
[Well Nourished] : well nourished [No Acute Distress] : no acute distress [Supple] : supple [Well-Appearing] : well-appearing [Well Developed] : well developed [No Respiratory Distress] : no respiratory distress  [Thyroid Normal, No Nodules] : the thyroid was normal and there were no nodules present [Clear to Auscultation] : lungs were clear to auscultation bilaterally [Normal Rate] : normal rate  [No Accessory Muscle Use] : no accessory muscle use [Normal S1, S2] : normal S1 and S2 [Regular Rhythm] : with a regular rhythm [No Edema] : there was no peripheral edema [No Spinal Tenderness] : no spinal tenderness [No Joint Swelling] : no joint swelling [Normal Gait] : normal gait [Normal Affect] : the affect was normal [Alert and Oriented x3] : oriented to person, place, and time

## 2019-12-09 DIAGNOSIS — E11.9 TYPE 2 DIABETES MELLITUS WITHOUT COMPLICATIONS: ICD-10-CM

## 2020-02-19 ENCOUNTER — LABORATORY RESULT (OUTPATIENT)
Age: 60
End: 2020-02-19

## 2020-02-20 ENCOUNTER — OUTPATIENT (OUTPATIENT)
Dept: OUTPATIENT SERVICES | Facility: HOSPITAL | Age: 60
LOS: 1 days | End: 2020-02-20
Payer: SELF-PAY

## 2020-02-20 ENCOUNTER — APPOINTMENT (OUTPATIENT)
Dept: OBGYN | Facility: CLINIC | Age: 60
End: 2020-02-20
Payer: COMMERCIAL

## 2020-02-20 ENCOUNTER — APPOINTMENT (OUTPATIENT)
Dept: MAMMOGRAPHY | Facility: IMAGING CENTER | Age: 60
End: 2020-02-20
Payer: COMMERCIAL

## 2020-02-20 VITALS
DIASTOLIC BLOOD PRESSURE: 78 MMHG | HEIGHT: 65 IN | BODY MASS INDEX: 29.76 KG/M2 | WEIGHT: 178.6 LBS | SYSTOLIC BLOOD PRESSURE: 132 MMHG

## 2020-02-20 DIAGNOSIS — R10.2 PELVIC AND PERINEAL PAIN: ICD-10-CM

## 2020-02-20 DIAGNOSIS — Z98.890 OTHER SPECIFIED POSTPROCEDURAL STATES: Chronic | ICD-10-CM

## 2020-02-20 DIAGNOSIS — Z00.00 ENCOUNTER FOR GENERAL ADULT MEDICAL EXAMINATION W/OUT ABNORMAL FINDINGS: ICD-10-CM

## 2020-02-20 DIAGNOSIS — R23.8 OTHER SKIN CHANGES: ICD-10-CM

## 2020-02-20 DIAGNOSIS — N76.0 ACUTE VAGINITIS: ICD-10-CM

## 2020-02-20 PROCEDURE — 77065 DX MAMMO INCL CAD UNI: CPT

## 2020-02-20 PROCEDURE — 87591 N.GONORRHOEAE DNA AMP PROB: CPT

## 2020-02-20 PROCEDURE — G0463: CPT

## 2020-02-20 PROCEDURE — 77065 DX MAMMO INCL CAD UNI: CPT | Mod: 26,LT

## 2020-02-20 PROCEDURE — 99214 OFFICE O/P EST MOD 30 MIN: CPT | Mod: NC

## 2020-02-20 PROCEDURE — 87491 CHLMYD TRACH DNA AMP PROBE: CPT

## 2020-02-20 PROCEDURE — G0279: CPT

## 2020-02-20 PROCEDURE — G0279: CPT | Mod: 26

## 2020-02-21 LAB
C TRACH RRNA SPEC QL NAA+PROBE: SIGNIFICANT CHANGE UP
N GONORRHOEA RRNA SPEC QL NAA+PROBE: SIGNIFICANT CHANGE UP
SPECIMEN SOURCE: SIGNIFICANT CHANGE UP

## 2020-02-23 NOTE — HISTORY OF PRESENT ILLNESS
[Currently In Menopause] : currently in menopause [Experiencing Menopausal Sxs] : not experiencing menopausal symptoms

## 2020-02-23 NOTE — PHYSICAL EXAM
[Awake] : awake [Alert] : alert [Soft] : soft [Oriented x3] : oriented to person, place, and time [Labia Majora] : labia major [Normal] : clitoris [Labia Minora] : labia minora [Pap Obtained] : a Pap smear was performed [No Bleeding] : there was no active vaginal bleeding [Normal Position] : in a normal position [Uterine Adnexae] : were not tender and not enlarged [Nl Sphincter Tone] : normal sphincter tone [No Tenderness] : no rectal tenderness [RRR, No Murmurs] : RRR, no murmurs [CTAB] : CTAB [Acute Distress] : no acute distress [Mass] : no breast mass [Tender] : non tender [Nipple Discharge] : no nipple discharge [Axillary LAD] : no axillary lymphadenopathy [Motion Tenderness] : there was no cervical motion tenderness [Adnexa Tenderness] : were not tender [Discharge] : had no discharge [Ovarian Mass (___ Cm)] : there were no adnexal masses

## 2020-02-27 ENCOUNTER — FORM ENCOUNTER (OUTPATIENT)
Age: 60
End: 2020-02-27

## 2020-02-27 ENCOUNTER — APPOINTMENT (OUTPATIENT)
Dept: FAMILY MEDICINE | Facility: HOSPITAL | Age: 60
End: 2020-02-27

## 2020-02-27 ENCOUNTER — OUTPATIENT (OUTPATIENT)
Dept: OUTPATIENT SERVICES | Facility: HOSPITAL | Age: 60
LOS: 1 days | End: 2020-02-27
Payer: SELF-PAY

## 2020-02-27 ENCOUNTER — RESULT CHARGE (OUTPATIENT)
Age: 60
End: 2020-02-27

## 2020-02-27 DIAGNOSIS — Z98.890 OTHER SPECIFIED POSTPROCEDURAL STATES: Chronic | ICD-10-CM

## 2020-02-27 DIAGNOSIS — Z00.00 ENCOUNTER FOR GENERAL ADULT MEDICAL EXAMINATION WITHOUT ABNORMAL FINDINGS: ICD-10-CM

## 2020-02-27 PROCEDURE — G0463: CPT

## 2020-02-27 RX ORDER — LOSARTAN POTASSIUM 50 MG/1
50 TABLET, FILM COATED ORAL DAILY
Qty: 90 | Refills: 3 | Status: DISCONTINUED | COMMUNITY
Start: 2019-02-08 | End: 2020-02-27

## 2020-02-27 NOTE — PHYSICAL EXAM
[Well Nourished] : well nourished [No Acute Distress] : no acute distress [Well Developed] : well developed [Supple] : supple [Well-Appearing] : well-appearing [Thyroid Normal, No Nodules] : the thyroid was normal and there were no nodules present [No Respiratory Distress] : no respiratory distress  [No Accessory Muscle Use] : no accessory muscle use [Clear to Auscultation] : lungs were clear to auscultation bilaterally [Normal Rate] : normal rate  [Regular Rhythm] : with a regular rhythm [Normal S1, S2] : normal S1 and S2 [No Edema] : there was no peripheral edema [No Spinal Tenderness] : no spinal tenderness [No Joint Swelling] : no joint swelling [Normal Gait] : normal gait [Normal Affect] : the affect was normal [Alert and Oriented x3] : oriented to person, place, and time

## 2020-02-27 NOTE — ASSESSMENT
[FreeTextEntry1] : 59F with a PMH significant for Breast Ca s/p Chemo and Right Mastectomy with reconstructive surgery involving a graft from her lower abdomen presenting with pain in that area. Recent GYN workup performed on 2/20/20 was negative for acute findings. PT to be referred fo Pelvic/Transabd US. \par \par #Pelvic Pain \par -F/u Tansabd/Pelvic US\par -Take Aleve PRN for pain 200mg to be taken with food \par \par #Future\par -RTC in 2 weeks for f/u

## 2020-02-27 NOTE — HISTORY OF PRESENT ILLNESS
[de-identified] : 59F with a PMH significant for Breast Ca s/p Chemo and Right Mastectomy with reconstructive surgery involving a graft from her lower abdomen. She was recently seen by Dr. Cisse for Pelvic Pain, where GC Chlamydia test performed at that tie returned negative with no findings on Pelvic Exam. She returns to clinic today for abd/trans vag US referral. She states the pain is an intermittent dull discomfort located in her lower abd, rated a 0/10 at baseline, exacerbated to 7/10 with heavy lifting or eating large meals, relieved with Advil. On ROS she denies any discharge/Bleeding from the incision sight, denies numbness/tingling in the area as well. Remaining ROS unremarkable

## 2020-02-28 ENCOUNTER — OUTPATIENT (OUTPATIENT)
Dept: OUTPATIENT SERVICES | Facility: HOSPITAL | Age: 60
LOS: 1 days | End: 2020-02-28
Payer: SELF-PAY

## 2020-02-28 ENCOUNTER — APPOINTMENT (OUTPATIENT)
Dept: RADIOLOGY | Facility: HOSPITAL | Age: 60
End: 2020-02-28
Payer: COMMERCIAL

## 2020-02-28 DIAGNOSIS — R10.2 PELVIC AND PERINEAL PAIN: ICD-10-CM

## 2020-02-28 DIAGNOSIS — Z00.8 ENCOUNTER FOR OTHER GENERAL EXAMINATION: ICD-10-CM

## 2020-02-28 DIAGNOSIS — Z98.890 OTHER SPECIFIED POSTPROCEDURAL STATES: Chronic | ICD-10-CM

## 2020-02-28 PROCEDURE — 76856 US EXAM PELVIC COMPLETE: CPT

## 2020-02-28 PROCEDURE — 76856 US EXAM PELVIC COMPLETE: CPT | Mod: 26

## 2020-02-28 PROCEDURE — 76830 TRANSVAGINAL US NON-OB: CPT

## 2020-02-28 PROCEDURE — 77080 DXA BONE DENSITY AXIAL: CPT | Mod: 26

## 2020-02-28 PROCEDURE — 76830 TRANSVAGINAL US NON-OB: CPT | Mod: 26

## 2020-02-28 PROCEDURE — 82043 UR ALBUMIN QUANTITATIVE: CPT

## 2020-02-28 PROCEDURE — 77080 DXA BONE DENSITY AXIAL: CPT

## 2020-03-05 LAB
CREAT SPEC-SCNC: 84 MG/DL
HBA1C MFR BLD HPLC: 6.8
MICROALBUMIN 24H UR DL<=1MG/L-MCNC: <1.2 MG/DL
MICROALBUMIN/CREAT 24H UR-RTO: NORMAL MG/G

## 2020-03-13 ENCOUNTER — APPOINTMENT (OUTPATIENT)
Dept: FAMILY MEDICINE | Facility: HOSPITAL | Age: 60
End: 2020-03-13

## 2020-03-13 ENCOUNTER — OUTPATIENT (OUTPATIENT)
Dept: OUTPATIENT SERVICES | Facility: HOSPITAL | Age: 60
LOS: 1 days | End: 2020-03-13
Payer: SELF-PAY

## 2020-03-13 VITALS
TEMPERATURE: 98.8 F | DIASTOLIC BLOOD PRESSURE: 74 MMHG | SYSTOLIC BLOOD PRESSURE: 118 MMHG | WEIGHT: 178 LBS | OXYGEN SATURATION: 97 % | RESPIRATION RATE: 15 BRPM | BODY MASS INDEX: 29.62 KG/M2 | HEART RATE: 90 BPM

## 2020-03-13 DIAGNOSIS — Z98.890 OTHER SPECIFIED POSTPROCEDURAL STATES: Chronic | ICD-10-CM

## 2020-03-13 DIAGNOSIS — Z00.00 ENCOUNTER FOR GENERAL ADULT MEDICAL EXAMINATION WITHOUT ABNORMAL FINDINGS: ICD-10-CM

## 2020-03-13 PROCEDURE — G0463: CPT

## 2020-03-13 NOTE — PLAN
[FreeTextEntry1] : 59F w/PMH of breast ca S/P chemo, R mastectomy S/P reconstructive surgery, DM2 (last A1c 6.8 Feb 2020)  presents for F/U pelvic pain. \par \par #Pelvic pain\par - Etiology remains unclear\par - Feb 2020 transabd/pelvic U/S: leiomyomatous uterus w/ small fibroids <1cm^3\par - Continue ibuprofen PRN\par - RTC in 1 month if not resolved\par \par #DM2\par - Renewed metformin, atorvastatin\par \par Case discussed w/ Dr Klein

## 2020-03-13 NOTE — HISTORY OF PRESENT ILLNESS
[FreeTextEntry1] : F/U pelvic pain [de-identified] : 59F w/PMH of breast ca S/P chemo, R mastectomy S/P reconstructive surgery, DM2 (last A1c 6.8 Feb 2020)  presents for F/U pelvic pain. Last seen for pelvic pain not present at rest but 7/10 when lifting heavy objects. GC/chlamydia negative. Transabd/pelvic U/S significant for leiomyomatous uterus w/ small fibroids <1cm^3. Today, pt reports pelvic pain unchanged, still present w/ lifting.

## 2020-03-16 DIAGNOSIS — E11.9 TYPE 2 DIABETES MELLITUS WITHOUT COMPLICATIONS: ICD-10-CM

## 2020-03-16 DIAGNOSIS — R10.2 PELVIC AND PERINEAL PAIN: ICD-10-CM

## 2020-05-18 NOTE — ED PROVIDER NOTE - ATTESTATION, MLM
84
I have reviewed and confirmed nurses' notes for patient's medications, allergies, medical history, and surgical history.

## 2020-06-24 ENCOUNTER — RESULT CHARGE (OUTPATIENT)
Age: 60
End: 2020-06-24

## 2020-06-24 ENCOUNTER — RESULT REVIEW (OUTPATIENT)
Age: 60
End: 2020-06-24

## 2020-06-24 ENCOUNTER — OUTPATIENT (OUTPATIENT)
Dept: OUTPATIENT SERVICES | Facility: HOSPITAL | Age: 60
LOS: 1 days | End: 2020-06-24
Payer: SELF-PAY

## 2020-06-24 ENCOUNTER — APPOINTMENT (OUTPATIENT)
Dept: FAMILY MEDICINE | Facility: HOSPITAL | Age: 60
End: 2020-06-24
Payer: COMMERCIAL

## 2020-06-24 VITALS
RESPIRATION RATE: 15 BRPM | HEART RATE: 85 BPM | TEMPERATURE: 97.5 F | WEIGHT: 186 LBS | DIASTOLIC BLOOD PRESSURE: 84 MMHG | BODY MASS INDEX: 30.95 KG/M2 | SYSTOLIC BLOOD PRESSURE: 119 MMHG | OXYGEN SATURATION: 95 %

## 2020-06-24 DIAGNOSIS — Z00.00 ENCOUNTER FOR GENERAL ADULT MEDICAL EXAMINATION WITHOUT ABNORMAL FINDINGS: ICD-10-CM

## 2020-06-24 DIAGNOSIS — Z98.890 OTHER SPECIFIED POSTPROCEDURAL STATES: Chronic | ICD-10-CM

## 2020-06-24 PROCEDURE — G0463: CPT

## 2020-06-24 PROCEDURE — 73140 X-RAY EXAM OF FINGER(S): CPT

## 2020-06-24 PROCEDURE — 73140 X-RAY EXAM OF FINGER(S): CPT | Mod: 26,RT

## 2020-06-25 DIAGNOSIS — M79.644 PAIN IN RIGHT FINGER(S): ICD-10-CM

## 2020-06-25 DIAGNOSIS — K21.9 GASTRO-ESOPHAGEAL REFLUX DISEASE WITHOUT ESOPHAGITIS: ICD-10-CM

## 2020-06-25 DIAGNOSIS — E11.9 TYPE 2 DIABETES MELLITUS WITHOUT COMPLICATIONS: ICD-10-CM

## 2020-06-26 NOTE — HISTORY OF PRESENT ILLNESS
[FreeTextEntry8] : Right finger pain and acid reflux \par \par 60 y/o F with PMHx of breast CA s/p chemo, R mastectomy s/p recontructive surgery, DM 2 presents to clinic for 2 problems\par \par 1) Right finger pain\par Pt complains of right middle finger pain, rated 2/10. Denies trauma or injury. Pain is worse with flexion. States she has been taking OTC Ibuprofen, reports transient relief in symptoms \par \par 2) Acid reflux \par Reports Hx of esophagitis, diagnosed on endoscopy done in 2018. Complaints of burning epigastric pain. Reports of metallic taste in the morning. Admits to drinking caffeinated drinks and late dinner. States she has been taking OTC Nexium with transient relief in symptoms

## 2020-06-26 NOTE — REVIEW OF SYSTEMS
[Heartburn] : heartburn [Joint Pain] : joint pain [Negative] : Respiratory [Abdominal Pain] : no abdominal pain [Nausea] : no nausea [Melena] : no melena [Diarrhea] : diarrhea [Constipation] : no constipation [Joint Swelling] : no joint swelling [Muscle Weakness] : no muscle weakness [Joint Stiffness] : no joint stiffness [Nail Changes] : no nail changes

## 2020-06-26 NOTE — ASSESSMENT
[FreeTextEntry1] : 60 y/o F with PMHx of breast ca s/p chemo, r mastectomy s/p reconstructive surgery, Hx of DM presents with \par \par \par #Right Middle Finger pain \par -Naproxen 500mg BID x5-7 days \par -take it with food \par -cold compress\par -X-ray of right middle finger \par \par #Esophagitis \par -endoscopy results reviewed\par -colonoscopy reviewed 5 years ago, repeat in 2020\par -Omeprazole 20mg BID \par -H.pylori antigen test \par \par \par #DM2: \par -repeat A1c: 6.9\par -c/w Metformin and Losartan \par \par #HLD\par -c/w Atorvastatin \par \par #Hx of breast Ca s/p Rt mastectomy and reconstructive surgery \par -under oncology care \par RTC in 2 weeks \par \par Case and plan discussed with Dr. Hanson

## 2020-06-26 NOTE — PHYSICAL EXAM
[Normal] : no rash [de-identified] : right middle finger: +tenderness over the medial aspect of the finger, no erythema, swelling, pain with flexion, no palpatable nodules noted, FROM, NV intact distally

## 2020-07-06 ENCOUNTER — APPOINTMENT (OUTPATIENT)
Dept: FAMILY MEDICINE | Facility: HOSPITAL | Age: 60
End: 2020-07-06

## 2020-07-06 ENCOUNTER — OUTPATIENT (OUTPATIENT)
Dept: OUTPATIENT SERVICES | Facility: HOSPITAL | Age: 60
LOS: 1 days | End: 2020-07-06
Payer: SELF-PAY

## 2020-07-06 VITALS
BODY MASS INDEX: 30.62 KG/M2 | DIASTOLIC BLOOD PRESSURE: 74 MMHG | WEIGHT: 184 LBS | RESPIRATION RATE: 16 BRPM | HEART RATE: 76 BPM | TEMPERATURE: 98.3 F | SYSTOLIC BLOOD PRESSURE: 114 MMHG | OXYGEN SATURATION: 96 %

## 2020-07-06 DIAGNOSIS — Z00.00 ENCOUNTER FOR GENERAL ADULT MEDICAL EXAMINATION WITHOUT ABNORMAL FINDINGS: ICD-10-CM

## 2020-07-06 DIAGNOSIS — Z98.890 OTHER SPECIFIED POSTPROCEDURAL STATES: Chronic | ICD-10-CM

## 2020-07-06 PROCEDURE — G0463: CPT

## 2020-07-06 RX ORDER — B-COMPLEX WITH VITAMIN C
600-200 TABLET ORAL
Qty: 90 | Refills: 3 | Status: DISCONTINUED | COMMUNITY
Start: 2018-03-01 | End: 2020-07-06

## 2020-07-06 NOTE — ASSESSMENT
[FreeTextEntry1] : D/w Dr. Antionette MD\par - RTC in 1 month for CPE. \par RTC earlier if worsening in bruising, bloody stools, dizziness, LOC, multiple falls.

## 2020-07-06 NOTE — PHYSICAL EXAM
[No Acute Distress] : no acute distress [Well Nourished] : well nourished [Well Developed] : well developed [Well-Appearing] : well-appearing [Normal Sclera/Conjunctiva] : normal sclera/conjunctiva [PERRL] : pupils equal round and reactive to light [Normal Outer Ear/Nose] : the outer ears and nose were normal in appearance [Normal Oropharynx] : the oropharynx was normal [No JVD] : no jugular venous distention [No Lymphadenopathy] : no lymphadenopathy [Supple] : supple [Thyroid Normal, No Nodules] : the thyroid was normal and there were no nodules present [No Respiratory Distress] : no respiratory distress  [No Accessory Muscle Use] : no accessory muscle use [Clear to Auscultation] : lungs were clear to auscultation bilaterally [Normal Rate] : normal rate  [Regular Rhythm] : with a regular rhythm [Normal S1, S2] : normal S1 and S2 [No Murmur] : no murmur heard [No Abdominal Bruit] : a ~M bruit was not heard ~T in the abdomen [No Varicosities] : no varicosities [Pedal Pulses Present] : the pedal pulses are present [No Edema] : there was no peripheral edema [No Palpable Aorta] : no palpable aorta [No Extremity Clubbing/Cyanosis] : no extremity clubbing/cyanosis [Soft] : abdomen soft [Non Tender] : non-tender [Non-distended] : non-distended [No Masses] : no abdominal mass palpated [Normal Bowel Sounds] : normal bowel sounds [No Joint Swelling] : no joint swelling [Grossly Normal Strength/Tone] : grossly normal strength/tone [de-identified] : FROM in all extremities and fingers/toes. [de-identified] : Right lateral thigh with extensive ecchymosis

## 2020-07-06 NOTE — HISTORY OF PRESENT ILLNESS
[FreeTextEntry8] : 59F with PMH of breast ca s/p R mastectomy with reconstructive surgery, chemo, DM2 presents with complaint of fall  x 8 days ago. States fall was mechanical. Pt fell down 5 stairs after tripping on her shoe. Bruising at the right thigh and toes. States purplish skin - improving. Denies LOC, lightheadedness, dizziness, palpitations, CP, headaches, SOB. \par Reports persistent acid reflux. Pt was tested last week for h.pylori antigen. Pt currently on omeprazole for esophagitis. \par With regards to right middle finger pain. Xray reviewed: noted no evidence of fracture or dislocation.
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
## 2020-07-07 DIAGNOSIS — M79.644 PAIN IN RIGHT FINGER(S): ICD-10-CM

## 2020-07-07 DIAGNOSIS — K21.9 GASTRO-ESOPHAGEAL REFLUX DISEASE WITHOUT ESOPHAGITIS: ICD-10-CM

## 2020-08-03 LAB
H PYLORI AB SER-ACNC: 17.2 UNITS
H PYLORI IGA SER-ACNC: 85.1 UNITS
HBA1C MFR BLD HPLC: 6.9

## 2020-09-08 ENCOUNTER — APPOINTMENT (OUTPATIENT)
Dept: GASTROENTEROLOGY | Facility: HOSPITAL | Age: 60
End: 2020-09-08

## 2020-09-08 ENCOUNTER — OUTPATIENT (OUTPATIENT)
Dept: OUTPATIENT SERVICES | Facility: HOSPITAL | Age: 60
LOS: 1 days | End: 2020-09-08
Payer: SELF-PAY

## 2020-09-08 VITALS
BODY MASS INDEX: 30.66 KG/M2 | HEIGHT: 65 IN | HEART RATE: 103 BPM | SYSTOLIC BLOOD PRESSURE: 131 MMHG | TEMPERATURE: 97 F | RESPIRATION RATE: 16 BRPM | WEIGHT: 184 LBS | DIASTOLIC BLOOD PRESSURE: 83 MMHG

## 2020-09-08 DIAGNOSIS — K76.0 FATTY (CHANGE OF) LIVER, NOT ELSEWHERE CLASSIFIED: ICD-10-CM

## 2020-09-08 DIAGNOSIS — R14.0 ABDOMINAL DISTENSION (GASEOUS): ICD-10-CM

## 2020-09-08 DIAGNOSIS — Z98.890 OTHER SPECIFIED POSTPROCEDURAL STATES: Chronic | ICD-10-CM

## 2020-09-08 DIAGNOSIS — K21.9 GASTRO-ESOPHAGEAL REFLUX DISEASE WITHOUT ESOPHAGITIS: ICD-10-CM

## 2020-09-08 DIAGNOSIS — E66.9 OBESITY, UNSPECIFIED: ICD-10-CM

## 2020-09-08 DIAGNOSIS — D12.6 BENIGN NEOPLASM OF COLON, UNSPECIFIED: ICD-10-CM

## 2020-09-08 DIAGNOSIS — R10.9 UNSPECIFIED ABDOMINAL PAIN: ICD-10-CM

## 2020-09-08 PROCEDURE — G0463: CPT

## 2020-09-08 NOTE — END OF VISIT
[] : Fellow [FreeTextEntry3] : As modified and discussed with patient\par MD RANDI [facility]\par Associate Professor of Medicine\par [facility] [employer]\par

## 2020-09-08 NOTE — REVIEW OF SYSTEMS
[As Noted in HPI] : as noted in HPI [Negative] : Heme/Lymph [Fever] : no fever [Chest Pain] : no chest pain [Chills] : no chills [Palpitations] : no palpitations [Shortness Of Breath] : no shortness of breath [SOB on Exertion] : no shortness of breath during exertion [Abdominal Pain] : no abdominal pain [Vomiting] : no vomiting [Constipation] : no constipation [Diarrhea] : no diarrhea [Melena] : no melena [Heartburn] : no heartburn [FreeTextEntry7] : positive for reflux

## 2020-09-08 NOTE — PHYSICAL EXAM
[General Appearance - Alert] : alert [General Appearance - In No Acute Distress] : in no acute distress [General Appearance - Well Nourished] : well nourished [Sclera] : the sclera and conjunctiva were normal [Extraocular Movements] : extraocular movements were intact [Auscultation Breath Sounds / Voice Sounds] : lungs were clear to auscultation bilaterally [Heart Rate And Rhythm] : heart rate was normal and rhythm regular [Heart Sounds] : normal S1 and S2 [Bowel Sounds] : normal bowel sounds [Edema] : there was no peripheral edema [Abdomen Soft] : soft [Abdomen Tenderness] : non-tender [Abdomen Mass (___ Cm)] : no abdominal mass palpated [Abnormal Walk] : normal gait [Nail Clubbing] : no clubbing  or cyanosis of the fingernails [Skin Color & Pigmentation] : normal skin color and pigmentation [] : no rash [No Focal Deficits] : no focal deficits [Oriented To Time, Place, And Person] : oriented to person, place, and time [Affect] : the affect was normal [Mood] : the mood was normal [FreeTextEntry1] : short neck

## 2020-09-08 NOTE — HISTORY OF PRESENT ILLNESS
[___ Month(s) Ago] : [unfilled] month(s) ago [Nausea] : denies nausea [Vomiting] : denies vomiting [Heartburn] : heartburn worsened [Diarrhea] : denies diarrhea [Constipation] : denies constipation [Yellow Skin Or Eyes (Jaundice)] : denies jaundice [Abdominal Pain] : denies abdominal pain [Abdominal Swelling] : denies abdominal swelling [Rectal Pain] : denies rectal pain [GERD] : gastroesophageal reflux disease [Hiatus Hernia] : no hiatus hernia [Cholelithiasis] : no cholelithiasis [Diverticulitis] : no diverticulitis [de-identified] : 60 year old female with history of breast CA (dx 2006, s/p rt mastectomy, adjuvant chemotherapy, in remission), H.pylori gastritis, which was treated and eradicated (confirmed by stool antigen).HTN, DM here for follow up. \par She was on omeprazole 20 mg bid which was increased to 40 mg BID without relief of symptoms. She c/o bad mouth odor in the morning. She has tried Nexium 20 mg bid and Omeprazole 40 mg daily without complete resolution of her symptoms.\par \par She hasn't tried taking 40 mg bid. She admits taking her meds 10 minutes before breakfast.\par She reports bloating as well.\par She denies any abdominal pain, nausea, vomiting, dysphagia, weight loss.\par \par WORKUP: \par Colonoscopy 5/2018\par -Non-bleeding internal hemorrhoid\par -Diverticulosis in the sigmoid colon\par -No specimens collected\par \par High-Resolution Esophageal Motility Study 5/2018\par -No manometric evidence of hiatal hernia\par -Normal relaxation of the LES\par -Normal swallowing study. \par \par EGD 5/2017\par Impression: - Normal esophagus.\par  - Z-line regular, 36 cm from the incisors.\par  - Small hiatus hernia.\par  - Gastritis. Biopsied. Suspicious for Portal Hypertensive Gastropathy.\par  - Normal duodenal bulb and 2nd part of the duodenum.\par  - The examination was otherwise normal.\par \par Path\par Final Diagnosis\par 1. Stomach, biopsy\par - Gastric antral mucosa with Helicobacter pylori-associated chronic active gastritis (Warthin-Starry stain).\par - Negative for intestinal metaplasia.\par 2. Stomach, body, biopsy\par - Gastric oxyntic mucosa with Helicobacter pylori-associated chronic active gastritis (Warthin-Starry stain).\par - Negative for intestinal metaplasia.\par \par Colonoscopy 12/2014\par Impression: - One 2 mm polyp in the cecum. Resected and retrieved.\par  - One 4 mm polyp in the ascending colon. Resected and retrieved.\par  - One 3 mm polyp at the hepatic flexure. Resected and retrieved.\par  - One 7 mm polyp in the sigmoid colon. Resected and retrieved. Clip was placed.\par  - Diverticulosis in the sigmoid colon and in the ascending colon.\par Path\par 1. Colon, cecum, polyp, biopsy\par - Colonic mucosa with no significant pathologic abnormality.\par 2. Colon, ascending, polyp, biopsy\par - Tubular adenoma(s).\par 3. Colon, hepatic flexure, polyp, biopsy\par - Colonic mucosa with lymphoid aggregate.\par 4. Colon, sigmoid, polyp, biopsy\par - Hyperplastic polyp.\par \par Colonoscopy 9/2011\par - Small hepatic flexure polyp.\par -Medium - sized internal hemorrhoids. \par

## 2020-09-08 NOTE — ASSESSMENT
[FreeTextEntry1] : 60 year old female with history of breast CA (dx 2006, s/p rt mastectomy, adjuvant chemotherapy, in remission), HTN, DM here for surveillance colonoscopy.\par \par Impression:\par # GERD likely due to diet/med  non-compliance.\par # Obesity BMI> 30\par # Abdominal bloating \par # Fatty liver on U/S 2015\par # History of tubular adenoma on colonoscopy 2014 and family history of colon cancer in mother.  Colonoscopy 5/2018 unremarkable.\par # H.pylori associated gastritis- s/p triple therapy\par \par Plan:\par - Counseled patient on lifestyle modification, weight loss, avoid heavy meals at night, avoiding acidic/spicy/stimulating food, moderate exercise 30 minutes / day for three times/ week\par - Advised patient to take PPI 40 mg daily (  1 hour before breakfast).  If sx persist, can also add qhs H2 blocker (i.e. ranitidine) or use 40 mg bid\par - Gastric emptying study \par - RTC in 1 month for follow up visit.   \par - Repeat colonoscopy in 5/2023 given family history of colon cancer and personal history of tubular adenoma.

## 2020-09-12 ENCOUNTER — RESULT REVIEW (OUTPATIENT)
Age: 60
End: 2020-09-12

## 2020-09-12 ENCOUNTER — OUTPATIENT (OUTPATIENT)
Dept: OUTPATIENT SERVICES | Facility: HOSPITAL | Age: 60
LOS: 1 days | End: 2020-09-12
Payer: SELF-PAY

## 2020-09-12 ENCOUNTER — APPOINTMENT (OUTPATIENT)
Dept: FAMILY MEDICINE | Facility: HOSPITAL | Age: 60
End: 2020-09-12
Payer: SELF-PAY

## 2020-09-12 VITALS
SYSTOLIC BLOOD PRESSURE: 120 MMHG | BODY MASS INDEX: 31.45 KG/M2 | TEMPERATURE: 98.2 F | DIASTOLIC BLOOD PRESSURE: 80 MMHG | OXYGEN SATURATION: 96 % | HEART RATE: 82 BPM | WEIGHT: 189 LBS | RESPIRATION RATE: 16 BRPM

## 2020-09-12 DIAGNOSIS — J35.8 OTHER CHRONIC DISEASES OF TONSILS AND ADENOIDS: ICD-10-CM

## 2020-09-12 DIAGNOSIS — Z92.29 PERSONAL HISTORY OF OTHER DRUG THERAPY: ICD-10-CM

## 2020-09-12 DIAGNOSIS — Z11.3 ENCOUNTER FOR SCREENING FOR INFECTIONS WITH A PREDOMINANTLY SEXUAL MODE OF TRANSMISSION: ICD-10-CM

## 2020-09-12 DIAGNOSIS — E11.29 TYPE 2 DIABETES MELLITUS WITH OTHER DIABETIC KIDNEY COMPLICATION: ICD-10-CM

## 2020-09-12 DIAGNOSIS — R07.81 PLEURODYNIA: ICD-10-CM

## 2020-09-12 DIAGNOSIS — Z12.11 ENCOUNTER FOR SCREENING FOR MALIGNANT NEOPLASM OF COLON: ICD-10-CM

## 2020-09-12 DIAGNOSIS — M94.0 CHONDROCOSTAL JUNCTION SYNDROME [TIETZE]: ICD-10-CM

## 2020-09-12 DIAGNOSIS — Z01.419 ENCOUNTER FOR GYNECOLOGICAL EXAMINATION (GENERAL) (ROUTINE) W/OUT ABNORMAL FINDINGS: ICD-10-CM

## 2020-09-12 DIAGNOSIS — N89.8 OTHER SPECIFIED NONINFLAMMATORY DISORDERS OF VAGINA: ICD-10-CM

## 2020-09-12 DIAGNOSIS — M89.8X9 OTHER SPECIFIED DISORDERS OF BONE, UNSPECIFIED SITE: ICD-10-CM

## 2020-09-12 DIAGNOSIS — Z86.19 PERSONAL HISTORY OF OTHER INFECTIOUS AND PARASITIC DISEASES: ICD-10-CM

## 2020-09-12 DIAGNOSIS — Z87.2 PERSONAL HISTORY OF DISEASES OF THE SKIN AND SUBCUTANEOUS TISSUE: ICD-10-CM

## 2020-09-12 DIAGNOSIS — R05 COUGH: ICD-10-CM

## 2020-09-12 DIAGNOSIS — R39.15 URGENCY OF URINATION: ICD-10-CM

## 2020-09-12 DIAGNOSIS — R10.12 LEFT UPPER QUADRANT PAIN: ICD-10-CM

## 2020-09-12 DIAGNOSIS — R68.84 JAW PAIN: ICD-10-CM

## 2020-09-12 DIAGNOSIS — R51 HEADACHE: ICD-10-CM

## 2020-09-12 DIAGNOSIS — Z87.898 PERSONAL HISTORY OF OTHER SPECIFIED CONDITIONS: ICD-10-CM

## 2020-09-12 DIAGNOSIS — Z00.00 ENCOUNTER FOR GENERAL ADULT MEDICAL EXAMINATION WITHOUT ABNORMAL FINDINGS: ICD-10-CM

## 2020-09-12 DIAGNOSIS — M79.669 PAIN IN UNSPECIFIED LOWER LEG: ICD-10-CM

## 2020-09-12 DIAGNOSIS — M25.511 PAIN IN RIGHT SHOULDER: ICD-10-CM

## 2020-09-12 DIAGNOSIS — M25.559 PAIN IN UNSPECIFIED HIP: ICD-10-CM

## 2020-09-12 DIAGNOSIS — M25.569 PAIN IN UNSPECIFIED KNEE: ICD-10-CM

## 2020-09-12 DIAGNOSIS — R10.2 PELVIC AND PERINEAL PAIN: ICD-10-CM

## 2020-09-12 DIAGNOSIS — L84 CORNS AND CALLOSITIES: ICD-10-CM

## 2020-09-12 DIAGNOSIS — L98.9 DISORDER OF THE SKIN AND SUBCUTANEOUS TISSUE, UNSPECIFIED: ICD-10-CM

## 2020-09-12 DIAGNOSIS — K80.20 CALCULUS OF GALLBLADDER W/OUT CHOLECYSTITIS W/OUT OBSTRUCTION: ICD-10-CM

## 2020-09-12 DIAGNOSIS — Z87.09 PERSONAL HISTORY OF OTHER DISEASES OF THE RESPIRATORY SYSTEM: ICD-10-CM

## 2020-09-12 DIAGNOSIS — R53.83 OTHER FATIGUE: ICD-10-CM

## 2020-09-12 DIAGNOSIS — M25.519 PAIN IN UNSPECIFIED SHOULDER: ICD-10-CM

## 2020-09-12 DIAGNOSIS — Z87.19 PERSONAL HISTORY OF OTHER DISEASES OF THE DIGESTIVE SYSTEM: ICD-10-CM

## 2020-09-12 DIAGNOSIS — F41.9 ANXIETY DISORDER, UNSPECIFIED: ICD-10-CM

## 2020-09-12 DIAGNOSIS — Z87.440 PERSONAL HISTORY OF URINARY (TRACT) INFECTIONS: ICD-10-CM

## 2020-09-12 DIAGNOSIS — R30.0 DYSURIA: ICD-10-CM

## 2020-09-12 DIAGNOSIS — R73.09 OTHER ABNORMAL GLUCOSE: ICD-10-CM

## 2020-09-12 DIAGNOSIS — M79.622 PAIN IN LEFT UPPER ARM: ICD-10-CM

## 2020-09-12 DIAGNOSIS — R10.30 LOWER ABDOMINAL PAIN, UNSPECIFIED: ICD-10-CM

## 2020-09-12 DIAGNOSIS — R14.0 ABDOMINAL DISTENSION (GASEOUS): ICD-10-CM

## 2020-09-12 DIAGNOSIS — Z86.39 PERSONAL HISTORY OF OTHER ENDOCRINE, NUTRITIONAL AND METABOLIC DISEASE: ICD-10-CM

## 2020-09-12 DIAGNOSIS — Z98.890 OTHER SPECIFIED POSTPROCEDURAL STATES: ICD-10-CM

## 2020-09-12 DIAGNOSIS — L98.8 OTHER SPECIFIED DISORDERS OF THE SKIN AND SUBCUTANEOUS TISSUE: ICD-10-CM

## 2020-09-12 DIAGNOSIS — N39.3 STRESS INCONTINENCE (FEMALE) (MALE): ICD-10-CM

## 2020-09-12 DIAGNOSIS — E66.3 OVERWEIGHT: ICD-10-CM

## 2020-09-12 DIAGNOSIS — Z98.890 OTHER SPECIFIED POSTPROCEDURAL STATES: Chronic | ICD-10-CM

## 2020-09-12 DIAGNOSIS — S69.90XA UNSPECIFIED INJURY OF UNSPECIFIED WRIST, HAND AND FINGER(S), INITIAL ENCOUNTER: ICD-10-CM

## 2020-09-12 DIAGNOSIS — Z87.42 PERSONAL HISTORY OF OTHER DISEASES OF THE FEMALE GENITAL TRACT: ICD-10-CM

## 2020-09-12 DIAGNOSIS — Z08 ENCOUNTER FOR FOLLOW-UP EXAMINATION AFTER COMPLETED TREATMENT FOR MALIGNANT NEOPLASM: ICD-10-CM

## 2020-09-12 DIAGNOSIS — R07.89 OTHER CHEST PAIN: ICD-10-CM

## 2020-09-12 DIAGNOSIS — H92.01 OTALGIA, RIGHT EAR: ICD-10-CM

## 2020-09-12 DIAGNOSIS — M79.644 PAIN IN RIGHT FINGER(S): ICD-10-CM

## 2020-09-12 DIAGNOSIS — M79.2 NEURALGIA AND NEURITIS, UNSPECIFIED: ICD-10-CM

## 2020-09-12 DIAGNOSIS — Z00.00 ENCOUNTER FOR GENERAL ADULT MEDICAL EXAMINATION W/OUT ABNORMAL FINDINGS: ICD-10-CM

## 2020-09-12 DIAGNOSIS — R80.9 TYPE 2 DIABETES MELLITUS WITH OTHER DIABETIC KIDNEY COMPLICATION: ICD-10-CM

## 2020-09-12 DIAGNOSIS — R23.8 OTHER SKIN CHANGES: ICD-10-CM

## 2020-09-12 DIAGNOSIS — J06.9 ACUTE UPPER RESPIRATORY INFECTION, UNSPECIFIED: ICD-10-CM

## 2020-09-12 DIAGNOSIS — N32.81 OVERACTIVE BLADDER: ICD-10-CM

## 2020-09-12 DIAGNOSIS — L65.9 NONSCARRING HAIR LOSS, UNSPECIFIED: ICD-10-CM

## 2020-09-12 DIAGNOSIS — Z01.818 ENCOUNTER FOR OTHER PREPROCEDURAL EXAMINATION: ICD-10-CM
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
## 2020-09-12 DIAGNOSIS — Z87.39 PERSONAL HISTORY OF OTHER DISEASES OF THE MUSCULOSKELETAL SYSTEM AND CONNECTIVE TISSUE: ICD-10-CM

## 2020-09-12 DIAGNOSIS — N64.4 MASTODYNIA: ICD-10-CM

## 2020-09-12 DIAGNOSIS — H02.9 UNSPECIFIED DISORDER OF EYELID: ICD-10-CM

## 2020-09-12 DIAGNOSIS — Z85.038 ENCOUNTER FOR FOLLOW-UP EXAMINATION AFTER COMPLETED TREATMENT FOR MALIGNANT NEOPLASM: ICD-10-CM

## 2020-09-12 PROCEDURE — 73502 X-RAY EXAM HIP UNI 2-3 VIEWS: CPT | Mod: 26,RT

## 2020-09-12 PROCEDURE — G0463: CPT

## 2020-09-12 PROCEDURE — 73502 X-RAY EXAM HIP UNI 2-3 VIEWS: CPT

## 2020-09-12 NOTE — HISTORY OF PRESENT ILLNESS
[FreeTextEntry8] : 61 yo female with h/o DM2, breast cancer s/p R mastectomy with reconstructive surgery, presenting today for c/o persistent R hip pain s/p mechanical fall ~2 months ago. Pt was initially seen for the pain on 7/2020, XRay of the hip however was not obtained during then, Pt was advised to apply cold compresses and to take tylenol PRN. Pt reports her pain has persisted, also with swelling of the area which has not resolved, has neither worsened. Previously had purplish bruising of the debbie which resolved. She reports pain of the R hip area especially with walking for a long duration of time, and with going up the stairs.

## 2020-09-12 NOTE — REVIEW OF SYSTEMS
[Negative] : Cardiovascular [Muscle Weakness] : no muscle weakness [Back Pain] : no back pain [Easy Bleeding] : no easy bleeding [Easy Bruising] : no easy bruising [FreeTextEntry9] : R hip pain

## 2020-09-12 NOTE — PHYSICAL EXAM
[No Acute Distress] : no acute distress [No Focal Deficits] : no focal deficits [Grossly Normal Strength/Tone] : grossly normal strength/tone [Normal Gait] : normal gait [de-identified] : no vertebral tenderness [de-identified] : negative leg raise test, no tenderness with internal and external rotation of the RUE at the level of the hip [de-identified] : marked swelling of area immediately below the R hip joint, on the lateral side; no noted ecchymosis or erythema; tenderness with direct palpation of the affected area [de-identified] : sensation intact in b/l LE; no noted limping with walking

## 2020-09-15 DIAGNOSIS — M25.551 PAIN IN RIGHT HIP: ICD-10-CM

## 2020-09-23 ENCOUNTER — OUTPATIENT (OUTPATIENT)
Dept: OUTPATIENT SERVICES | Facility: HOSPITAL | Age: 60
LOS: 1 days | End: 2020-09-23

## 2020-09-23 DIAGNOSIS — R14.0 ABDOMINAL DISTENSION (GASEOUS): ICD-10-CM

## 2020-09-23 DIAGNOSIS — Z98.890 OTHER SPECIFIED POSTPROCEDURAL STATES: Chronic | ICD-10-CM

## 2020-09-26 ENCOUNTER — OUTPATIENT (OUTPATIENT)
Dept: OUTPATIENT SERVICES | Facility: HOSPITAL | Age: 60
LOS: 1 days | End: 2020-09-26
Payer: SELF-PAY

## 2020-09-26 ENCOUNTER — APPOINTMENT (OUTPATIENT)
Dept: FAMILY MEDICINE | Facility: HOSPITAL | Age: 60
End: 2020-09-26

## 2020-09-26 ENCOUNTER — RESULT REVIEW (OUTPATIENT)
Age: 60
End: 2020-09-26

## 2020-09-26 VITALS
WEIGHT: 187 LBS | SYSTOLIC BLOOD PRESSURE: 130 MMHG | HEART RATE: 70 BPM | BODY MASS INDEX: 31.12 KG/M2 | DIASTOLIC BLOOD PRESSURE: 89 MMHG | RESPIRATION RATE: 16 BRPM | TEMPERATURE: 98.1 F | OXYGEN SATURATION: 98 %

## 2020-09-26 DIAGNOSIS — Z00.00 ENCOUNTER FOR GENERAL ADULT MEDICAL EXAMINATION WITHOUT ABNORMAL FINDINGS: ICD-10-CM

## 2020-09-26 DIAGNOSIS — Z98.890 OTHER SPECIFIED POSTPROCEDURAL STATES: Chronic | ICD-10-CM

## 2020-09-26 PROCEDURE — G0463: CPT

## 2020-09-26 PROCEDURE — 71101 X-RAY EXAM UNILAT RIBS/CHEST: CPT | Mod: 26

## 2020-09-26 PROCEDURE — 71101 X-RAY EXAM UNILAT RIBS/CHEST: CPT

## 2020-09-29 DIAGNOSIS — R07.81 PLEURODYNIA: ICD-10-CM

## 2020-09-29 DIAGNOSIS — Z91.81 HISTORY OF FALLING: ICD-10-CM

## 2020-10-01 ENCOUNTER — OUTPATIENT (OUTPATIENT)
Dept: OUTPATIENT SERVICES | Facility: HOSPITAL | Age: 60
LOS: 1 days | End: 2020-10-01
Payer: SELF-PAY

## 2020-10-01 ENCOUNTER — RESULT REVIEW (OUTPATIENT)
Age: 60
End: 2020-10-01

## 2020-10-01 ENCOUNTER — APPOINTMENT (OUTPATIENT)
Dept: MRI IMAGING | Facility: HOSPITAL | Age: 60
End: 2020-10-01
Payer: COMMERCIAL
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
## 2020-10-01 DIAGNOSIS — Z91.81 HISTORY OF FALLING: ICD-10-CM

## 2020-10-01 DIAGNOSIS — S73.191A OTHER SPRAIN OF RIGHT HIP, INITIAL ENCOUNTER: ICD-10-CM
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
## 2020-10-01 DIAGNOSIS — R07.81 PLEURODYNIA: ICD-10-CM
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
## 2020-10-01 DIAGNOSIS — Z98.890 OTHER SPECIFIED POSTPROCEDURAL STATES: Chronic | ICD-10-CM

## 2020-10-01 PROCEDURE — 73721 MRI JNT OF LWR EXTRE W/O DYE: CPT

## 2020-10-01 PROCEDURE — 73721 MRI JNT OF LWR EXTRE W/O DYE: CPT | Mod: 26,RT

## 2020-10-03 NOTE — PLAN
[FreeTextEntry1] : #Right side rib pain 2/2 mechanical fall\par -No signs or symptoms concerning for mechanical fall due to syncope/cardiac etiology\par -Naproxen 500mg PO BID PRN pain\par -Ice/heat PRN\par -XR series of ribs ordered to r/o fracture\par -RTC in 2 wks for f/u of symptoms, pt aware that pain will take some time to improve\par -MRI R hip ordered today for further evaluation of r hip swelling due to previous fall for which pt was seen at previous visit\par \par RTC in 2 wks for f/u of symptoms

## 2020-10-03 NOTE — ASSESSMENT
[FreeTextEntry1] : 61 y/o F PMHx significant for DMII, breast CA s/p R mastectomy with reconstructive surgery, presenting with a 3 day h/o right side rib pain after falling

## 2020-10-03 NOTE — REVIEW OF SYSTEMS
[Muscle Pain] : muscle pain [Fever] : no fever [Chills] : no chills [Chest Pain] : no chest pain [Palpitations] : no palpitations [Shortness Of Breath] : no shortness of breath [Wheezing] : no wheezing [Cough] : no cough [Abdominal Pain] : no abdominal pain [Nausea] : no nausea [Vomiting] : no vomiting [Headache] : no headache [Dizziness] : no dizziness [FreeTextEntry9] : Right side rib pain, right knee bruising

## 2020-10-03 NOTE — PHYSICAL EXAM
[No Acute Distress] : no acute distress [Well-Appearing] : well-appearing [Normal Sclera/Conjunctiva] : normal sclera/conjunctiva [PERRL] : pupils equal round and reactive to light [EOMI] : extraocular movements intact [No Respiratory Distress] : no respiratory distress  [No Accessory Muscle Use] : no accessory muscle use [Clear to Auscultation] : lungs were clear to auscultation bilaterally [Normal Rate] : normal rate  [Regular Rhythm] : with a regular rhythm [Normal S1, S2] : normal S1 and S2 [No Murmur] : no murmur heard [Right Breast Absent] : a total mastectomy [Breast Reconstruction Right] : breast reconstruction [No Spinal Tenderness] : no spinal tenderness [No Joint Swelling] : no joint swelling [Coordination Grossly Intact] : coordination grossly intact [Normal Gait] : normal gait [___] : no erythema [Enlargement Of The Right Breast] : no swelling [Tenderness Of The Right Breast] : no tenderness [de-identified] : Right side TTP of 6-9th ribs w/o ecchymosis or crepitus, abrasion to right knee w/o deformity or TTP, full ROM

## 2020-10-03 NOTE — HISTORY OF PRESENT ILLNESS
[Constant] : constant [FreeTextEntry8] : 59 y/o F PMHx significant for DMII, breast CA s/p R mastectomy with reconstructive surgery, presenting with a 3 day h/o right side rib pain after falling. Pt was walking and tripped stepping off of curb, falling on her right breast and right knee. She denied hitting her head. 7/10 pain on her right ribs directly under her right breast; very tender to palpation and with limited ROM of right arm due to the pain. Pt taking advil/aleve with relief. Does not note any bruising in the area. Right knee with bruise and abrasion but pt denies any pain and w/o difficulty ambulating. Denies pain in right breast but notes mild swelling and heaviness; breast reconstruction done w/o implant. \par Pt denied feeling dizziness, visual disturbance, palpitations, or LOC prior to fall. Pt also denies CP, SOB, abd pain. While walking, she underestimated the curb height because of tree debris that was obscuring the ground.

## 2020-10-06 ENCOUNTER — OUTPATIENT (OUTPATIENT)
Dept: OUTPATIENT SERVICES | Facility: HOSPITAL | Age: 60
LOS: 1 days | End: 2020-10-06

## 2020-10-06 ENCOUNTER — RESULT REVIEW (OUTPATIENT)
Age: 60
End: 2020-10-06

## 2020-10-06 ENCOUNTER — APPOINTMENT (OUTPATIENT)
Dept: NUCLEAR MEDICINE | Facility: HOSPITAL | Age: 60
End: 2020-10-06
Payer: SELF-PAY

## 2020-10-06 DIAGNOSIS — Z98.890 OTHER SPECIFIED POSTPROCEDURAL STATES: Chronic | ICD-10-CM

## 2020-10-06 DIAGNOSIS — R14.0 ABDOMINAL DISTENSION (GASEOUS): ICD-10-CM

## 2020-10-06 LAB — GLUCOSE BLDC GLUCOMTR-MCNC: 143 MG/DL — HIGH (ref 70–99)

## 2020-10-06 PROCEDURE — 78264 GASTRIC EMPTYING IMG STUDY: CPT | Mod: 26

## 2020-10-07 ENCOUNTER — APPOINTMENT (OUTPATIENT)
Dept: FAMILY MEDICINE | Facility: HOSPITAL | Age: 60
End: 2020-10-07

## 2020-10-07 ENCOUNTER — OUTPATIENT (OUTPATIENT)
Dept: OUTPATIENT SERVICES | Facility: HOSPITAL | Age: 60
LOS: 1 days | End: 2020-10-07
Payer: SELF-PAY

## 2020-10-07 VITALS
DIASTOLIC BLOOD PRESSURE: 85 MMHG | SYSTOLIC BLOOD PRESSURE: 134 MMHG | OXYGEN SATURATION: 96 % | RESPIRATION RATE: 14 BRPM | HEART RATE: 76 BPM | WEIGHT: 184 LBS | BODY MASS INDEX: 30.62 KG/M2 | TEMPERATURE: 98 F

## 2020-10-07 DIAGNOSIS — M25.551 PAIN IN RIGHT HIP: ICD-10-CM

## 2020-10-07 DIAGNOSIS — R07.81 PLEURODYNIA: ICD-10-CM

## 2020-10-07 DIAGNOSIS — Z00.00 ENCOUNTER FOR GENERAL ADULT MEDICAL EXAMINATION WITHOUT ABNORMAL FINDINGS: ICD-10-CM

## 2020-10-07 DIAGNOSIS — Z98.890 OTHER SPECIFIED POSTPROCEDURAL STATES: Chronic | ICD-10-CM

## 2020-10-07 PROCEDURE — G0463: CPT

## 2020-10-07 RX ORDER — LOSARTAN POTASSIUM 50 MG/1
50 TABLET, FILM COATED ORAL DAILY
Qty: 30 | Refills: 0 | Status: COMPLETED | COMMUNITY
Start: 2020-02-27 | End: 2020-10-07

## 2020-10-07 NOTE — PLAN
[FreeTextEntry1] : 60F with HTN, T2DM, GERD, and hx breast cancer presents for f/u R hematoma vs seroma\par -Cannot distinguish on MRI hematoma vs seroma\par -Fluid collection not causing pain or restricting function, but is of aesthetic concern to the patient\par -Referral to surgery clinic for possible drainage. Discussed with patient that the fluid may recollect

## 2020-10-07 NOTE — REVIEW OF SYSTEMS
[Fever] : no fever [Chills] : no chills [Shortness Of Breath] : no shortness of breath [Cough] : no cough [Joint Pain] : no joint pain [Joint Stiffness] : no joint stiffness [Muscle Weakness] : no muscle weakness [Muscle Pain] : no muscle pain [Itching] : no itching [Skin Rash] : no skin rash

## 2020-10-07 NOTE — PHYSICAL EXAM
[Normal] : no acute distress, well nourished, well developed and well-appearing [de-identified] : Grossly apparent bulge over the R hip. Normal strength and ROM of hips b/l. No instability or tenderness

## 2020-10-07 NOTE — HISTORY OF PRESENT ILLNESS
[FreeTextEntry1] : f/u fall [de-identified] : 60F with T2DM, HTN, GERD, and hx breast cancer presents for f/u rib and hip pain after 2 falls and to review imaging\par -Patient reports that rib and hip pain have both resolved\par -She denies weakness and restricted ROM\par -Patient is concerned with the aesthetic appearance of the lump over her right hip, which correlates with a 10.3x6.8x11. cm fluid collection visualized on MRI\par -Of note, MRI also demonstrated a minimally displaced tear at the base of the anterosuperior labrum. Patient denies pain, weakness, and instability of the R hip. Rib XR demonstrated no fractures

## 2020-10-09 DIAGNOSIS — T14.8XXA OTHER INJURY OF UNSPECIFIED BODY REGION, INITIAL ENCOUNTER: ICD-10-CM

## 2020-10-23 ENCOUNTER — OUTPATIENT (OUTPATIENT)
Dept: OUTPATIENT SERVICES | Facility: HOSPITAL | Age: 60
LOS: 1 days | End: 2020-10-23
Payer: SELF-PAY

## 2020-10-23 ENCOUNTER — RESULT CHARGE (OUTPATIENT)
Age: 60
End: 2020-10-23

## 2020-10-23 ENCOUNTER — APPOINTMENT (OUTPATIENT)
Dept: FAMILY MEDICINE | Facility: HOSPITAL | Age: 60
End: 2020-10-23

## 2020-10-23 VITALS
SYSTOLIC BLOOD PRESSURE: 119 MMHG | RESPIRATION RATE: 14 BRPM | OXYGEN SATURATION: 98 % | TEMPERATURE: 96.9 F | DIASTOLIC BLOOD PRESSURE: 80 MMHG | WEIGHT: 181 LBS | HEART RATE: 76 BPM | BODY MASS INDEX: 30.12 KG/M2

## 2020-10-23 DIAGNOSIS — Z87.19 PERSONAL HISTORY OF OTHER DISEASES OF THE DIGESTIVE SYSTEM: ICD-10-CM

## 2020-10-23 DIAGNOSIS — E55.9 VITAMIN D DEFICIENCY, UNSPECIFIED: ICD-10-CM

## 2020-10-23 DIAGNOSIS — Z86.79 PERSONAL HISTORY OF OTHER DISEASES OF THE CIRCULATORY SYSTEM: ICD-10-CM

## 2020-10-23 DIAGNOSIS — R16.0 HEPATOMEGALY, NOT ELSEWHERE CLASSIFIED: ICD-10-CM

## 2020-10-23 DIAGNOSIS — Z86.018 PERSONAL HISTORY OF OTHER BENIGN NEOPLASM: ICD-10-CM

## 2020-10-23 DIAGNOSIS — N32.81 OVERACTIVE BLADDER: ICD-10-CM

## 2020-10-23 DIAGNOSIS — Z98.890 OTHER SPECIFIED POSTPROCEDURAL STATES: Chronic | ICD-10-CM

## 2020-10-23 DIAGNOSIS — R31.29 OTHER MICROSCOPIC HEMATURIA: ICD-10-CM

## 2020-10-23 DIAGNOSIS — N39.46 MIXED INCONTINENCE: ICD-10-CM

## 2020-10-23 DIAGNOSIS — K21.9 GASTRO-ESOPHAGEAL REFLUX DISEASE W/OUT ESOPHAGITIS: ICD-10-CM

## 2020-10-23 DIAGNOSIS — Z86.59 PERSONAL HISTORY OF OTHER MENTAL AND BEHAVIORAL DISORDERS: ICD-10-CM

## 2020-10-23 DIAGNOSIS — F41.8 OTHER SPECIFIED ANXIETY DISORDERS: ICD-10-CM

## 2020-10-23 DIAGNOSIS — R73.03 PREDIABETES.: ICD-10-CM

## 2020-10-23 DIAGNOSIS — Z86.39 PERSONAL HISTORY OF OTHER ENDOCRINE, NUTRITIONAL AND METABOLIC DISEASE: ICD-10-CM

## 2020-10-23 DIAGNOSIS — Q24.5 MALFORMATION OF CORONARY VESSELS: ICD-10-CM

## 2020-10-23 DIAGNOSIS — Z00.00 ENCOUNTER FOR GENERAL ADULT MEDICAL EXAMINATION WITHOUT ABNORMAL FINDINGS: ICD-10-CM

## 2020-10-23 DIAGNOSIS — Z86.19 PERSONAL HISTORY OF OTHER INFECTIOUS AND PARASITIC DISEASES: ICD-10-CM

## 2020-10-23 DIAGNOSIS — Z92.29 PERSONAL HISTORY OF OTHER DRUG THERAPY: ICD-10-CM

## 2020-10-23 PROCEDURE — 80061 LIPID PANEL: CPT

## 2020-10-23 PROCEDURE — G0463: CPT

## 2020-10-23 PROCEDURE — 84443 ASSAY THYROID STIM HORMONE: CPT

## 2020-10-23 PROCEDURE — 80053 COMPREHEN METABOLIC PANEL: CPT

## 2020-10-23 PROCEDURE — 82043 UR ALBUMIN QUANTITATIVE: CPT

## 2020-10-23 PROCEDURE — 85025 COMPLETE CBC W/AUTO DIFF WBC: CPT

## 2020-10-23 PROCEDURE — 83036 HEMOGLOBIN GLYCOSYLATED A1C: CPT

## 2020-10-23 RX ORDER — SITAGLIPTIN 50 MG/1
50 TABLET, FILM COATED ORAL DAILY
Qty: 30 | Refills: 0 | Status: DISCONTINUED | COMMUNITY
Start: 2020-10-23 | End: 2020-10-23

## 2020-10-23 NOTE — END OF VISIT
[FreeTextEntry3] : Discussed with Dr Duncan; add Januvia 50 mg ; increase to 100 after renal functions; PMH Br cancer s/p Mastectomy and chemotherapy, Urine micro/alb, CMP, optometry ref for dilated eye exam, refuses foot exam, has f/u surgery for Rt Hip seroma/hematoma s/p fall, agree with plan of care unless specified.

## 2020-10-23 NOTE — ASSESSMENT
[FreeTextEntry1] : 61 y/o F with PMHx DM II, R breast cancer ( 2006) s/p chemotherapy and right mastectomy, panic attacks on escitalopram, GERD, H pylori infection came to the clinic for a check up.\par \par # Preventive measures \par - Immunizations up to date \par - Pap performed on Feb, 2019 WNL \par - COlonoscopy performed on 2010. Pt have an appt with GI on November \par - Last Mammogram on feb, 2020 WNL \par - CBC, TSH, lipid profile, CMP ordered \par \par # Diabetes Mellitus II \par - A1c performed today 7.3%, previously 6.9% \par - Continue on Metformin 1000 mg PO BID \par - Start Glipizide 2.5 mg PO daily and titrates as necessary. \par - Pt refused the foot exam multiple times \par - Opthalmology referral given. Pt last eye exam was on dec , 2019 \par - Pt report have not been compliant with diet  during the last months \par - Pt counseled to exercise at least 5 times weekly for 30 minutes \par - Pt counseled to continue a diet low in carbs and high in vegetables and fruits \par - If experience dizziness , sweating, shaking; drink or eat something sweet and contact your doctor.\par \par #Headache \par - Pt reports an episode of headache two days ago that resolved after ingestion of Tylenol \par - No headache at examination \par - Pt counseled  to a headache diary and to bring that to the next appt\par - Tylenol PRN \par \par #R hip hematoma\par - Pt had a mechanical fall some week ago \par - X ray/MRI R hip performed recently showed a hematoma vs seroma \par - Surgical referral given in the previous appt  for further management. Pt  have not schedule the appt yet. \par - Continue Tylenol and hot compresses as needed \par \par #GERD\par - Pt had a suspected gastric emptying study 3 weeks ago \par - She  will follow up with GI on November, 2020 \par - Continue Omeprazole \par \par RTC in 2 weeks for follow up of headache and CPE \par \par Case discussed with Dr Hanson

## 2020-10-23 NOTE — REVIEW OF SYSTEMS
[Headache] : headache [Negative] : Psychiatric [Joint Stiffness] : no joint stiffness [Joint Swelling] : no joint swelling [Muscle Weakness] : no muscle weakness [Muscle Pain] : no muscle pain [Back Pain] : no back pain [Dizziness] : no dizziness [Fainting] : no fainting [Confusion] : no confusion [Memory Loss] : no memory loss [Unsteady Walking] : no ataxia [FreeTextEntry9] : R hip pain

## 2020-10-23 NOTE — HISTORY OF PRESENT ILLNESS
[FreeTextEntry1] : Follow hip pain  [de-identified] : 59 y/o F with PMHx DM II, R breast cancer ( 2006) s/p chemotherapy and right mastectomy, panic attacks on escitalopram, GERD, H pylori infection came to the clinic for a check up. Pt last time seen on clinic was on 10/7/20 for evaluation of R hip pain that started after a  mechanical fall at home.  X ray hip / MRI performed at that moment showed  a possible  hematoma vs seroma  in the hip area and was referred to surgery for further evaluation. Pt is schedule to see the surgeon on November, 2020. Pt also reports hx of panic attack  since  1.5 year managed with escitalopram. She reports some weeks ago decreased the dose and frequency  of medication and stopped using it 1 week ago.She reports last episode of panic attack was one year ago. She is concern about an episode of headache that happened two days ago and thinks could be related to cessation of escitalopram.  She report headache was located in the occipital area and describes it  like pressure like, intensity 8/20 and relieved by Tylenol. She reports continue with mild R hip pain and chest wall pain upon palpation. She denies palpitation, fever, chill, blurry vision, upper extremities weakness, chest pain, SOB and any other symptoms. \par \par Pt also have a hx of DM II on metformin 1000 mg PO BID. She reports is complaint with medication and denies any adverse effect. She  states during the last months have not been complaint with diet and was eating candies.Like she gained weight and BGs was elevated she started to have a strict low carbohydrate diet one week ago with some improvement in her weight. She takes the BG at home and reports BGs readings are between 109-180 fasting. She denies polyphagia, polydipsia, nocturia, burry vision.

## 2020-10-23 NOTE — PHYSICAL EXAM
[Normal] : affect was normal and insight and judgment were intact [de-identified] : R hip  are with concentrated swelling and pain to palpation. No erythema  [de-identified] : Refused

## 2020-10-24 LAB
CHOLEST SERPL-MCNC: 167 MG/DL
HDLC SERPL-MCNC: 80 MG/DL
LDLC SERPL CALC-MCNC: 66 MG/DL
NONHDLC SERPL-MCNC: 87 MG/DL
TRIGL SERPL-MCNC: 103 MG/DL

## 2020-10-26 DIAGNOSIS — R51.9 HEADACHE, UNSPECIFIED: ICD-10-CM

## 2020-10-26 DIAGNOSIS — E11.9 TYPE 2 DIABETES MELLITUS WITHOUT COMPLICATIONS: ICD-10-CM

## 2020-10-26 DIAGNOSIS — F41.0 PANIC DISORDER [EPISODIC PAROXYSMAL ANXIETY]: ICD-10-CM

## 2020-10-26 DIAGNOSIS — I10 ESSENTIAL (PRIMARY) HYPERTENSION: ICD-10-CM

## 2020-10-26 DIAGNOSIS — T14.8XXA OTHER INJURY OF UNSPECIFIED BODY REGION, INITIAL ENCOUNTER: ICD-10-CM

## 2020-10-26 LAB
ALBUMIN SERPL ELPH-MCNC: 4.9 G/DL
ALP BLD-CCNC: 99 U/L
ALT SERPL-CCNC: 22 U/L
ANION GAP SERPL CALC-SCNC: 13 MMOL/L
AST SERPL-CCNC: 21 U/L
BASOPHILS # BLD AUTO: 0.02 K/UL
BASOPHILS NFR BLD AUTO: 0.3 %
BILIRUB SERPL-MCNC: 0.3 MG/DL
BUN SERPL-MCNC: 16 MG/DL
CALCIUM SERPL-MCNC: 9.9 MG/DL
CHLORIDE SERPL-SCNC: 103 MMOL/L
CO2 SERPL-SCNC: 27 MMOL/L
CREAT SERPL-MCNC: 0.62 MG/DL
CREAT SPEC-SCNC: 139 MG/DL
EOSINOPHIL # BLD AUTO: 0.24 K/UL
EOSINOPHIL NFR BLD AUTO: 3.8 %
ESTIMATED AVERAGE GLUCOSE: 171 MG/DL
GLUCOSE SERPL-MCNC: 127 MG/DL
HBA1C MFR BLD HPLC: 7.3
HBA1C MFR BLD HPLC: 7.6 %
HCT VFR BLD CALC: 42.9 %
HGB BLD-MCNC: 13.6 G/DL
IMM GRANULOCYTES NFR BLD AUTO: 0.2 %
LYMPHOCYTES # BLD AUTO: 2.14 K/UL
LYMPHOCYTES NFR BLD AUTO: 33.4 %
MAN DIFF?: NORMAL
MCHC RBC-ENTMCNC: 29.4 PG
MCHC RBC-ENTMCNC: 31.7 GM/DL
MCV RBC AUTO: 92.7 FL
MICROALBUMIN 24H UR DL<=1MG/L-MCNC: 2 MG/DL
MICROALBUMIN/CREAT 24H UR-RTO: 14 MG/G
MONOCYTES # BLD AUTO: 0.47 K/UL
MONOCYTES NFR BLD AUTO: 7.3 %
NEUTROPHILS # BLD AUTO: 3.52 K/UL
NEUTROPHILS NFR BLD AUTO: 55 %
PLATELET # BLD AUTO: 229 K/UL
POTASSIUM SERPL-SCNC: 4.2 MMOL/L
PROT SERPL-MCNC: 7.4 G/DL
RBC # BLD: 4.63 M/UL
RBC # FLD: 13.1 %
SODIUM SERPL-SCNC: 143 MMOL/L
TSH SERPL-ACNC: 2.11 UIU/ML
WBC # FLD AUTO: 6.4 K/UL

## 2020-10-28 ENCOUNTER — APPOINTMENT (OUTPATIENT)
Dept: SURGERY | Facility: HOSPITAL | Age: 60
End: 2020-10-28

## 2020-11-03 ENCOUNTER — APPOINTMENT (OUTPATIENT)
Dept: GASTROENTEROLOGY | Facility: HOSPITAL | Age: 60
End: 2020-11-03

## 2020-11-05 ENCOUNTER — APPOINTMENT (OUTPATIENT)
Dept: CARDIOLOGY | Facility: HOSPITAL | Age: 60
End: 2020-11-05

## 2020-11-05 ENCOUNTER — NON-APPOINTMENT (OUTPATIENT)
Age: 60
End: 2020-11-05

## 2020-11-05 ENCOUNTER — OUTPATIENT (OUTPATIENT)
Dept: OUTPATIENT SERVICES | Facility: HOSPITAL | Age: 60
LOS: 1 days | End: 2020-11-05
Payer: SELF-PAY

## 2020-11-05 VITALS
DIASTOLIC BLOOD PRESSURE: 83 MMHG | WEIGHT: 181 LBS | BODY MASS INDEX: 30.16 KG/M2 | OXYGEN SATURATION: 95 % | SYSTOLIC BLOOD PRESSURE: 119 MMHG | HEIGHT: 65 IN | HEART RATE: 94 BPM

## 2020-11-05 DIAGNOSIS — Z98.890 OTHER SPECIFIED POSTPROCEDURAL STATES: Chronic | ICD-10-CM

## 2020-11-05 DIAGNOSIS — I25.10 ATHEROSCLEROTIC HEART DISEASE OF NATIVE CORONARY ARTERY WITHOUT ANGINA PECTORIS: ICD-10-CM

## 2020-11-05 PROCEDURE — 93005 ELECTROCARDIOGRAM TRACING: CPT

## 2020-11-05 PROCEDURE — G0463: CPT

## 2020-11-05 NOTE — PHYSICAL EXAM
[General Appearance - Well Developed] : well developed [Normal Appearance] : normal appearance [Well Groomed] : well groomed [General Appearance - Well Nourished] : well nourished [No Deformities] : no deformities [General Appearance - In No Acute Distress] : no acute distress [Normal Jugular Venous A Waves Present] : normal jugular venous A waves present [Normal Jugular Venous V Waves Present] : normal jugular venous V waves present [No Jugular Venous Webb A Waves] : no jugular venous webb A waves [Respiration, Rhythm And Depth] : normal respiratory rhythm and effort [Exaggerated Use Of Accessory Muscles For Inspiration] : no accessory muscle use [Auscultation Breath Sounds / Voice Sounds] : lungs were clear to auscultation bilaterally [Heart Rate And Rhythm] : heart rate and rhythm were normal [Heart Sounds] : normal S1 and S2 [Murmurs] : no murmurs present [Abdomen Soft] : soft [Abdomen Tenderness] : non-tender [Abdomen Mass (___ Cm)] : no abdominal mass palpated [Nail Clubbing] : no clubbing of the fingernails [Cyanosis, Localized] : no localized cyanosis [Petechial Hemorrhages (___cm)] : no petechial hemorrhages [] : no ischemic changes

## 2020-11-06 ENCOUNTER — APPOINTMENT (OUTPATIENT)
Dept: FAMILY MEDICINE | Facility: HOSPITAL | Age: 60
End: 2020-11-06

## 2020-11-06 DIAGNOSIS — I31.3 PERICARDIAL EFFUSION (NONINFLAMMATORY): ICD-10-CM

## 2020-11-08 NOTE — HISTORY OF PRESENT ILLNESS
[FreeTextEntry1] : 60 year old woman with history of DM, obesity, R breast CA s/p mastectomy, chemo and tamoxifen and mLAD bridge seen incidentally on CT coronaries in 10/2015. Last seen in 9/2018.\par \par 2 years prior, she reported feeling bilateral arm pins and needles after taking new multivitamins. She presents today reporting that 5 days ago, she started a new brand of multivitamins then at night she felt bilateral arm "pins and needles" and few seconds of shortness of breath. It occurred twice this past Friday and Saturday and then never recurred again. \par \par She otherwise denies chest pain, exertional SOB or changes in her exercise tolerance. No orthopnea/PND or LE edema\par \par Studies:\par Holter 11/2015 - moderate PVCs (1.3%), bigeminy, trigeminy, rare APC's, HR . When reporting palpitations - SR with PVCs. \par \par TTE 2018: normal LV function, normal RV function, small organized pericardial effusion at apex, \par \par Cardiac CT 10/2015:\par Coronary Calcium Score = 0 Agatston Units\par Calculated left ventricular ejection fraction is about 69%.\par CT coronary angiography shows:\par LMCA: Normal.\par LAD: Patent. mLAD with bridging w/o narrowing. \par LCx: Patent.\par \par RCA: Dominant vessel, patent.\par \par Meds: metoprolol succinate 12.5 q12h, atorva 20, metformin\par \par Labs: 10/2020\par A1c 7.6, Cr and TSH wnl, LDL 60, HDL 80, , Trig 103\par \par

## 2020-11-08 NOTE — REASON FOR VISIT
[Follow-Up - Clinic] : a clinic follow-up of [FreeTextEntry2] : myocardial bridging, pericardial effusion

## 2020-11-08 NOTE — ASSESSMENT
[FreeTextEntry1] : 58 year old woman with history of DM, obesity, R breast CA s/p mastectomy/chemo/tamoxifen and mLAD bridge seen incidentally on CT coronaries in 10/2015 who presents with bilateral arm numbness and tingling with seconds of shortness of breath. She is euvolemic on exam and has no changes in her exercise tolerance.\par \par # Exposure to anti-neoplastic agent - Given her history of breast cancer and chemotherapy, checked TTE which revealed normal LV systolic and diastolic function, but also shows evidence of a pericardial effusion in 2018\par - repeat TTE to follow-up effusion\par \par # mLAD bridge - continue Toprol 12.5 q12h \par \par # DM\par - cont atorvastatin 20mg daily\par - cont metformin per PCP \par

## 2020-11-10 ENCOUNTER — APPOINTMENT (OUTPATIENT)
Dept: GASTROENTEROLOGY | Facility: HOSPITAL | Age: 60
End: 2020-11-10

## 2020-11-10 ENCOUNTER — OUTPATIENT (OUTPATIENT)
Dept: OUTPATIENT SERVICES | Facility: HOSPITAL | Age: 60
LOS: 1 days | End: 2020-11-10
Payer: SELF-PAY

## 2020-11-10 VITALS
HEART RATE: 87 BPM | HEIGHT: 65 IN | BODY MASS INDEX: 30.49 KG/M2 | TEMPERATURE: 96.5 F | SYSTOLIC BLOOD PRESSURE: 137 MMHG | DIASTOLIC BLOOD PRESSURE: 83 MMHG | WEIGHT: 183 LBS

## 2020-11-10 DIAGNOSIS — R19.6 HALITOSIS: ICD-10-CM

## 2020-11-10 DIAGNOSIS — Z98.890 OTHER SPECIFIED POSTPROCEDURAL STATES: Chronic | ICD-10-CM

## 2020-11-10 DIAGNOSIS — K21.9 GASTRO-ESOPHAGEAL REFLUX DISEASE W/OUT ESOPHAGITIS: ICD-10-CM

## 2020-11-10 DIAGNOSIS — R10.9 UNSPECIFIED ABDOMINAL PAIN: ICD-10-CM

## 2020-11-10 DIAGNOSIS — K21.9 GASTRO-ESOPHAGEAL REFLUX DISEASE WITHOUT ESOPHAGITIS: ICD-10-CM

## 2020-11-10 PROCEDURE — G0463: CPT

## 2020-11-10 NOTE — PHYSICAL EXAM
[General Appearance - Alert] : alert [General Appearance - In No Acute Distress] : in no acute distress [General Appearance - Well Nourished] : well nourished [Sclera] : the sclera and conjunctiva were normal [Extraocular Movements] : extraocular movements were intact [Auscultation Breath Sounds / Voice Sounds] : lungs were clear to auscultation bilaterally [Heart Rate And Rhythm] : heart rate was normal and rhythm regular [Heart Sounds] : normal S1 and S2 [Edema] : there was no peripheral edema [Bowel Sounds] : normal bowel sounds [Abdomen Soft] : soft [Abdomen Tenderness] : non-tender [Abdomen Mass (___ Cm)] : no abdominal mass palpated [Abnormal Walk] : normal gait [Nail Clubbing] : no clubbing  or cyanosis of the fingernails [Skin Color & Pigmentation] : normal skin color and pigmentation [] : no rash [No Focal Deficits] : no focal deficits [Oriented To Time, Place, And Person] : oriented to person, place, and time [Affect] : the affect was normal [Mood] : the mood was normal [FreeTextEntry1] : short neck

## 2020-11-10 NOTE — HISTORY OF PRESENT ILLNESS
[___ Month(s) Ago] : [unfilled] month(s) ago [Heartburn] : resolved heartburn [Nausea] : denies nausea [Vomiting] : denies vomiting [Diarrhea] : denies diarrhea [Constipation] : denies constipation [Yellow Skin Or Eyes (Jaundice)] : denies jaundice [Abdominal Pain] : denies abdominal pain [Abdominal Swelling] : denies abdominal swelling [Rectal Pain] : denies rectal pain [GERD] : gastroesophageal reflux disease [Hiatus Hernia] : no hiatus hernia [Cholelithiasis] : no cholelithiasis [Diverticulitis] : no diverticulitis [de-identified] : 60 year old female with history of GERD ( positive bravo) breast CA (dx 2006, s/p rt mastectomy, adjuvant chemotherapy, in remission), H.pylori gastritis, which was treated and eradicated (confirmed by stool antigen).HTN, DM here for follow up. \par \par She continues to c/o bad mouth odor throughout the day- worse in the morning. She has tried Nexium 20 mg bid and Omeprazole 40 mg daily without complete resolution of her symptoms.\par This started more than a year ago. She had visited the dentist and was told everything normal.\par \par On her last visit, she admits taking her meds 10 minutes before breakfast. Since her last visit she has been doing fine without heartburn, chest pain or any other complaints beside the bad odor in the mouth.\par She has been taking 20 mg/ once a day of omeprazole - one hour before breakfast since last visit.\par \par Gastric emptying study was done and came back normal.\par She denies any abdominal pain, nausea, vomiting, dysphagia, weight loss.\par \par WORKUP: \par Colonoscopy 5/2018\par -Non-bleeding internal hemorrhoid\par -Diverticulosis in the sigmoid colon\par -No specimens collected\par \par High-Resolution Esophageal Motility Study 5/2018\par -No manometric evidence of hiatal hernia\par -Normal relaxation of the LES\par -Normal swallowing study. \par \par EGD 5/2017\par Impression: - Normal esophagus.\par  - Z-line regular, 36 cm from the incisors.\par  - Small hiatus hernia.\par  - Gastritis. Biopsied. Suspicious for Portal Hypertensive Gastropathy.\par  - Normal duodenal bulb and 2nd part of the duodenum.\par  - The examination was otherwise normal.\par \par Path\par Final Diagnosis\par 1. Stomach, biopsy\par - Gastric antral mucosa with Helicobacter pylori-associated chronic active gastritis (Warthin-Starry stain).\par - Negative for intestinal metaplasia.\par 2. Stomach, body, biopsy\par - Gastric oxyntic mucosa with Helicobacter pylori-associated chronic active gastritis (Warthin-Starry stain).\par - Negative for intestinal metaplasia.\par \par Colonoscopy 12/2014\par Impression: - One 2 mm polyp in the cecum. Resected and retrieved.\par  - One 4 mm polyp in the ascending colon. Resected and retrieved.\par  - One 3 mm polyp at the hepatic flexure. Resected and retrieved.\par  - One 7 mm polyp in the sigmoid colon. Resected and retrieved. Clip was placed.\par  - Diverticulosis in the sigmoid colon and in the ascending colon.\par Path\par 1. Colon, cecum, polyp, biopsy\par - Colonic mucosa with no significant pathologic abnormality.\par 2. Colon, ascending, polyp, biopsy\par - Tubular adenoma(s).\par 3. Colon, hepatic flexure, polyp, biopsy\par - Colonic mucosa with lymphoid aggregate.\par 4. Colon, sigmoid, polyp, biopsy\par - Hyperplastic polyp.\par \par Colonoscopy 9/2011\par - Small hepatic flexure polyp.\par -Medium - sized internal hemorrhoids. \par

## 2020-11-10 NOTE — ASSESSMENT
[FreeTextEntry1] : 60 year old female with history of GERD(  positive bravo) breast CA (dx 2006, s/p rt mastectomy, adjuvant chemotherapy, in remission), HTN, DM here for surveillance colonoscopy.\par \par Impression:\par # Halitosis unclear etiology  - differential includes reflux, structural esophageal lesions such as diverticula\par # GERD controlled on PPI\par # Obesity BMI> 30\par # Abdominal bloating \par # Fatty liver on U/S 2015\par # History of tubular adenoma on colonoscopy 2014 and family history of colon cancer in mother.  Colonoscopy 5/2018 unremarkable.\par # H.pylori associated gastritis- s/p triple therapy\par \par Plan:\par - Obtain x-ray esophagram to rule out Zenker diverticulum \par - Counseled patient on lifestyle modification, weight loss, avoid heavy meals at night, avoiding acidic/spicy/stimulating food, moderate exercise 30 minutes / day for three times/ week\par - Continue PPI 20 mg daily (  1 hour before breakfast). Currently reflux is well controlled on this\par - Repeat colonoscopy in 5/2023 given family history of colon cancer and personal history of tubular adenoma.

## 2020-11-10 NOTE — REVIEW OF SYSTEMS
[As Noted in HPI] : as noted in HPI [Negative] : Heme/Lymph [Fever] : no fever [Chills] : no chills [Chest Pain] : no chest pain [Palpitations] : no palpitations [Shortness Of Breath] : no shortness of breath [SOB on Exertion] : no shortness of breath during exertion [Abdominal Pain] : no abdominal pain [Vomiting] : no vomiting [Constipation] : no constipation [Diarrhea] : no diarrhea [Heartburn] : no heartburn [Melena] : no melena [FreeTextEntry7] : positive for reflux

## 2020-11-14 ENCOUNTER — EMERGENCY (EMERGENCY)
Facility: HOSPITAL | Age: 60
LOS: 1 days | Discharge: HOME | End: 2020-11-14
Attending: EMERGENCY MEDICINE
Payer: MEDICAID

## 2020-11-14 VITALS
SYSTOLIC BLOOD PRESSURE: 152 MMHG | TEMPERATURE: 98 F | HEIGHT: 65 IN | OXYGEN SATURATION: 98 % | DIASTOLIC BLOOD PRESSURE: 99 MMHG | HEART RATE: 80 BPM | RESPIRATION RATE: 19 BRPM

## 2020-11-14 VITALS
DIASTOLIC BLOOD PRESSURE: 86 MMHG | OXYGEN SATURATION: 95 % | TEMPERATURE: 98 F | RESPIRATION RATE: 18 BRPM | SYSTOLIC BLOOD PRESSURE: 132 MMHG | HEART RATE: 72 BPM

## 2020-11-14 DIAGNOSIS — Z98.890 OTHER SPECIFIED POSTPROCEDURAL STATES: Chronic | ICD-10-CM

## 2020-11-14 LAB
ALBUMIN SERPL ELPH-MCNC: 5.2 G/DL — HIGH (ref 3.3–5)
ALP SERPL-CCNC: 90 U/L — SIGNIFICANT CHANGE UP (ref 40–120)
ALT FLD-CCNC: 23 U/L — SIGNIFICANT CHANGE UP (ref 10–45)
ANION GAP SERPL CALC-SCNC: 14 MMOL/L — SIGNIFICANT CHANGE UP (ref 5–17)
AST SERPL-CCNC: 24 U/L — SIGNIFICANT CHANGE UP (ref 10–40)
BASOPHILS # BLD AUTO: 0.02 K/UL — SIGNIFICANT CHANGE UP (ref 0–0.2)
BASOPHILS NFR BLD AUTO: 0.3 % — SIGNIFICANT CHANGE UP (ref 0–2)
BILIRUB SERPL-MCNC: 0.3 MG/DL — SIGNIFICANT CHANGE UP (ref 0.2–1.2)
BUN SERPL-MCNC: 12 MG/DL — SIGNIFICANT CHANGE UP (ref 7–23)
CALCIUM SERPL-MCNC: 10 MG/DL — SIGNIFICANT CHANGE UP (ref 8.4–10.5)
CHLORIDE SERPL-SCNC: 103 MMOL/L — SIGNIFICANT CHANGE UP (ref 96–108)
CO2 SERPL-SCNC: 23 MMOL/L — SIGNIFICANT CHANGE UP (ref 22–31)
CREAT SERPL-MCNC: 0.57 MG/DL — SIGNIFICANT CHANGE UP (ref 0.5–1.3)
EOSINOPHIL # BLD AUTO: 0.1 K/UL — SIGNIFICANT CHANGE UP (ref 0–0.5)
EOSINOPHIL NFR BLD AUTO: 1.5 % — SIGNIFICANT CHANGE UP (ref 0–6)
GLUCOSE SERPL-MCNC: 162 MG/DL — HIGH (ref 70–99)
HCT VFR BLD CALC: 44.7 % — SIGNIFICANT CHANGE UP (ref 34.5–45)
HGB BLD-MCNC: 14.3 G/DL — SIGNIFICANT CHANGE UP (ref 11.5–15.5)
IMM GRANULOCYTES NFR BLD AUTO: 0.3 % — SIGNIFICANT CHANGE UP (ref 0–1.5)
LYMPHOCYTES # BLD AUTO: 1.75 K/UL — SIGNIFICANT CHANGE UP (ref 1–3.3)
LYMPHOCYTES # BLD AUTO: 26.8 % — SIGNIFICANT CHANGE UP (ref 13–44)
MAGNESIUM SERPL-MCNC: 2 MG/DL — SIGNIFICANT CHANGE UP (ref 1.6–2.6)
MCHC RBC-ENTMCNC: 29.2 PG — SIGNIFICANT CHANGE UP (ref 27–34)
MCHC RBC-ENTMCNC: 32 GM/DL — SIGNIFICANT CHANGE UP (ref 32–36)
MCV RBC AUTO: 91.4 FL — SIGNIFICANT CHANGE UP (ref 80–100)
MONOCYTES # BLD AUTO: 0.48 K/UL — SIGNIFICANT CHANGE UP (ref 0–0.9)
MONOCYTES NFR BLD AUTO: 7.4 % — SIGNIFICANT CHANGE UP (ref 2–14)
NEUTROPHILS # BLD AUTO: 4.16 K/UL — SIGNIFICANT CHANGE UP (ref 1.8–7.4)
NEUTROPHILS NFR BLD AUTO: 63.7 % — SIGNIFICANT CHANGE UP (ref 43–77)
NRBC # BLD: 0 /100 WBCS — SIGNIFICANT CHANGE UP (ref 0–0)
NT-PROBNP SERPL-SCNC: 41 PG/ML — SIGNIFICANT CHANGE UP (ref 0–300)
PHOSPHATE SERPL-MCNC: 3 MG/DL — SIGNIFICANT CHANGE UP (ref 2.5–4.5)
PLATELET # BLD AUTO: 266 K/UL — SIGNIFICANT CHANGE UP (ref 150–400)
POTASSIUM SERPL-MCNC: 3.6 MMOL/L — SIGNIFICANT CHANGE UP (ref 3.5–5.3)
POTASSIUM SERPL-SCNC: 3.6 MMOL/L — SIGNIFICANT CHANGE UP (ref 3.5–5.3)
PROT SERPL-MCNC: 8.2 G/DL — SIGNIFICANT CHANGE UP (ref 6–8.3)
RBC # BLD: 4.89 M/UL — SIGNIFICANT CHANGE UP (ref 3.8–5.2)
RBC # FLD: 13 % — SIGNIFICANT CHANGE UP (ref 10.3–14.5)
SODIUM SERPL-SCNC: 140 MMOL/L — SIGNIFICANT CHANGE UP (ref 135–145)
TROPONIN T, HIGH SENSITIVITY RESULT: 10 NG/L — SIGNIFICANT CHANGE UP (ref 0–51)
WBC # BLD: 6.53 K/UL — SIGNIFICANT CHANGE UP (ref 3.8–10.5)
WBC # FLD AUTO: 6.53 K/UL — SIGNIFICANT CHANGE UP (ref 3.8–10.5)

## 2020-11-14 PROCEDURE — 71046 X-RAY EXAM CHEST 2 VIEWS: CPT | Mod: 26

## 2020-11-14 PROCEDURE — 84100 ASSAY OF PHOSPHORUS: CPT

## 2020-11-14 PROCEDURE — 71046 X-RAY EXAM CHEST 2 VIEWS: CPT

## 2020-11-14 PROCEDURE — 93010 ELECTROCARDIOGRAM REPORT: CPT

## 2020-11-14 PROCEDURE — 83880 ASSAY OF NATRIURETIC PEPTIDE: CPT

## 2020-11-14 PROCEDURE — 99053 MED SERV 10PM-8AM 24 HR FAC: CPT

## 2020-11-14 PROCEDURE — 80053 COMPREHEN METABOLIC PANEL: CPT

## 2020-11-14 PROCEDURE — 85025 COMPLETE CBC W/AUTO DIFF WBC: CPT

## 2020-11-14 PROCEDURE — 84484 ASSAY OF TROPONIN QUANT: CPT

## 2020-11-14 PROCEDURE — 99285 EMERGENCY DEPT VISIT HI MDM: CPT

## 2020-11-14 PROCEDURE — 99284 EMERGENCY DEPT VISIT MOD MDM: CPT | Mod: 25

## 2020-11-14 PROCEDURE — 83735 ASSAY OF MAGNESIUM: CPT

## 2020-11-14 PROCEDURE — 93005 ELECTROCARDIOGRAM TRACING: CPT

## 2020-11-14 NOTE — ED PROVIDER NOTE - OBJECTIVE STATEMENT
61 yo F, pmhx of HTN, HLD, DM, 59 yo F, pmhx of HTN, HLD, DM, CAD pw transient episode of elevated bp and arm numbness. At 3AM, pt was having trouble falling asleep because felt b/l arm numbness, and found her SBP to be 220. Pt also endorse feeling SOB with exertion for the past few days, but denies b/l LE edema, CP, nausea, vomiting diaphoresis. In the ED, pt bp 150's without intervention, Ucs302%+ on RA and speaks in full sentences. 61 yo F, pmhx of HTN, HLD, DM, CAD pw transient episode of elevated bp and b/l arm paresthesias. At 3AM, pt was having trouble falling asleep because felt b/l arm numbness, and found her SBP to be 220. Pt also endorse feeling SOB with exertion for the past few days and has been following up with her cardiologist for this, but denies b/l LE edema, CP, nausea, vomiting diaphoresis. In the ED, pt bp 150's without intervention, Xsd855%+ on RA and speaks in full sentences.

## 2020-11-14 NOTE — ED ADULT NURSE NOTE - CADM POA PRESS ULCER
- We will check x-rays of your spine and chest  - Use alprazolam (anxiety medicine) every 6 hours as needed  - Follow up with your chiropractor, Dr. Cindi Polanco.     - Call or go to ED with worsening headaches, constant issues with your vision, nausea/vomitin
No

## 2020-11-14 NOTE — ED PROVIDER NOTE - ATTENDING CONTRIBUTION TO CARE
MD Gallardo:  patient seen and evaluated personally.   I agree with the History & Physical,  Impression & Plan other than what was detailed in my note.  MD Gallardo  61 y/o f presnting to ed w/ cc of b/l arm "tingling". This started several hours previous to arrival to ED. Pt was concerned bc she checked her bp and it was noted to be 220's. Took no meds for this. She did not have any associated sob, cp, n/v, diaphoresis, arm/neck/back pain. On ros pt states that she has also had intermittent sob ove rpast few days. No cough/wheezing, pleuritic pain, hemoptysis. afebrile bp 160's without intervention. Exam unremarkable. Lungs cta b/l, no w/r/r, heart sounds normal, no m/r/g, abd soft nt, neg obregon/homans sign. Pt has atypical symptoms for acs but given risk factor will get acs workup. Pt does not need any acute bp interventions, discussed this could be more dangerous then helpful. Unlikely emergent cause of paresthesias but will keep on monitor, check electrolytes, troponin.

## 2020-11-14 NOTE — ED PROVIDER NOTE - PHYSICAL EXAMINATION
GENl: Patient awake alert NAD.   HEENT: normocephalic, atraumatic, EOMI, no scleral icterus, moist MM  CARDIAC: RRR, S1, S2, no murmur.   PULM: CTA B/L no wheeze, rhonchi, rales, speaks in full sentences, spo2 > 96% on RA.   ABD: soft NT, ND, no rebound no guarding  MSK: Moving all extremities, no pitting edema.   NEURO: A&Ox3, gait normal, no focal neurological deficits, CN 2-12 grossly intact  SKIN: warm, dry, no rash.  PSYCH: anxious affect, intermittently tearful.

## 2020-11-14 NOTE — ED PROVIDER NOTE - PROGRESS NOTE DETAILS
Attending Masom:  pt asymptomatic , bp 136/85. workup neg, trop 10, discussed rtrted. Miranda Fowler M.D. Resident   negative workup discussed with patient. Pt agrees to follow up with PMD and cardiologist. All questions answered, pt agreeable to DC.

## 2020-11-14 NOTE — ED ADULT NURSE NOTE - OBJECTIVE STATEMENT
61 y/o female with a history of HTN, DM2 and hypercholesterolemia presents to the ED from home c/o high blood pressure, weakness and SOB x 1 week. Patient states she monitors her BP at home and noted her SBP was at 220. Denies HA, vision changes, fever, chills, n/v, abd pain, diarrhea/constipation, numbness/tingling, urinary s/s. Patient A&Ox3, in no respiratory distress. Face symmetric, speech clear and intact. Muscle strength equal in upper and lower extremities. Sensation intact. Peripheral pulses strong in upper and lower extremities. Skin warm, dry and intact. Patient safety provided, call bell within reach and bed in the lowest position.

## 2020-11-14 NOTE — ED PROVIDER NOTE - NSFOLLOWUPINSTRUCTIONS_ED_ALL_ED_FT
You were seen in the Emergency Department (ED) for high blood pressure and arm numbness. You were not found to have a life-threatening condition.     Continue to take your medications as prescribed.     Please follow up with your primary care doctor and cardiologist in the next 72 hours. If you have issues obtaining follow up, please call: [phone] to obtain a doctor or specialist who takes your insurance in your area.    Please return to the ED if you experience any new or concerning symptoms, such as: chest pain, difficulty breathing, passing out, unable to eat or drink, unable to move or feel part of your body, fever, chills.     Thank you for visiting a [facility] ED.

## 2020-11-14 NOTE — ED ADULT NURSE NOTE - CHPI ED NUR SYMPTOMS NEG
no vomiting/no dizziness/no pain/no tingling/no chills/no fever/no decreased eating/drinking/no nausea

## 2020-11-14 NOTE — ED PROVIDER NOTE - PATIENT PORTAL LINK FT
You can access the FollowMyHealth Patient Portal offered by [facility] by registering at the following website: [email or web address]. By joining [employer]’s FollowMyHealth portal, you will also be able to view your health information using other applications (apps) compatible with our system.

## 2020-11-14 NOTE — ED PROVIDER NOTE - CLINICAL SUMMARY MEDICAL DECISION MAKING FREE TEXT BOX
59 yo F, pmhx of HTN, HLD, DM, CAD pw transient episode of elevated bp and arm numbness, and several days of SOB. Pt not noted to be in respiratory distress on exam, low suspicion for cardiac/CHF etiology of SOB given no LE edema or rales. Possible electrolyte disturbance or hyperglycemia given hx of DM. Check labs, CXR, trop, pro BNP, electrolytes. Likely DC home with PMD followup.

## 2020-11-15 ENCOUNTER — NON-APPOINTMENT (OUTPATIENT)
Age: 60
End: 2020-11-15

## 2020-11-16 ENCOUNTER — APPOINTMENT (OUTPATIENT)
Dept: FAMILY MEDICINE | Facility: HOSPITAL | Age: 60
End: 2020-11-16

## 2020-11-16 ENCOUNTER — APPOINTMENT (OUTPATIENT)
Dept: CV DIAGNOSITCS | Facility: HOSPITAL | Age: 60
End: 2020-11-16

## 2020-11-16 ENCOUNTER — OUTPATIENT (OUTPATIENT)
Dept: OUTPATIENT SERVICES | Facility: HOSPITAL | Age: 60
LOS: 1 days | End: 2020-11-16
Payer: SELF-PAY

## 2020-11-16 ENCOUNTER — RESULT CHARGE (OUTPATIENT)
Age: 60
End: 2020-11-16

## 2020-11-16 VITALS
DIASTOLIC BLOOD PRESSURE: 81 MMHG | TEMPERATURE: 96.8 F | RESPIRATION RATE: 14 BRPM | SYSTOLIC BLOOD PRESSURE: 121 MMHG | WEIGHT: 180 LBS | HEART RATE: 81 BPM | OXYGEN SATURATION: 95 % | BODY MASS INDEX: 29.95 KG/M2

## 2020-11-16 DIAGNOSIS — Z00.00 ENCOUNTER FOR GENERAL ADULT MEDICAL EXAMINATION WITHOUT ABNORMAL FINDINGS: ICD-10-CM

## 2020-11-16 DIAGNOSIS — Z98.890 OTHER SPECIFIED POSTPROCEDURAL STATES: Chronic | ICD-10-CM

## 2020-11-16 DIAGNOSIS — I10 ESSENTIAL (PRIMARY) HYPERTENSION: ICD-10-CM

## 2020-11-16 LAB — HBA1C MFR BLD HPLC: 7

## 2020-11-16 PROCEDURE — 93306 TTE W/DOPPLER COMPLETE: CPT | Mod: 26

## 2020-11-16 PROCEDURE — 93306 TTE W/DOPPLER COMPLETE: CPT

## 2020-11-17 DIAGNOSIS — E66.9 OBESITY, UNSPECIFIED: ICD-10-CM

## 2020-11-17 DIAGNOSIS — F41.8 OTHER SPECIFIED ANXIETY DISORDERS: ICD-10-CM

## 2020-11-18 PROCEDURE — G0463: CPT

## 2020-11-18 PROCEDURE — 80053 COMPREHEN METABOLIC PANEL: CPT

## 2020-11-18 PROCEDURE — 82306 VITAMIN D 25 HYDROXY: CPT

## 2020-11-18 PROCEDURE — 85025 COMPLETE CBC W/AUTO DIFF WBC: CPT

## 2020-11-18 PROCEDURE — 80061 LIPID PANEL: CPT

## 2020-11-19 ENCOUNTER — NON-APPOINTMENT (OUTPATIENT)
Age: 60
End: 2020-11-19

## 2020-11-20 ENCOUNTER — NON-APPOINTMENT (OUTPATIENT)
Age: 60
End: 2020-11-20

## 2020-11-22 LAB
25(OH)D3 SERPL-MCNC: 31.1 NG/ML
ALBUMIN SERPL ELPH-MCNC: 4.8 G/DL
ALP BLD-CCNC: 93 U/L
ALT SERPL-CCNC: 18 U/L
ANION GAP SERPL CALC-SCNC: 12 MMOL/L
AST SERPL-CCNC: 20 U/L
BASOPHILS # BLD AUTO: 0.02 K/UL
BASOPHILS NFR BLD AUTO: 0.4 %
BILIRUB SERPL-MCNC: 0.4 MG/DL
BUN SERPL-MCNC: 16 MG/DL
CALCIUM SERPL-MCNC: 9.6 MG/DL
CHLORIDE SERPL-SCNC: 107 MMOL/L
CHOLEST SERPL-MCNC: 155 MG/DL
CO2 SERPL-SCNC: 25 MMOL/L
CREAT SERPL-MCNC: 0.63 MG/DL
EOSINOPHIL # BLD AUTO: 0.05 K/UL
EOSINOPHIL NFR BLD AUTO: 1.1 %
GLUCOSE SERPL-MCNC: 141 MG/DL
HCT VFR BLD CALC: 42.1 %
HDLC SERPL-MCNC: 66 MG/DL
HGB BLD-MCNC: 13.4 G/DL
IMM GRANULOCYTES NFR BLD AUTO: 0.2 %
LDLC SERPL CALC-MCNC: 72 MG/DL
LYMPHOCYTES # BLD AUTO: 1.72 K/UL
LYMPHOCYTES NFR BLD AUTO: 37 %
MAN DIFF?: NORMAL
MCHC RBC-ENTMCNC: 29.2 PG
MCHC RBC-ENTMCNC: 31.8 GM/DL
MCV RBC AUTO: 91.7 FL
MONOCYTES # BLD AUTO: 0.38 K/UL
MONOCYTES NFR BLD AUTO: 8.2 %
NEUTROPHILS # BLD AUTO: 2.47 K/UL
NEUTROPHILS NFR BLD AUTO: 53.1 %
NONHDLC SERPL-MCNC: 89 MG/DL
PLATELET # BLD AUTO: 247 K/UL
POTASSIUM SERPL-SCNC: 4.4 MMOL/L
PROT SERPL-MCNC: 7.2 G/DL
RBC # BLD: 4.59 M/UL
RBC # FLD: 13.2 %
SODIUM SERPL-SCNC: 144 MMOL/L
TRIGL SERPL-MCNC: 85 MG/DL
WBC # FLD AUTO: 4.65 K/UL

## 2020-11-23 NOTE — ASSESSMENT
[FreeTextEntry1] : 60F with a PMH significant for HTN, HLD, T2DM, Panic Dp,  Breast CA in remission and LAD Myocardial Bridge recently evaluated at [facility] for HTNsive emergency with pressures self resolving from SBP of 220mmHg to 136mmHg with remaining Cardiac workup return negative making the Dx of a Panic Attack to be most likely.

## 2020-11-23 NOTE — REVIEW OF SYSTEMS
[Recent Change In Weight] : ~T recent weight change [Insomnia] : insomnia [Anxiety] : anxiety [Depression] : depression [Negative] : Neurological [Suicidal] : not suicidal

## 2020-11-23 NOTE — PLAN
[FreeTextEntry1] : \par \par #Panic Disorder/Panic Attack \par #Depression \par -Formerly managed on Escitalopram; self tapered/dc'd last month when found w/o Panick Attacks for over 1.5y\par -Refer to Psych for further evaluation and medication management, referral given today \par -Consider SW consult as well \par -Reinstate SSRI: Escitalopram 10mg- taking only 1/2 tab qD\par -PHQ-9: 8\par -Denies SI/HI\par \par \par #Elevated BP readings  \par -Likely in the setting of an Acute Panic Attack; -220 on ED Evaluation \par -Elevated BPs self resolved without pharmacologic intervention \par -BP today:121/81 mmHg\par -Will reinstate SSRI, pt advised to continue to monitor \par -No Prescribed Antihypertensives recommended at this time \par \par #LAD Myocardial Brdige \par -Encourage continual f/u with Cardiology \par -Cardio: 11/05/20: After ED eval for IVERSON/SOB, recommend repeat TTE \par -TTE 2018: normal LV function, normal RV function, small organized pericardial effusion at apex, \par -Continue with the use of Metoprolol Succ 25mg- 1/2 tab BID\par -Consider the addition of Nom dH-CCB\par -AVOID the use of Nitrates as it is contraindicated in this condition \par -F/u Fasting Lipid panel?, per pts request \par -Former Fasting Lipid Panel performed 10/23/20 within normal limits \par -Continue with low intensity statin, despite the above findings\par -Statin recommended by cardio, in light of LAD Bridge, DM \par \par #T2DM\par -POCT A1C 11/20: 7.0%  (From 7.6% on 10/20) \par -Continue with Metformin 1000mg BID \par -Continue with Glipizide 5mg only 1/2 tabqD \par -Micro/Alb-Cr 10/28/20: Within Normal Limits \par -RTC in 2weeks for DM follow up \par -Continue with low intensity Statin as recommended by cardio, in light of LAD Bridge, DM \par -Pt strongly advised to monitor Carbohydrate intake \par -Diet and Exercise strongly encouraged \par -Pt expressed under standing to the above\par \par \par \par #Obesity \par -Lossed 3lbs in the last 1 week \par -Wt Loss Goal: 2lb per week \par -Monitor calorey intake, <2,000calories daily \par -Diet and Exercise encouraged\par \par \par #History of H.Pylori Gastritis \par #History of GERD\par -H.Pylori IgA AB Positive on 06/24/ 2020\par -S/p Confirmation of Eradication by GI with stool Antigen \par -EGD: H.pylori gastritis, negative for intestinal metaplasia \par \par \par #History of Tubular Adenoma\par -Positive Family History of Colon CA\par -Colonoscopy 05/2018: Sigmoid Diverticulosis with internal hemorrhoids \par -Repeat Colonoscopy in 2023\par -Pathology from 2014 Colonoscopy: Tubular Adenoma \par -Pt currently following GI\par \par #Breast CA currently in Remission \par -First Dx in 2003\par -Currently Stable \par -S/p Right Mastectomy and adjuvent Chemo \par \par \par #COVID-19 Precautions \par -Practice Social Distancing \par -Practice hand hygiene \par -Continue with surgical Mask PPE \par -Contact the clinic with Sxs concerning for COVID \par \par #Health Care Maintenance \par -PAP/HPV 02/2019: Negative \par -Colonoscopy 05/2018: Sigmoid Diverticulosis with internal hemorrhoids \par -POCT A1C performed today per pt request, last performed 10/23/20.\par -Schedule an appointment for annual CPE \par -F/u the following labs: CMP, TSH w/ reflex T4, Lipid Panel \par -Flu shot administered 09/12/20\par \par \par \par #Future \par -RTC in 2 weeks for DM f/u + CPE as well as re-evaluation of sxs to the above regimen\par \par \par \par Case discussed with Dr. FERNANDEZ\par

## 2020-11-23 NOTE — HISTORY OF PRESENT ILLNESS
[FreeTextEntry1] : F/U ED visit  [de-identified] : 60F with a PMH significant for HTN, HLD, T2DM, Panic Do, Breast CA in remission and LAD Myocardial Bridge, last seen in clinic on 10/23/20 for follow up of her Right Hip Injury sustained after a mechanical fall, where XR/MRI revealed findings suggestive of a hematoma/seroma, for which she was refered to surgery; at that time she also admitted to discontinuing Escitalopram after a self managed taper. She returns to clinic today s/p an ED visit for IVERSON, Bilateral UE paresthesias and elevated BP (SBP range: 188-220mmHg). Cardiac work up performed at that time, resulted in negative findings, ACS was ruled out and she was subsequently discharged home with stable pressures, 136/85mmHg, and asked to follow up with her PCP. Today she reports resolution of her sxs and states that she has been experiencing sxs of sadness with lack of motivation. She denies SI/HI and dates the onset of her depressive sxs as a month ago when she discontinued her SSRI. She found her self eating more often to appease her feelings of sadness. During the interview the pt became tearful and expressed her concerns over these acute feelings/sxs. On further ROS She denies Chest Pain, SOB/IVERSON, abdominal pain, N/V/D, Fevers/Chills, HA or blurred vision with remaining ROS unremarkable.\par

## 2020-11-25 ENCOUNTER — APPOINTMENT (OUTPATIENT)
Dept: SURGERY | Facility: HOSPITAL | Age: 60
End: 2020-11-25

## 2020-11-25 ENCOUNTER — OUTPATIENT (OUTPATIENT)
Dept: OUTPATIENT SERVICES | Facility: HOSPITAL | Age: 60
LOS: 1 days | End: 2020-11-25
Payer: SELF-PAY

## 2020-11-25 VITALS
DIASTOLIC BLOOD PRESSURE: 76 MMHG | OXYGEN SATURATION: 97 % | TEMPERATURE: 97.5 F | WEIGHT: 176 LBS | HEART RATE: 87 BPM | RESPIRATION RATE: 14 BRPM | SYSTOLIC BLOOD PRESSURE: 122 MMHG | BODY MASS INDEX: 29.29 KG/M2

## 2020-11-25 DIAGNOSIS — Z00.00 ENCOUNTER FOR GENERAL ADULT MEDICAL EXAMINATION WITHOUT ABNORMAL FINDINGS: ICD-10-CM

## 2020-11-25 DIAGNOSIS — Z98.890 OTHER SPECIFIED POSTPROCEDURAL STATES: Chronic | ICD-10-CM

## 2020-11-25 PROCEDURE — G0463: CPT

## 2020-11-25 RX ORDER — GLIPIZIDE 5 MG/1
5 TABLET ORAL DAILY
Qty: 15 | Refills: 0 | Status: DISCONTINUED | COMMUNITY
Start: 2020-10-23 | End: 2020-11-25

## 2020-11-30 ENCOUNTER — OUTPATIENT (OUTPATIENT)
Dept: OUTPATIENT SERVICES | Facility: HOSPITAL | Age: 60
LOS: 1 days | End: 2020-11-30
Payer: SELF-PAY

## 2020-11-30 ENCOUNTER — RESULT REVIEW (OUTPATIENT)
Age: 60
End: 2020-11-30

## 2020-11-30 ENCOUNTER — APPOINTMENT (OUTPATIENT)
Dept: RADIOLOGY | Facility: HOSPITAL | Age: 60
End: 2020-11-30

## 2020-11-30 DIAGNOSIS — K21.9 GASTRO-ESOPHAGEAL REFLUX DISEASE WITHOUT ESOPHAGITIS: ICD-10-CM

## 2020-11-30 DIAGNOSIS — R19.6 HALITOSIS: ICD-10-CM

## 2020-11-30 DIAGNOSIS — Z98.890 OTHER SPECIFIED POSTPROCEDURAL STATES: Chronic | ICD-10-CM

## 2020-11-30 PROCEDURE — 74220 X-RAY XM ESOPHAGUS 1CNTRST: CPT

## 2020-11-30 PROCEDURE — 74220 X-RAY XM ESOPHAGUS 1CNTRST: CPT | Mod: 26

## 2020-12-01 ENCOUNTER — NON-APPOINTMENT (OUTPATIENT)
Age: 60
End: 2020-12-01

## 2020-12-01 DIAGNOSIS — T14.8XXA OTHER INJURY OF UNSPECIFIED BODY REGION, INITIAL ENCOUNTER: ICD-10-CM

## 2020-12-12 ENCOUNTER — OUTPATIENT (OUTPATIENT)
Dept: OUTPATIENT SERVICES | Facility: HOSPITAL | Age: 60
LOS: 1 days | End: 2020-12-12
Payer: SELF-PAY

## 2020-12-12 ENCOUNTER — APPOINTMENT (OUTPATIENT)
Dept: FAMILY MEDICINE | Facility: HOSPITAL | Age: 60
End: 2020-12-12

## 2020-12-12 VITALS
TEMPERATURE: 96.8 F | DIASTOLIC BLOOD PRESSURE: 86 MMHG | HEIGHT: 65 IN | SYSTOLIC BLOOD PRESSURE: 138 MMHG | RESPIRATION RATE: 16 BRPM | BODY MASS INDEX: 29.66 KG/M2 | OXYGEN SATURATION: 96 % | HEART RATE: 94 BPM | WEIGHT: 178 LBS

## 2020-12-12 DIAGNOSIS — Z98.890 OTHER SPECIFIED POSTPROCEDURAL STATES: Chronic | ICD-10-CM

## 2020-12-12 DIAGNOSIS — Z00.00 ENCOUNTER FOR GENERAL ADULT MEDICAL EXAMINATION WITHOUT ABNORMAL FINDINGS: ICD-10-CM

## 2020-12-12 DIAGNOSIS — R19.6 HALITOSIS: ICD-10-CM

## 2020-12-12 DIAGNOSIS — Z86.79 PERSONAL HISTORY OF OTHER DISEASES OF THE CIRCULATORY SYSTEM: ICD-10-CM

## 2020-12-12 DIAGNOSIS — D36.9 BENIGN NEOPLASM, UNSPECIFIED SITE: ICD-10-CM

## 2020-12-12 DIAGNOSIS — T14.8XXA OTHER INJURY OF UNSPECIFIED BODY REGION, INITIAL ENCOUNTER: ICD-10-CM

## 2020-12-12 DIAGNOSIS — Z87.2 PERSONAL HISTORY OF DISEASES OF THE SKIN AND SUBCUTANEOUS TISSUE: ICD-10-CM

## 2020-12-12 DIAGNOSIS — Z86.39 PERSONAL HISTORY OF OTHER ENDOCRINE, NUTRITIONAL AND METABOLIC DISEASE: ICD-10-CM

## 2020-12-12 DIAGNOSIS — Z86.69 PERSONAL HISTORY OF OTHER DISEASES OF THE NERVOUS SYSTEM AND SENSE ORGANS: ICD-10-CM

## 2020-12-12 DIAGNOSIS — Z85.3 PERSONAL HISTORY OF MALIGNANT NEOPLASM OF BREAST: ICD-10-CM

## 2020-12-12 DIAGNOSIS — Z91.81 HISTORY OF FALLING: ICD-10-CM

## 2020-12-12 DIAGNOSIS — E66.2 MORBID (SEVERE) OBESITY WITH ALVEOLAR HYPOVENTILATION: ICD-10-CM

## 2020-12-12 DIAGNOSIS — Z83.79 FAMILY HISTORY OF OTHER DISEASES OF THE DIGESTIVE SYSTEM: ICD-10-CM

## 2020-12-12 DIAGNOSIS — K64.9 UNSPECIFIED HEMORRHOIDS: ICD-10-CM

## 2020-12-12 DIAGNOSIS — D12.6 BENIGN NEOPLASM OF COLON, UNSPECIFIED: ICD-10-CM

## 2020-12-12 DIAGNOSIS — Z87.898 PERSONAL HISTORY OF OTHER SPECIFIED CONDITIONS: ICD-10-CM

## 2020-12-12 DIAGNOSIS — M12.811 OTHER SPECIFIC ARTHROPATHIES, NOT ELSEWHERE CLASSIFIED, RIGHT SHOULDER: ICD-10-CM

## 2020-12-12 PROCEDURE — G0463: CPT

## 2020-12-12 RX ORDER — HYDROCORTISONE ACETATE 25 MG/1
25 SUPPOSITORY RECTAL
Qty: 7 | Refills: 0 | Status: DISCONTINUED | COMMUNITY
Start: 2020-11-26 | End: 2020-12-12

## 2020-12-13 NOTE — HISTORY OF PRESENT ILLNESS
[FreeTextEntry1] : follow labs results/ hemorrhoids  [de-identified] : 61 y/o F with PMHx DM II, R breast cancer ( 2003) s/p chemotherapy and right mastectomy, panic attacks on escitalopram, GERD, H pylori infection, LAD myocardial bridge on metoprolol came to the clinic for labs results. Pt last time seen on clinic was on 11/16/30 s/p ED visit due to paresthesias, elevated BP, SOB , Ceballos and chest pain mostly secondary to a panic attack. Pt was on escitalopram for more than 1.5 year for management of panic attack and depression and self titrated down completely one month previous to the event. Pt was reinstate on escitalopram 2.5 mg PO daily and reports is feeling better. She is also following with  and reports is helpful. She denies any recent episode of panic attacks, anhedonia, insomnia,difficulty concentrating, suicidal thinking or plan. She also followed with surgery for  evaluation of right hip hematoma s/p a fall  that resolved and didn't required further intervention. She also reports followed with GI and had a recent gastric emptying that was normal. \par \par Pt also have a hx of DM II on Metformin 1000 mg PO BID. She reports is complaint with medications and denies any adverse effect. She   reports was following a diet and loss approximately 5 pounds but during the last week have not been complaint with diet and was eating  food high in carbohydrates.She takes the BG at home and reports BGs readings are between 106-160 fasting. She denies polyphagia, polydipsia, nocturia, burry vision. \par \par \par

## 2020-12-13 NOTE — ASSESSMENT
[FreeTextEntry1] : 59 y/o F with PMHx DM II, R breast cancer ( 2003) s/p chemotherapy and right mastectomy, panic attacks on escitalopram, GERD, H pylori infection, LAD myocardial bridge on metoprolol came to the clinic for labs results and f/u panic attacks. \par \par #Panic attack disorder with concomitant depression\par -Pt had a recent episode of panic attack on 11/14/20 after self titrated down escitalopram one moth previous to the event.  \par -Pt was reinstate on Escitalopram in the previous appt\par - Pt reports is taking Escitalopram 2.5 mg PO daily. At chart review pt supposed to be in 5 mg but she reports doesn't tolerate that dose and was instructed in the past to continue with 2.5 mg PO daily \par - Continue in Escitalopram 2.5 mg PO daily. We will monitor efficacy and consider increase dose. \par - Pt reports is following with Ms Casillas and reports is helpful\par - She has been feeling better and denies any new episode of panic attacks\par -Denies SI/HI\par \par # Diabetes Mellitus II \par - A1c on 11/17/30 - 7%\par - Continue on Metformin 1000 mg PO BID \par - Monofilament test WNL  \par - Opthalmology referral given in previous appt. Pt have and appt on Feb, 2021  \par - Pt counseled to exercise at least 5 times weekly for 30 minutes \par - Pt counseled to continue a diet low in carbs and high in vegetables and fruits \par \par #Hemorrhoids  \par -Pt refused examination and reports hemorrhoids improved after treatment with steroid suppositories x 7 days \par - Pt advised to increased fiber in her diet \par \par #R hip hematoma\par - s/p mechanical fall \par - X ray/MRI R hip performed recently showed a hematoma vs seroma \par - Resolved w/o surgical intervention \par - Surgery recommendations appreciated \par \par #GERD/ Halitosis \par - Pt have a recent gastric emptying study that was normal \par - Pt had a recent Xray esophagram to rule out Zinker diverticulum and it was normal \par - Last EGD on 5/17 showed small hiatus hernia ans suspicious portal hypertensive gastropathy \par - Continue Omeprazole 20 mg PO daily \par \par #History of H.Pylori Gastritis \par -H.Pylori IgA AB Positive on 06/24/ 2020\par -S/p Confirmation of Eradication by GI with stool Antigen \par \par #LAD Myocardial bridge \par -TTE performed on 11/16/20 showed LVEF 60-65%, normal systolic function \par -Continue Metoprolol Succ 12.5 mg PO BID  \par -Continue Atorvastatin 20 mg PO daily \par -Continue f/u with Cardiology \par \par #History of Tubular Adenoma\par - Dx 2014 \par -Repeated colonoscopy on 05/2018 showed sigmoid diverticulosis with internal hemorrhoids  and no polyps \par -Positive Family History of Colon CA \par -Repeat Colonoscopy in 2023\par -Continue GI follow up \par \par # Preventive measures \par - Immunizations up to date \par - Pap performed on Feb, 2019 WNL \par - Colonoscopy performed on 2018 showed sigmoid diverticulosis and internal hemorrhoids. Repeat in 2023  \par - Last Mammogram on feb, 2020 WNL \par - Labs reviewed and discussed with patient \par \par RTC in 2 weeks for CPE \par \par Case discussed with Dr Klein\par \par \par \par \par \par \par \par \par \par \par \par \par \par \par \par \par \par \par \par \par \par \par \par \par \par \par \par \par

## 2020-12-14 DIAGNOSIS — F41.0 PANIC DISORDER [EPISODIC PAROXYSMAL ANXIETY]: ICD-10-CM

## 2020-12-14 DIAGNOSIS — F41.8 OTHER SPECIFIED ANXIETY DISORDERS: ICD-10-CM

## 2020-12-15 ENCOUNTER — NON-APPOINTMENT (OUTPATIENT)
Age: 60
End: 2020-12-15

## 2021-02-01 ENCOUNTER — APPOINTMENT (OUTPATIENT)
Dept: OPHTHALMOLOGY | Facility: CLINIC | Age: 61
End: 2021-02-01

## 2021-03-09 ENCOUNTER — RESULT CHARGE (OUTPATIENT)
Age: 61
End: 2021-03-09

## 2021-03-10 ENCOUNTER — RESULT CHARGE (OUTPATIENT)
Age: 61
End: 2021-03-10

## 2021-03-10 ENCOUNTER — OUTPATIENT (OUTPATIENT)
Dept: OUTPATIENT SERVICES | Facility: HOSPITAL | Age: 61
LOS: 1 days | End: 2021-03-10
Payer: SELF-PAY

## 2021-03-10 ENCOUNTER — APPOINTMENT (OUTPATIENT)
Dept: FAMILY MEDICINE | Facility: HOSPITAL | Age: 61
End: 2021-03-10

## 2021-03-10 VITALS
RESPIRATION RATE: 16 BRPM | TEMPERATURE: 97.6 F | HEART RATE: 88 BPM | WEIGHT: 176 LBS | DIASTOLIC BLOOD PRESSURE: 80 MMHG | HEIGHT: 65 IN | OXYGEN SATURATION: 98 % | SYSTOLIC BLOOD PRESSURE: 120 MMHG | BODY MASS INDEX: 29.32 KG/M2

## 2021-03-10 DIAGNOSIS — Z00.00 ENCOUNTER FOR GENERAL ADULT MEDICAL EXAMINATION WITHOUT ABNORMAL FINDINGS: ICD-10-CM

## 2021-03-10 DIAGNOSIS — Z98.890 OTHER SPECIFIED POSTPROCEDURAL STATES: Chronic | ICD-10-CM

## 2021-03-10 PROCEDURE — G0463: CPT

## 2021-03-11 DIAGNOSIS — Z12.31 ENCOUNTER FOR SCREENING MAMMOGRAM FOR MALIGNANT NEOPLASM OF BREAST: ICD-10-CM

## 2021-03-11 DIAGNOSIS — E11.9 TYPE 2 DIABETES MELLITUS WITHOUT COMPLICATIONS: ICD-10-CM

## 2021-04-19 ENCOUNTER — APPOINTMENT (OUTPATIENT)
Dept: ULTRASOUND IMAGING | Facility: HOSPITAL | Age: 61
End: 2021-04-19
Payer: COMMERCIAL

## 2021-04-19 ENCOUNTER — RESULT REVIEW (OUTPATIENT)
Age: 61
End: 2021-04-19

## 2021-04-19 ENCOUNTER — APPOINTMENT (OUTPATIENT)
Dept: MAMMOGRAPHY | Facility: HOSPITAL | Age: 61
End: 2021-04-19
Payer: COMMERCIAL

## 2021-04-19 ENCOUNTER — OUTPATIENT (OUTPATIENT)
Dept: OUTPATIENT SERVICES | Facility: HOSPITAL | Age: 61
LOS: 1 days | End: 2021-04-19
Payer: SELF-PAY

## 2021-04-19 DIAGNOSIS — Z12.31 ENCOUNTER FOR SCREENING MAMMOGRAM FOR MALIGNANT NEOPLASM OF BREAST: ICD-10-CM

## 2021-04-19 DIAGNOSIS — Z98.890 OTHER SPECIFIED POSTPROCEDURAL STATES: Chronic | ICD-10-CM

## 2021-04-19 DIAGNOSIS — Z85.3 PERSONAL HISTORY OF MALIGNANT NEOPLASM OF BREAST: ICD-10-CM

## 2021-04-19 PROCEDURE — 77063 BREAST TOMOSYNTHESIS BI: CPT | Mod: 26,52

## 2021-04-19 PROCEDURE — 77067 SCR MAMMO BI INCL CAD: CPT

## 2021-04-19 PROCEDURE — 77067 SCR MAMMO BI INCL CAD: CPT | Mod: 26,LT,52

## 2021-04-19 PROCEDURE — 77063 BREAST TOMOSYNTHESIS BI: CPT

## 2021-05-06 ENCOUNTER — APPOINTMENT (OUTPATIENT)
Dept: OPHTHALMOLOGY | Facility: CLINIC | Age: 61
End: 2021-05-06

## 2021-05-10 NOTE — ED PROVIDER NOTE - SKIN TEMP
Problem: Depressed Mood (Adult/Pediatric)  Goal: *STG: Participates in treatment plan  Outcome: Progressing Towards Goal  Note: Out on unit smiling and engaged w peers. Mood and affect bright and hopeful. Reports she feels safe with her thoughts and has spoken to her support system. Denies SI, daily goal is to discuss d/c plans with tx team. Staff focus is on offering support and reassurance.    Goal: *STG: Verbalizes anger, guilt, and other feelings in a constructive manor  Outcome: Progressing Towards Goal  Goal: *STG: Attends activities and groups  Outcome: Progressing Towards Goal  Goal: *STG: Demonstrates reduction in symptoms and increase in insight into coping skills/future focused  Outcome: Progressing Towards Goal  Goal: Interventions  Outcome: Progressing Towards Goal     Problem: Chemical Dependency (Adult/Pediatric)  Goal: *STG: Seeks staff when symptoms of withdrawal increase  Outcome: Progressing Towards Goal  Goal: *STG: Will identify negative impact of chemical dependency including the use of tobacco, alcohol, and other substances  Outcome: Progressing Towards Goal  Goal: Interventions  Outcome: Progressing Towards Goal warm

## 2021-05-20 ENCOUNTER — APPOINTMENT (OUTPATIENT)
Age: 61
End: 2021-05-20

## 2021-06-16 ENCOUNTER — NON-APPOINTMENT (OUTPATIENT)
Age: 61
End: 2021-06-16

## 2021-06-19 ENCOUNTER — APPOINTMENT (OUTPATIENT)
Dept: FAMILY MEDICINE | Facility: HOSPITAL | Age: 61
End: 2021-06-19

## 2021-06-19 ENCOUNTER — OUTPATIENT (OUTPATIENT)
Dept: OUTPATIENT SERVICES | Facility: HOSPITAL | Age: 61
LOS: 1 days | End: 2021-06-19
Payer: SELF-PAY

## 2021-06-19 VITALS
RESPIRATION RATE: 16 BRPM | DIASTOLIC BLOOD PRESSURE: 79 MMHG | TEMPERATURE: 98 F | SYSTOLIC BLOOD PRESSURE: 122 MMHG | OXYGEN SATURATION: 98 % | HEART RATE: 74 BPM

## 2021-06-19 DIAGNOSIS — Z00.00 ENCOUNTER FOR GENERAL ADULT MEDICAL EXAMINATION WITHOUT ABNORMAL FINDINGS: ICD-10-CM

## 2021-06-19 DIAGNOSIS — Z98.890 OTHER SPECIFIED POSTPROCEDURAL STATES: Chronic | ICD-10-CM

## 2021-06-19 PROCEDURE — 84443 ASSAY THYROID STIM HORMONE: CPT

## 2021-06-19 PROCEDURE — G0463: CPT

## 2021-06-19 PROCEDURE — 85025 COMPLETE CBC W/AUTO DIFF WBC: CPT

## 2021-06-19 PROCEDURE — 80053 COMPREHEN METABOLIC PANEL: CPT

## 2021-06-22 DIAGNOSIS — Z12.31 ENCOUNTER FOR SCREENING MAMMOGRAM FOR MALIGNANT NEOPLASM OF BREAST: ICD-10-CM

## 2021-06-22 DIAGNOSIS — E11.9 TYPE 2 DIABETES MELLITUS WITHOUT COMPLICATIONS: ICD-10-CM

## 2021-06-22 PROBLEM — R19.6 HALITOSIS: Status: ACTIVE | Noted: 2020-11-10

## 2021-06-22 PROBLEM — T14.8XXA HEMATOMA: Status: ACTIVE | Noted: 2020-10-07

## 2021-06-22 PROBLEM — D12.6 TUBULAR ADENOMA OF COLON: Status: ACTIVE | Noted: 2018-03-20

## 2021-06-22 PROBLEM — K64.9 HEMORRHOIDS, UNSPECIFIED HEMORRHOID TYPE: Status: ACTIVE | Noted: 2020-11-26

## 2021-06-22 LAB — HBA1C MFR BLD HPLC: 6.9

## 2021-06-24 ENCOUNTER — OUTPATIENT (OUTPATIENT)
Dept: OUTPATIENT SERVICES | Facility: HOSPITAL | Age: 61
LOS: 1 days | End: 2021-06-24

## 2021-06-24 DIAGNOSIS — Z98.890 OTHER SPECIFIED POSTPROCEDURAL STATES: Chronic | ICD-10-CM

## 2021-06-24 DIAGNOSIS — Z00.00 ENCOUNTER FOR GENERAL ADULT MEDICAL EXAMINATION WITHOUT ABNORMAL FINDINGS: ICD-10-CM

## 2021-06-28 ENCOUNTER — APPOINTMENT (OUTPATIENT)
Dept: FAMILY MEDICINE | Facility: HOSPITAL | Age: 61
End: 2021-06-28

## 2021-06-28 ENCOUNTER — OUTPATIENT (OUTPATIENT)
Dept: OUTPATIENT SERVICES | Facility: HOSPITAL | Age: 61
LOS: 1 days | End: 2021-06-28
Payer: SELF-PAY

## 2021-06-28 VITALS
OXYGEN SATURATION: 97 % | WEIGHT: 179 LBS | RESPIRATION RATE: 16 BRPM | BODY MASS INDEX: 29.79 KG/M2 | DIASTOLIC BLOOD PRESSURE: 82 MMHG | TEMPERATURE: 97.1 F | SYSTOLIC BLOOD PRESSURE: 129 MMHG | HEART RATE: 80 BPM

## 2021-06-28 DIAGNOSIS — Z00.00 ENCOUNTER FOR GENERAL ADULT MEDICAL EXAMINATION WITHOUT ABNORMAL FINDINGS: ICD-10-CM

## 2021-06-28 DIAGNOSIS — R53.83 OTHER FATIGUE: ICD-10-CM

## 2021-06-28 DIAGNOSIS — Z98.890 OTHER SPECIFIED POSTPROCEDURAL STATES: Chronic | ICD-10-CM

## 2021-06-28 DIAGNOSIS — G57.00 LESION OF SCIATIC NERVE, UNSPECIFIED LOWER LIMB: ICD-10-CM

## 2021-06-28 LAB
ALBUMIN SERPL ELPH-MCNC: 4.6 G/DL
ALP BLD-CCNC: 72 U/L
ALT SERPL-CCNC: 17 U/L
ANION GAP SERPL CALC-SCNC: 14 MMOL/L
AST SERPL-CCNC: 18 U/L
BASOPHILS # BLD AUTO: 0.03 K/UL
BASOPHILS NFR BLD AUTO: 0.5 %
BILIRUB SERPL-MCNC: 0.2 MG/DL
BUN SERPL-MCNC: 17 MG/DL
CALCIUM SERPL-MCNC: 9 MG/DL
CHLORIDE SERPL-SCNC: 108 MMOL/L
CO2 SERPL-SCNC: 22 MMOL/L
CREAT SERPL-MCNC: 0.6 MG/DL
EOSINOPHIL # BLD AUTO: 0.08 K/UL
EOSINOPHIL NFR BLD AUTO: 1.4 %
GLUCOSE SERPL-MCNC: 145 MG/DL
HCT VFR BLD CALC: 38.2 %
HGB BLD-MCNC: 12.1 G/DL
IMM GRANULOCYTES NFR BLD AUTO: 0.2 %
LYMPHOCYTES # BLD AUTO: 1.88 K/UL
LYMPHOCYTES NFR BLD AUTO: 32.6 %
MAN DIFF?: NORMAL
MCHC RBC-ENTMCNC: 29.4 PG
MCHC RBC-ENTMCNC: 31.7 GM/DL
MCV RBC AUTO: 92.7 FL
MONOCYTES # BLD AUTO: 0.4 K/UL
MONOCYTES NFR BLD AUTO: 6.9 %
NEUTROPHILS # BLD AUTO: 3.37 K/UL
NEUTROPHILS NFR BLD AUTO: 58.4 %
PLATELET # BLD AUTO: 223 K/UL
POTASSIUM SERPL-SCNC: 4.3 MMOL/L
PROT SERPL-MCNC: 6.6 G/DL
RBC # BLD: 4.12 M/UL
RBC # FLD: 13 %
SODIUM SERPL-SCNC: 145 MMOL/L
TSH SERPL-ACNC: 1.47 UIU/ML
WBC # FLD AUTO: 5.77 K/UL

## 2021-06-28 PROCEDURE — G0463: CPT

## 2021-06-29 ENCOUNTER — OUTPATIENT (OUTPATIENT)
Dept: OUTPATIENT SERVICES | Facility: HOSPITAL | Age: 61
LOS: 1 days | Discharge: HOME | End: 2021-06-29

## 2021-06-29 DIAGNOSIS — Z98.890 OTHER SPECIFIED POSTPROCEDURAL STATES: Chronic | ICD-10-CM

## 2021-06-29 DIAGNOSIS — C50.919 MALIGNANT NEOPLASM OF UNSPECIFIED SITE OF UNSPECIFIED FEMALE BREAST: ICD-10-CM

## 2021-06-29 PROBLEM — G57.00 PIRIFORMIS SYNDROME: Status: ACTIVE | Noted: 2021-06-19

## 2021-06-29 PROBLEM — R53.83 FATIGUE: Status: ACTIVE | Noted: 2021-06-19

## 2021-06-30 DIAGNOSIS — E11.9 TYPE 2 DIABETES MELLITUS WITHOUT COMPLICATIONS: ICD-10-CM

## 2021-06-30 DIAGNOSIS — G57.00 LESION OF SCIATIC NERVE, UNSPECIFIED LOWER LIMB: ICD-10-CM

## 2021-06-30 NOTE — ASSESSMENT
[FreeTextEntry1] : 59 y/o F with PMHx DM II, R breast cancer ( 2003) s/p chemotherapy and right mastectomy, panic attacks on escitalopram, GERD, H pylori infection, LAD myocardial bridge on metoprolol came to the clinic for follow up of bilateral buttock pain.\par \par #Piriformis syndrome\par - Pt reports buttock pain for three weeks ago after started to exercise.\par - Pt reports some improvement of the pain and on/off leg tingling sensation since is taking naproxen 500 mg PO BID \par - No fever, leg weakness, saddle anesthesia reported \par - Continue Naproxen 500 mg PO BID PRN\par - PT referral given in previous visit. Pt instructed to  make an appointment. \par \par #Fatigue \par - Improving since her some came to visit her the last week \par - TSH, H/H WNL \par - She is currently on escitalopram for management of panic attack but refused to increase dose \par - Pt also refused to speak with  \par - Could be secondary to emotional stressors \par \par #Panic attack disorder with concomitant depression\par - Pt reports is taking Escitalopram 2.5 mg PO daily. Pt supposed to be in 5 mg but she reports doesn't tolerate that dose and was instructed in the past to continue with 2.5 mg PO daily \par - Continue in Escitalopram 2.5 mg PO daily. We will monitor efficacy and consider increase dose. \par - Denies any new episode of panic attacks\par -Denies SI/HI\par \par # Diabetes Mellitus II \par - A1c on 6/19/21 7.4, from 6.9 in march, 2021\par - Continue on Metformin 1000 mg PO BID \par - Monofilament test WNL  \par - Pt reports is non compliant with diet \par - Pt counseled to exercise at least 150 min weekly  \par - Pt counseled to continue a diet low in carbs and high in vegetables and fruits \par \par #LAD Myocardial bridge \par -TTE performed on 11/16/20 showed LVEF 60-65%, normal systolic function \par -Continue Metoprolol Succ 12.5 mg PO BID  \par -Continue Atorvastatin 20 mg PO daily \par -Continue f/u with Cardiology \par \par #History of Tubular Adenoma\par - Dx 2014 \par -Repeated colonoscopy on 05/2018 showed sigmoid diverticulosis with internal hemorrhoids  and no polyps \par -Positive Family History of Colon CA \par -Repeat Colonoscopy in 2023\par -Continue GI follow up \par \par # Preventive measures \par - Immunizations up to date \par - Pap performed on Feb, 2019 WNL \par -Colonoscopy performed on 2018 showed sigmoid diverticulosis and internal hemorrhoids. Repeat in 2023  \par - Last Mammogram  on 19/4/21 showed no evidence of malignancy.  \par \par RTC in 3 month for DM follow up \par \par Case discussed with Dr. Adan\par

## 2021-06-30 NOTE — HISTORY OF PRESENT ILLNESS
[No episodes] : No hypoglycemic episodes since the last visit. [Check glucose ___ x/day] : Patient checks  blood glucose [unfilled]  times a day [Fasting:  ___] : Fasting Blood Sugar: [unfilled] mg/dL [Understanding of foot care] : Patient expressed understanding of foot care [FreeTextEntry6] : 60 year old F  DM II, R breast cancer ( 2003) s/p chemotherapy and right mastectomy, panic attacks on escitalopram, GERD, H pylori infection, , CAD presenting for DM management. \par \par requesting a nutritionist referral [de-identified] : Wants referral to nutritionist; Walks 3 times a week ~ 15 minutes each

## 2021-06-30 NOTE — HISTORY OF PRESENT ILLNESS
[FreeTextEntry1] : f/u piriformis syndrome  [de-identified] : 61 y/o F with PMHx DM II, R breast cancer ( 2003) s/p chemotherapy and right mastectomy, panic attacks on escitalopram, GERD, H pylori infection, LAD myocardial bridge on metoprolol came to the clinic for follow up of bilateral buttock pain.Pt last time seen at clinic was on 6/19/21 for evaluation of bilateral buttock pain with associated on/off tingling sensation of the leg suspected to be secondary to piriformis syndrome. Pt was prescribed naproxen 500 mg PO BID x 7 days and referred to PT. Pt reports some improvement in the buttock pain since is taking Naproxen. She only endorse pain when sit down of an intensity 3/10. She had not schedule PT yet. Pt also reports improvement in the on/off tingling sensation of the legs.  She also endorse improvement in the anxiety and fatigue since her son came to visit her the last week from [facility]. She denies suicidal ideation or plan. She denies any recent trauma or fall, numbness in the legs, saddle anesthesia, fever and chills.

## 2021-06-30 NOTE — PLAN
[FreeTextEntry1] : 60 year old F as above\par \par #DM\par - A1C 6.9\par - Renew statin, metformin\par - Nutrtionist referal given\par \par \par #HM\par - medication renewed\par - Mammo referral given\par \par RTC 3 months for DM management

## 2021-07-31 ENCOUNTER — NON-APPOINTMENT (OUTPATIENT)
Age: 61
End: 2021-07-31

## 2021-07-31 ENCOUNTER — OUTPATIENT (OUTPATIENT)
Dept: OUTPATIENT SERVICES | Facility: HOSPITAL | Age: 61
LOS: 1 days | End: 2021-07-31
Payer: SELF-PAY

## 2021-07-31 ENCOUNTER — APPOINTMENT (OUTPATIENT)
Dept: FAMILY MEDICINE | Facility: HOSPITAL | Age: 61
End: 2021-07-31

## 2021-07-31 VITALS
SYSTOLIC BLOOD PRESSURE: 139 MMHG | OXYGEN SATURATION: 99 % | RESPIRATION RATE: 16 BRPM | DIASTOLIC BLOOD PRESSURE: 82 MMHG | HEIGHT: 65 IN | HEART RATE: 79 BPM | TEMPERATURE: 96.9 F | WEIGHT: 188 LBS | BODY MASS INDEX: 31.32 KG/M2

## 2021-07-31 DIAGNOSIS — I87.2 VENOUS INSUFFICIENCY (CHRONIC) (PERIPHERAL): ICD-10-CM

## 2021-07-31 DIAGNOSIS — Z00.00 ENCOUNTER FOR GENERAL ADULT MEDICAL EXAMINATION WITHOUT ABNORMAL FINDINGS: ICD-10-CM

## 2021-07-31 DIAGNOSIS — Z98.890 OTHER SPECIFIED POSTPROCEDURAL STATES: Chronic | ICD-10-CM

## 2021-07-31 PROCEDURE — G0463: CPT

## 2021-08-01 ENCOUNTER — OUTPATIENT (OUTPATIENT)
Dept: OUTPATIENT SERVICES | Facility: HOSPITAL | Age: 61
LOS: 1 days | Discharge: HOME | End: 2021-08-01

## 2021-08-01 DIAGNOSIS — Z98.890 OTHER SPECIFIED POSTPROCEDURAL STATES: Chronic | ICD-10-CM

## 2021-08-01 DIAGNOSIS — C50.919 MALIGNANT NEOPLASM OF UNSPECIFIED SITE OF UNSPECIFIED FEMALE BREAST: ICD-10-CM

## 2021-08-03 ENCOUNTER — APPOINTMENT (OUTPATIENT)
Dept: HEMATOLOGY ONCOLOGY | Facility: CLINIC | Age: 61
End: 2021-08-03

## 2021-08-03 NOTE — REVIEW OF SYSTEMS
[Fever] : no fever [Chills] : no chills [Chest Pain] : no chest pain [Palpitations] : no palpitations [Shortness Of Breath] : no shortness of breath [Wheezing] : no wheezing [Abdominal Pain] : no abdominal pain [Easy Bleeding] : no tendency for easy bleeding [Easy Bruising] : no tendency for easy bruising

## 2021-08-03 NOTE — REASON FOR VISIT
Date: 8/13/2021  Patient name: Faby Rosa  YOB: 1943  Medical Record Number: [id number]  Coverage: Payor: MEDICARE / Plan: MEDICARE PART A&B / Product Type: *No Product type* /   Insurance ID: [id number]  Patient Address: [address] [place] [address] None None   Shape Measured in a cluster Measured in a cluster       Inactive Orders   Date Order Priority Status Authorizing Provider   08/06/21 8957 OP WOUND DRESSING Routine Completed Jhoana Machuca MD     - Cleansing:    Cleanse with normal saline Kerramax/Super absorbent     - Additional Wound Dressing Information:    zinc periwound       Compression Wrap 08/06/21 Leg Right (Active)   Placement Date/Time: 08/06/21 6676   Location: Leg  Wound Location Orientation: Right      Assessments 8/6/2021  4: [Follow-Up Visit] : a follow-up [FreeTextEntry2] : breast cancer

## 2021-08-03 NOTE — HISTORY OF PRESENT ILLNESS
[Disease: _____________________] : Disease: [unfilled] [AJCC Stage: ____] : AJCC Stage: [unfilled] [de-identified] : Pt was initially diagnosed in July 2006 at age 46. \par \par She underwent right mastectomy ([facility]) and reconstruction (2008, [facility]).   Stage 2 lobular breast cancer (ER+/HI+/Her2-) \par \par Post op, pt was treated with 8 cycles of adriamycin + cyclophosphamide then Taxol.\par \par She completed radiation in May 2007.\par \par Pt was treated with tamoxifen x 5 years which she completed in 2012.\par \par \par \par  [de-identified] : \par Last seen in 10/22/2018 with Dr. Danica Cox.\par On active surveillance\par Last mammogram 4/19/21, no mammographic evidence of malignancy, BIRADS2.\par \par HEALTH MAINTENANCE\par DEXA done in 2015, was normal.  Due for DEXA scan, referral provided. \par Colonoscopy done 2018, normal, next due in 2023.  Patient has had 3 colonoscopies total. \par Pap smear done July 2018.  [0 - No Distress] : Distress Level: 0

## 2021-08-03 NOTE — ASSESSMENT
[FreeTextEntry1] : 58F w/ PMH of Stage 2 lobular breast cancer (ER+/ND+/Her2-) diagnosed in July 2006 at age 46 s/p right mastectomy ([facility]) and reconstruction (2008, [facility]) treated with 8 cycles of Adriamycin + cyclophosphamide then Taxol as well as XRT (completed May 2007), then tamoxifen x 5 years (completed 2012).\par \par \par \par \par \par \par \par

## 2021-08-08 NOTE — PHYSICAL EXAM
[No Acute Distress] : no acute distress [Well Nourished] : well nourished [Well Developed] : well developed [No Respiratory Distress] : no respiratory distress  [No Accessory Muscle Use] : no accessory muscle use [Clear to Auscultation] : lungs were clear to auscultation bilaterally [Normal Rate] : normal rate  [Regular Rhythm] : with a regular rhythm [Normal S1, S2] : normal S1 and S2 [Pedal Pulses Present] : the pedal pulses are present [Grossly Normal Strength/Tone] : grossly normal strength/tone [de-identified] : Varicoses Present. Trace B/L Pedal edema [de-identified] : TTP along Peroneal tendon on the right foot

## 2021-08-08 NOTE — HISTORY OF PRESENT ILLNESS
[FreeTextEntry1] : pal buttock pain  [de-identified] : 61 y/o F with PMHx DM II, R breast cancer ( 2003) s/p chemotherapy and right mastectomy, panic attacks on escitalopram, GERD, H pylori infection, LAD myocardial bridge on metoprolol came to the clinic for evaluation of bilateral buttock pain. Pt reports two weeks ago started to do exercise and since then is  having bilateral buttock pain. Pt describes pain as sharp and associated with on/off leg tingling sensation. Buttock pain is worse with sitting and walking. She denies any recent trauma or fall. She also denies numbness in the legs, saddle anesthesia, fever and chills.  Pt also reports worsening fatigue since two weeks ago. She endorse feeling anxious and worry about her son that lives in [facility]. She states son while came to visit her the next week and feel happy will be reunited with him. She states is using escitalopram 2.5 mg PO daily and refused to increase the dose. She denies suicidal ideation or plan.

## 2021-08-08 NOTE — REVIEW OF SYSTEMS
[Fatigue] : fatigue [Negative] : Gastrointestinal [Fever] : no fever [Chills] : no chills [Hot Flashes] : no hot flashes [Night Sweats] : no night sweats [Recent Change In Weight] : ~T no recent weight change [FreeTextEntry9] : buttock pain, bill leg tingling

## 2021-08-08 NOTE — ASSESSMENT
[FreeTextEntry1] : 61 y/o F with PMHx DM II, R breast cancer ( 2003) s/p chemotherapy and right mastectomy, panic attacks on escitalopram, GERD, H pylori infection, LAD myocardial bridge on metoprolol came to the clinic for evaluation of bilateral buttock/hip pain. \par \par #Piriformis syndrome\par - Pt report buttock pain with leg tingling sensation since two weeks ago after started to exercise.\par - No fever, leg weakness, saddle anesthesia reported \par - Start Naproxen 500 mg PO BID x 10 days \par - PT referral given \par \par #Fatigue \par - Started two weeks ago \par - Pt endorse feeling anxious and worry about her son that lives in [facility]\par - She is currently on escitalopram for management of panic attack but refused to increase dose \par - Pt also refused to speak with  \par - Could be secondary to emotional stressors\par - Check TSH and CBC to r/o anemia or thyroid disorders \par \par #Panic attack disorder with concomitant depression\par - Pt reports is taking Escitalopram 2.5 mg PO daily. Pt supposed to be in 5 mg but she reports doesn't tolerate that dose and was instructed in the past to continue with 2.5 mg PO daily \par - Continue in Escitalopram 2.5 mg PO daily. We will monitor efficacy and consider increase dose. \par - Denies any new episode of panic attacks\par -Denies SI/HI\par \par # Diabetes Mellitus II \par - A1c today 7.4, from 6.9 in march, 2021\par - Continue on Metformin 1000 mg PO BID \par - Monofilament test WNL  \par - Pt reports is non compliant with diet \par - Pt counseled to exercise at least 150 min weekly  \par - Pt counseled to continue a diet low in carbs and high in vegetables and fruits \par \par #LAD Myocardial bridge \par -TTE performed on 11/16/20 showed LVEF 60-65%, normal systolic function \par -Continue Metoprolol Succ 12.5 mg PO BID  \par -Continue Atorvastatin 20 mg PO daily \par -Continue f/u with Cardiology \par \par #History of Tubular Adenoma\par - Dx 2014 \par -Repeated colonoscopy on 05/2018 showed sigmoid diverticulosis with internal hemorrhoids  and no polyps \par -Positive Family History of Colon CA \par -Repeat Colonoscopy in 2023\par -Continue GI follow up \par \par # Preventive measures \par - Immunizations up to date \par - Pap performed on Feb, 2019 WNL \par -Colonoscopy performed on 2018 showed sigmoid diverticulosis and internal hemorrhoids. Repeat in 2023  \par - Last Mammogram on feb, 2020 WNL. Mammogram order given in previous visit but have not done it yet. Pt will follow up with hemato-onc the next month \par \par RTC in 2 weeks for follow up of buttock pain \par \par Case discussed with Dr Liu \par \par \par \par \par \par \par \par \par \par

## 2021-08-08 NOTE — PLAN
[FreeTextEntry1] : \par \par \par #Peroneal Tendonitis \par -Encouraged RICE Therapy \par -Take naproxen 250mg PRN\par \par #Chronic Venous Insufficiency\par -Educated patient on condition and proper use of lower extremity compressions \par -Patient given a short course of lasix upon her request\par \par \par RTC PRN

## 2021-08-08 NOTE — REVIEW OF SYSTEMS
[Lower Ext Edema] : lower extremity edema [Joint Pain] : joint pain [Fever] : no fever [Chills] : no chills [Night Sweats] : no night sweats [Recent Change In Weight] : ~T no recent weight change [Chest Pain] : no chest pain [Palpitations] : no palpitations [Orthopnea] : no orthopnea [Shortness Of Breath] : no shortness of breath [Cough] : no cough [Wheezing] : no wheezing [Joint Stiffness] : no joint stiffness [Joint Swelling] : no joint swelling [Muscle Weakness] : no muscle weakness [Muscle Pain] : no muscle pain [Back Pain] : no back pain

## 2021-08-08 NOTE — HISTORY OF PRESENT ILLNESS
[FreeTextEntry8] : Patient is a 60 year old overweight female who presents with the acute complaint of b/l lower feet swelling and pain in the right ankle. Patient reports she began having swelling and right ankle pain yesterday after undergoing a 11 hour car ride from [place] to [facility] yesterday. Patient denies calf tenderness, dyspnea, cough, chest pain.  Denies smoking hx.  Patient reports prior to the car ride she "walked more in three days than she has in the past 5 years".  Patient has not iced or taken anything for the pain.

## 2021-08-08 NOTE — PHYSICAL EXAM
[Normal] : soft, non-tender, non-distended, no masses palpated, no HSM and normal bowel sounds [Comprehensive Foot Exam Normal] : Right and left foot were examined and both feet are normal. No ulcers in either foot. Toes are normal and with full ROM.  Normal tactile sensation with monofilament testing throughout both feet [de-identified] : Mild discomfort to palpation  in the lower buttock area

## 2021-09-15 ENCOUNTER — RESULT CHARGE (OUTPATIENT)
Age: 61
End: 2021-09-15

## 2021-09-15 ENCOUNTER — MED ADMIN CHARGE (OUTPATIENT)
Age: 61
End: 2021-09-15

## 2021-09-15 ENCOUNTER — APPOINTMENT (OUTPATIENT)
Dept: FAMILY MEDICINE | Facility: HOSPITAL | Age: 61
End: 2021-09-15

## 2021-09-15 ENCOUNTER — OUTPATIENT (OUTPATIENT)
Dept: OUTPATIENT SERVICES | Facility: HOSPITAL | Age: 61
LOS: 1 days | End: 2021-09-15
Payer: SELF-PAY

## 2021-09-15 VITALS
OXYGEN SATURATION: 97 % | SYSTOLIC BLOOD PRESSURE: 138 MMHG | DIASTOLIC BLOOD PRESSURE: 85 MMHG | WEIGHT: 181 LBS | BODY MASS INDEX: 29.09 KG/M2 | HEIGHT: 66 IN | TEMPERATURE: 97 F | HEART RATE: 67 BPM | RESPIRATION RATE: 14 BRPM

## 2021-09-15 DIAGNOSIS — F41.8 OTHER SPECIFIED ANXIETY DISORDERS: ICD-10-CM

## 2021-09-15 DIAGNOSIS — Z00.00 ENCOUNTER FOR GENERAL ADULT MEDICAL EXAMINATION WITHOUT ABNORMAL FINDINGS: ICD-10-CM

## 2021-09-15 DIAGNOSIS — Z92.89 PERSONAL HISTORY OF OTHER MEDICAL TREATMENT: ICD-10-CM

## 2021-09-15 DIAGNOSIS — F41.0 PANIC DISORDER [EPISODIC PAROXYSMAL ANXIETY]: ICD-10-CM

## 2021-09-15 DIAGNOSIS — Z98.890 OTHER SPECIFIED POSTPROCEDURAL STATES: Chronic | ICD-10-CM

## 2021-09-15 DIAGNOSIS — M76.71 PERONEAL TENDINITIS, RIGHT LEG: ICD-10-CM

## 2021-09-15 DIAGNOSIS — R61 GENERALIZED HYPERHIDROSIS: ICD-10-CM

## 2021-09-15 PROCEDURE — 82043 UR ALBUMIN QUANTITATIVE: CPT

## 2021-09-15 PROCEDURE — G0463: CPT

## 2021-09-15 RX ORDER — NAPROXEN 500 MG/1
500 TABLET ORAL
Qty: 10 | Refills: 0 | Status: DISCONTINUED | COMMUNITY
Start: 2021-07-31 | End: 2021-09-15

## 2021-09-15 RX ORDER — NAPROXEN 500 MG/1
500 TABLET ORAL
Qty: 14 | Refills: 0 | Status: DISCONTINUED | COMMUNITY
Start: 2021-06-29 | End: 2021-09-15

## 2021-09-15 NOTE — REVIEW OF SYSTEMS
[Joint Pain] : no joint pain [Joint Stiffness] : no joint stiffness [Joint Swelling] : no joint swelling [Muscle Weakness] : no muscle weakness [Muscle Pain] : no muscle pain [Back Pain] : no back pain [FreeTextEntry9] : pal buttock pain  [Fatigue] : fatigue [Recent Change In Weight] : ~T recent weight change [Negative] : Gastrointestinal

## 2021-09-16 NOTE — HISTORY OF PRESENT ILLNESS
[FreeTextEntry1] : excessive sweating  [de-identified] : 59 y/o F with PMHx DM II, R breast cancer ( 2003) s/p chemotherapy and right mastectomy, panic attacks on escitalopram, GERD, H pylori infection, LAD myocardial bridge on metoprolol came to the clinic for evaluation of excessive sweating. Pt last time seen on clinic was on  7/31/21 for evaluation of foot swelling and R leg pain. Pt reports foot swelling improved. Today pt reports excessive sweating since two weeks ago. Pt reports sweating is only in the face , trunk , arms and back. Pt reports every time she does exertion like cleaning the house start to sweat. Pt  reports is using  thin clothes and turn on the air-conditioning and still have excessive sweating. Pt reports associate irritated mood  that have got worse since her last birthday. Pt reports LMP was in 2006. Pt reports missed the appointment with oncology but will be schedule an appointment soon. She denies palpitations, hot flashes, SOB, chest pain and any other symptoms. \par \par Pt also have a hx of DM II managed by metformin 1000  mg PO daily. Pt reports is walking 5 times weekly. Pt endorse is having a bad diet and is eating junk food. Pt reports have not be able to see the opthalmology due to multiple cancelled appointments but will schedule an appointment on October. She denies polyphagia, polydipsia, nocturia and any other symptoms.  permanent dentures

## 2021-09-16 NOTE — ASSESSMENT
[FreeTextEntry1] : 59 y/o F with PMHx DM II, R breast cancer ( 2003) s/p chemotherapy and right mastectomy, panic attacks on escitalopram, GERD, H pylori infection, LAD myocardial bridge on metoprolol came to the clinic for evaluation of hyperhidrosis. \par \par #Hyperhidrosis \par - Started 2 weeks ago \par - Pt reports excessive sweating only with exertion in the face, trunk , back and arm areas. \par - No fever, chills,  palpitation, SOB, chest pain reported \par - Reports associated irritable mood \par - LMP was on 2006 \par - TSH on June, 2021 WNL \par - Could be secondary  to normal response to hor weather vs menopause vs emotional \par - Pt usually wear long clothes and was intrusted to use more adequate cloth for the current weather \par - Recommended to increase Sertraline to 5 mg PO daily. At the beginning patient was reluctant but stated will do it. \par \par #Panic attack disorder with concomitant depression\par - Pt reports is taking Escitalopram 2.5 mg PO daily. Pt supposed to be in 5 mg but she reports doesn't tolerate that dose and was instructed in the past to continue with 2.5 mg PO daily \par - Increase Escitalopram to 5 mg PO daily. \par - Denies any new episode of panic attacks\par -Denies SI/HI\par \par # Diabetes Mellitus II \par -  A1 c today 7.3%, from 7.4 % on 6/19/21\par - Continue on Metformin 1000 mg PO BID \par - Check microalbumin/cr ratio \par - Opthalmology referral reprinted \par - Pt reports is non compliant with diet \par - Pt counseled to exercise at least 150 min weekly  \par - Pt counseled to continue a diet low in carbs and high in vegetables and fruits \par \par #History of Tubular Adenoma\par - Dx 2014 \par -Repeated colonoscopy on 05/2018 showed sigmoid diverticulosis with internal hemorrhoids  and no polyps \par -Positive Family History of Colon CA \par -Repeat Colonoscopy in 2023\par -Continue GI follow up \par \par # Preventive measures \par - Flu vaccine administered today \par - Pap performed on Feb, 2019 WNL \par -Colonoscopy performed on 2018 showed sigmoid diverticulosis and internal hemorrhoids. Repeat in 2023  \par - Last Mammogram  on 19/4/21 showed no evidence of malignancy.  \par \par RTC in 2-4 weeks for follow up of hyperhidrosis \par \par Case discussed with Dr. Klein \par \par \par \par \par \par \par

## 2021-09-17 LAB
CREAT SPEC-SCNC: 154 MG/DL
HBA1C MFR BLD HPLC: 7.3
MICROALBUMIN 24H UR DL<=1MG/L-MCNC: 2.5 MG/DL
MICROALBUMIN/CREAT 24H UR-RTO: 16 MG/G

## 2021-09-19 DIAGNOSIS — R61 GENERALIZED HYPERHIDROSIS: ICD-10-CM

## 2021-09-19 DIAGNOSIS — E11.9 TYPE 2 DIABETES MELLITUS WITHOUT COMPLICATIONS: ICD-10-CM

## 2021-11-01 ENCOUNTER — NON-APPOINTMENT (OUTPATIENT)
Age: 61
End: 2021-11-01

## 2021-11-01 ENCOUNTER — APPOINTMENT (OUTPATIENT)
Dept: OPHTHALMOLOGY | Facility: CLINIC | Age: 61
End: 2021-11-01
Payer: SELF-PAY

## 2021-11-01 PROCEDURE — 92004 COMPRE OPH EXAM NEW PT 1/>: CPT

## 2021-12-09 ENCOUNTER — APPOINTMENT (OUTPATIENT)
Dept: OPHTHALMOLOGY | Facility: CLINIC | Age: 61
End: 2021-12-09

## 2021-12-28 ENCOUNTER — APPOINTMENT (OUTPATIENT)
Dept: FAMILY MEDICINE | Facility: HOSPITAL | Age: 61
End: 2021-12-28

## 2021-12-28 ENCOUNTER — OUTPATIENT (OUTPATIENT)
Dept: OUTPATIENT SERVICES | Facility: HOSPITAL | Age: 61
LOS: 1 days | End: 2021-12-28
Payer: SELF-PAY

## 2021-12-28 VITALS
HEART RATE: 75 BPM | TEMPERATURE: 97 F | SYSTOLIC BLOOD PRESSURE: 153 MMHG | WEIGHT: 180 LBS | DIASTOLIC BLOOD PRESSURE: 86 MMHG | HEIGHT: 66 IN | RESPIRATION RATE: 14 BRPM | BODY MASS INDEX: 28.93 KG/M2 | OXYGEN SATURATION: 98 %

## 2021-12-28 DIAGNOSIS — Z98.890 OTHER SPECIFIED POSTPROCEDURAL STATES: Chronic | ICD-10-CM

## 2021-12-28 DIAGNOSIS — Z00.00 ENCOUNTER FOR GENERAL ADULT MEDICAL EXAMINATION WITHOUT ABNORMAL FINDINGS: ICD-10-CM

## 2021-12-28 PROCEDURE — G0463: CPT

## 2021-12-28 NOTE — ASSESSMENT
[FreeTextEntry1] : 61 y/o F with PMHx DM II, R breast cancer ( 2003) s/p chemotherapy and right mastectomy, panic attacks on escitalopram, GERD, H pylori infection, LAD myocardial bridge on metoprolol came to the clinic c/o lightheadedness\par \par \par #BPPV\par -Epleyruby reyes discussed with the patient \par -Meclizine 25mg q8 PRN for symptoms.\par -risks, benefits and side effects of the medications discussed with the patient in great detail. Patient verbalizes and reports of understanding via teachback method \par \par #Right Middle Ear Effusion \par -Flonase as directed \par -Zyrtec as directed \par -no need for abx at this time \par \par #DM\par -c/w Metformin 1000mg BID \par \par #HLD\par -c/w Atorvastatin 20mg QHS \par \par #HTN\par -elevated BP today \par -c/w Metoprolol Succinate 25mg ER \par -monitor BP \par \par #HCM\par SCREENING \par -HIV/HCV: deferred  \par -Colon Cancer: Hx of tubular adenoma, repeat colonoscopy in 2023\par -Breast Cancer: UTD \par -Cervical Cancer: UTD \par -Osteoporosis: s/p DEXA showing Osteopenia of L/S: currently on Vit D + Calcium \par  \par \par IMMUNIZATIONS\par -Flu: UTD \par -TDaP: UTD \par -Pneumonia: UTD  \par \par \par #RTC in 2-3 week for symptom evaluation and chronic disease management \par \par Case and plan discussed with Dr. Shahida Bustillos. \par  \par

## 2021-12-28 NOTE — HISTORY OF PRESENT ILLNESS
[FreeTextEntry8] : 61 y/o F with PMHx DM II, R breast cancer ( 2003) s/p chemotherapy and right mastectomy, panic attacks on escitalopram, GERD, H pylori infection, LAD myocardial bridge on metoprolol came to the clinic c/o lightheadedness. Patient reports of sensation "that room is spinning". States the symptoms started today and lasted for few secs and resolved spontaneously. Reports prior Hx of labyrinthitis. Patient denies fever, chills, headache, change in vision, blurry vision, anosmia, ageusia, cough, sore throat, chest pain, palpitations, shortness of breath, abdominal pain, nausea, vomiting, diarrhea, constipation, hematochezia, melena, change in bowel movement, weight loss, dysuria, urinary frequency, urgency, hematuria, numbness, weakness or tingling.No sick contacts, no recent travel.

## 2021-12-28 NOTE — REVIEW OF SYSTEMS
[Postnasal Drip] : postnasal drip [Negative] : Heme/Lymph [FreeTextEntry4] : (+)ear fullness  [de-identified] : (+)sensation of room is spinning

## 2021-12-28 NOTE — PHYSICAL EXAM
LOV 9/2/20  LRF 10/30/20     [Coordination Grossly Intact] : coordination grossly intact [No Focal Deficits] : no focal deficits [Normal Gait] : normal gait [Normal] : affect was normal and insight and judgment were intact [de-identified] : (+)RIGHT EAR: (+)clear effusion noted, normal TM, normal canal, LEFT EAR: WNL; NOSE: (+)erythematous turbinates, (+)mild clear  rhinorrhea, (+)erythematous posterior pharynx, (+)postnasal drip  [de-identified] : (+)Sharlene

## 2021-12-30 DIAGNOSIS — H65.91 UNSPECIFIED NONSUPPURATIVE OTITIS MEDIA, RIGHT EAR: ICD-10-CM

## 2021-12-30 DIAGNOSIS — H81.10 BENIGN PAROXYSMAL VERTIGO, UNSPECIFIED EAR: ICD-10-CM

## 2022-01-12 ENCOUNTER — APPOINTMENT (OUTPATIENT)
Dept: FAMILY MEDICINE | Facility: HOSPITAL | Age: 62
End: 2022-01-12

## 2022-02-23 ENCOUNTER — OUTPATIENT (OUTPATIENT)
Dept: OUTPATIENT SERVICES | Facility: HOSPITAL | Age: 62
LOS: 1 days | Discharge: HOME | End: 2022-02-23

## 2022-02-23 DIAGNOSIS — Z98.890 OTHER SPECIFIED POSTPROCEDURAL STATES: Chronic | ICD-10-CM

## 2022-02-23 DIAGNOSIS — C50.919 MALIGNANT NEOPLASM OF UNSPECIFIED SITE OF UNSPECIFIED FEMALE BREAST: ICD-10-CM

## 2022-02-25 ENCOUNTER — APPOINTMENT (OUTPATIENT)
Dept: HEMATOLOGY ONCOLOGY | Facility: CLINIC | Age: 62
End: 2022-02-25
Payer: COMMERCIAL

## 2022-02-25 VITALS
SYSTOLIC BLOOD PRESSURE: 136 MMHG | BODY MASS INDEX: 30.8 KG/M2 | DIASTOLIC BLOOD PRESSURE: 84 MMHG | HEART RATE: 91 BPM | WEIGHT: 180.4 LBS | OXYGEN SATURATION: 98 % | TEMPERATURE: 96.6 F | RESPIRATION RATE: 16 BRPM | HEIGHT: 63.98 IN

## 2022-02-25 PROCEDURE — 99214 OFFICE O/P EST MOD 30 MIN: CPT

## 2022-02-25 NOTE — ASSESSMENT
[FreeTextEntry1] : She is a 62 y/o  F with Stage II invasive lobular carcinoma in 2006. We reviewed signs and symptoms of breast cancer recurrence. She has no new signs or symptoms. Will have mammogram this April for the L; reviewed bone density and will have done at the same time. Blood work from 6/2021 reviewed CBC WNL since chemotherapy. We reviewed calcium and Vitamin D supplementation along with exercise to maintain bone health. Next follow up in 1 year but earlier if any new symptoms.\par

## 2022-02-25 NOTE — PHYSICAL EXAM
[Fully active, able to carry on all pre-disease performance without restriction] : Status 0 - Fully active, able to carry on all pre-disease performance without restriction [Normal] : affect appropriate [de-identified] : R mastectomy; no abnl masses or axillary LN palpable

## 2022-02-25 NOTE — HISTORY OF PRESENT ILLNESS
[Disease: _____________________] : Disease: [unfilled] [AJCC Stage: ____] : AJCC Stage: [unfilled] [de-identified] : Age 46: Stage 2 lobular breast cancer (ER+/AL+/Her2-) diagnosed in July 2006 s/p right mastectomy ([facility]) and reconstruction (2008, [facility]) treated with 8 cycles of adriamycin + cyclophosphamide then Taxol as well as XRT (completed May 2007), then tamoxifen x 5 years (completed 2012).\par  [de-identified] : invasive lobular carcinoma ER/ CA positive [id number] negative  [de-identified] : She was last seen in clinic with Dr Cox in 2019. She had last mammogram done in 4/2021. Denies any new chest wall change or breast pain in the L. She has good ROM since her R mastectomy. No new cough or back pain or SOB. She will try to exercise more to maintain good bone health and healthy weight. Denies any new vision change or headaches. She is re establishing continuity of care.

## 2022-04-05 ENCOUNTER — APPOINTMENT (OUTPATIENT)
Dept: FAMILY MEDICINE | Facility: HOSPITAL | Age: 62
End: 2022-04-05

## 2022-04-05 ENCOUNTER — RESULT CHARGE (OUTPATIENT)
Age: 62
End: 2022-04-05

## 2022-04-05 ENCOUNTER — OUTPATIENT (OUTPATIENT)
Dept: OUTPATIENT SERVICES | Facility: HOSPITAL | Age: 62
LOS: 1 days | End: 2022-04-05
Payer: SELF-PAY

## 2022-04-05 VITALS
HEART RATE: 94 BPM | DIASTOLIC BLOOD PRESSURE: 83 MMHG | SYSTOLIC BLOOD PRESSURE: 125 MMHG | TEMPERATURE: 96.9 F | OXYGEN SATURATION: 96 % | WEIGHT: 178 LBS | RESPIRATION RATE: 14 BRPM | BODY MASS INDEX: 30.57 KG/M2

## 2022-04-05 DIAGNOSIS — R82.90 UNSPECIFIED ABNORMAL FINDINGS IN URINE: ICD-10-CM

## 2022-04-05 DIAGNOSIS — H81.10 BENIGN PAROXYSMAL VERTIGO, UNSPECIFIED EAR: ICD-10-CM

## 2022-04-05 DIAGNOSIS — Z98.890 OTHER SPECIFIED POSTPROCEDURAL STATES: Chronic | ICD-10-CM

## 2022-04-05 DIAGNOSIS — Z00.00 ENCOUNTER FOR GENERAL ADULT MEDICAL EXAMINATION WITHOUT ABNORMAL FINDINGS: ICD-10-CM

## 2022-04-05 NOTE — REVIEW OF SYSTEMS
[Negative] : Gastrointestinal [Chest Pain] : no chest pain [Palpitations] : no palpitations [Orthopnea] : no orthopnea [Shortness Of Breath] : no shortness of breath [Wheezing] : no wheezing [Dysuria] : no dysuria [Incontinence] : no incontinence [Hematuria] : no hematuria [Frequency] : no frequency [Headache] : no headache [Dizziness] : no dizziness [Fainting] : no fainting [Memory Loss] : no memory loss [FreeTextEntry8] : +malodorous urine

## 2022-04-05 NOTE — PHYSICAL EXAM
[No Respiratory Distress] : no respiratory distress  [Clear to Auscultation] : lungs were clear to auscultation bilaterally [Normal Rate] : normal rate  [Regular Rhythm] : with a regular rhythm [Normal S1, S2] : normal S1 and S2 [No Carotid Bruits] : no carotid bruits [No Abdominal Bruit] : a ~M bruit was not heard ~T in the abdomen [No Varicosities] : no varicosities [No Edema] : there was no peripheral edema [No Extremity Clubbing/Cyanosis] : no extremity clubbing/cyanosis [Normal] : soft, non-tender, non-distended, no masses palpated, no HSM and normal bowel sounds [No Joint Swelling] : no joint swelling [Coordination Grossly Intact] : coordination grossly intact [No Focal Deficits] : no focal deficits [Normal Gait] : normal gait

## 2022-04-05 NOTE — HISTORY OF PRESENT ILLNESS
[FreeTextEntry8] : 59 y/o F with PMHx DM II, R breast cancer ( 2003) s/p chemotherapy and right mastectomy, panic attacks on escitalopram, GERD, H pylori infection, LAD myocardial bridge on metoprolol and fibroids came to the clinic c/o left sided pelvic pain that is worsened when she lifts heavy objects and lays down that started approximately 6 days ago. Patient has been taking advil which she states offers temporary relief. Patient reports a history of abdominal hernia that she states has been stable. Patient is concerned the pain is stemming from her ovary as she has a history of primary breast malignancy. She denies symptoms of fevers, discharge, post-coital bleeding, dyspareunia, BRBPR.   \par \par Patient is also endorsing symptoms of malodorous urine which she states started a year ago. She was told her by a previous physician to drink more water which she states has not resolved the issues.

## 2022-04-08 ENCOUNTER — RESULT REVIEW (OUTPATIENT)
Age: 62
End: 2022-04-08

## 2022-04-08 DIAGNOSIS — R82.90 UNSPECIFIED ABNORMAL FINDINGS IN URINE: ICD-10-CM

## 2022-04-08 DIAGNOSIS — R10.2 PELVIC AND PERINEAL PAIN: ICD-10-CM

## 2022-04-08 PROCEDURE — G0463: CPT

## 2022-04-08 PROCEDURE — 76856 US EXAM PELVIC COMPLETE: CPT | Mod: 26

## 2022-04-08 PROCEDURE — 87086 URINE CULTURE/COLONY COUNT: CPT

## 2022-04-08 PROCEDURE — 76856 US EXAM PELVIC COMPLETE: CPT

## 2022-04-08 PROCEDURE — 76830 TRANSVAGINAL US NON-OB: CPT | Mod: 26

## 2022-04-08 PROCEDURE — 76830 TRANSVAGINAL US NON-OB: CPT

## 2022-04-14 ENCOUNTER — NON-APPOINTMENT (OUTPATIENT)
Age: 62
End: 2022-04-14

## 2022-04-21 ENCOUNTER — APPOINTMENT (OUTPATIENT)
Dept: RADIOLOGY | Facility: IMAGING CENTER | Age: 62
End: 2022-04-21
Payer: COMMERCIAL

## 2022-04-21 ENCOUNTER — APPOINTMENT (OUTPATIENT)
Dept: MAMMOGRAPHY | Facility: IMAGING CENTER | Age: 62
End: 2022-04-21
Payer: COMMERCIAL

## 2022-04-21 ENCOUNTER — OUTPATIENT (OUTPATIENT)
Dept: OUTPATIENT SERVICES | Facility: HOSPITAL | Age: 62
LOS: 1 days | End: 2022-04-21
Payer: COMMERCIAL

## 2022-04-21 ENCOUNTER — APPOINTMENT (OUTPATIENT)
Dept: ULTRASOUND IMAGING | Facility: IMAGING CENTER | Age: 62
End: 2022-04-21
Payer: COMMERCIAL

## 2022-04-21 ENCOUNTER — RESULT REVIEW (OUTPATIENT)
Age: 62
End: 2022-04-21

## 2022-04-21 DIAGNOSIS — Z98.890 OTHER SPECIFIED POSTPROCEDURAL STATES: Chronic | ICD-10-CM

## 2022-04-21 DIAGNOSIS — C50.919 MALIGNANT NEOPLASM OF UNSPECIFIED SITE OF UNSPECIFIED FEMALE BREAST: ICD-10-CM

## 2022-04-21 PROCEDURE — 77067 SCR MAMMO BI INCL CAD: CPT | Mod: 26,LT,52

## 2022-04-21 PROCEDURE — 77063 BREAST TOMOSYNTHESIS BI: CPT | Mod: 26,52

## 2022-04-21 PROCEDURE — 77080 DXA BONE DENSITY AXIAL: CPT | Mod: 26

## 2022-04-21 PROCEDURE — 77067 SCR MAMMO BI INCL CAD: CPT

## 2022-04-21 PROCEDURE — 77080 DXA BONE DENSITY AXIAL: CPT

## 2022-04-21 PROCEDURE — 77063 BREAST TOMOSYNTHESIS BI: CPT

## 2022-04-22 ENCOUNTER — NON-APPOINTMENT (OUTPATIENT)
Age: 62
End: 2022-04-22

## 2022-04-22 ENCOUNTER — APPOINTMENT (OUTPATIENT)
Dept: FAMILY MEDICINE | Facility: HOSPITAL | Age: 62
End: 2022-04-22

## 2022-04-25 ENCOUNTER — LABORATORY RESULT (OUTPATIENT)
Age: 62
End: 2022-04-25

## 2022-04-25 ENCOUNTER — APPOINTMENT (OUTPATIENT)
Dept: OBGYN | Facility: CLINIC | Age: 62
End: 2022-04-25
Payer: COMMERCIAL

## 2022-04-25 ENCOUNTER — OUTPATIENT (OUTPATIENT)
Dept: OUTPATIENT SERVICES | Facility: HOSPITAL | Age: 62
LOS: 1 days | End: 2022-04-25
Payer: SELF-PAY

## 2022-04-25 VITALS
WEIGHT: 185 LBS | SYSTOLIC BLOOD PRESSURE: 118 MMHG | BODY MASS INDEX: 31.58 KG/M2 | HEIGHT: 63.98 IN | DIASTOLIC BLOOD PRESSURE: 78 MMHG

## 2022-04-25 DIAGNOSIS — Z98.890 OTHER SPECIFIED POSTPROCEDURAL STATES: Chronic | ICD-10-CM

## 2022-04-25 DIAGNOSIS — N76.0 ACUTE VAGINITIS: ICD-10-CM

## 2022-04-25 DIAGNOSIS — R10.32 LEFT LOWER QUADRANT PAIN: ICD-10-CM

## 2022-04-25 DIAGNOSIS — Z01.419 ENCOUNTER FOR GYNECOLOGICAL EXAMINATION (GENERAL) (ROUTINE) W/OUT ABNORMAL FINDINGS: ICD-10-CM

## 2022-04-25 DIAGNOSIS — Z86.39 PERSONAL HISTORY OF OTHER ENDOCRINE, NUTRITIONAL AND METABOLIC DISEASE: ICD-10-CM

## 2022-04-25 PROCEDURE — 99396 PREV VISIT EST AGE 40-64: CPT | Mod: 25

## 2022-04-25 PROCEDURE — 87591 N.GONORRHOEAE DNA AMP PROB: CPT

## 2022-04-25 PROCEDURE — 87624 HPV HI-RISK TYP POOLED RSLT: CPT

## 2022-04-25 PROCEDURE — G0463: CPT

## 2022-04-25 PROCEDURE — 87491 CHLMYD TRACH DNA AMP PROBE: CPT

## 2022-04-25 NOTE — PHYSICAL EXAM
[Awake] : awake [Alert] : alert [Acute Distress] : no acute distress [Thyroid Nodule] : no thyroid nodule [Goiter] : no goiter [Mass] : no breast mass [Nipple Discharge] : no nipple discharge [Axillary LAD] : no axillary lymphadenopathy [Rt > Lt] : the right breast was larger than the left [Tenderness Of The Right Breast] : no tenderness [Right Breast Absent] : a total mastectomy [___] : a [unfilled] ~Ucm mastectomy scar [Breast Reconstruction Right] : breast reconstruction [Tenderness Of The Left Breast] : no tenderness [Breast Reconstruction Left] : breast reconstruction [No Masses] : no breast masses were palpable [Axillary Lymph Nodes Enlarged Bilaterally] : no enlarged nodes [Soft] : soft [Tender] : non tender [Distended] : not distended [H/Smegaly] : no hepatosplenomegaly [Oriented x3] : oriented to person, place, and time [Depressed Mood] : not depressed [Flat Affect] : affect not flat [Normal] : uterus [No Bleeding] : there was no active vaginal bleeding [Pap Obtained] : a Pap smear was performed [Motion Tenderness] : there was no cervical motion tenderness [Uterine Adnexae] : were not tender and not enlarged [RRR, No Murmurs] : RRR, no murmurs [CTAB] : CTAB [FreeTextEntry5] : stenotic os [FreeTextEntry6] : no pain on exam, no fullness, tenderness or masses palpated

## 2022-04-25 NOTE — HISTORY OF PRESENT ILLNESS
[Lower-Lt-Q] : left lower quadrant [Continuous] : no continuous [Sharp] : sharp [Stabbing] : stabbing [Vomiting] : no vomiting [Diarrhea] : no diarrhea [Vaginal Discharge] : no vaginal discharge [Vaginal Bleeding] : no vaginal bleeding [Pelvic Pressure] : no pelvic pressure [Dysuria] : no dysuria [Pain with BMs] : no pain with bowel movements [Dysmenorrhea] : no dysmenorrhea [Non-opiod Analgesic] : improved by non-opiod analgesic [Lifting] : worsened by lifting [Movement] : worsened by movement [FreeTextEntry6] : fibroid uterus [Post-Menopause, No Sxs] : post-menopausal, currently without symptoms [Menopause Age: ____] : age at menopause was [unfilled] [Sexually Active] : is sexually active [Monogamous] : is monogamous

## 2022-04-25 NOTE — REVIEW OF SYSTEMS
[Abdominal Pain] : abdominal pain [Vomiting] : no vomiting [Constipation] : no constipation [Diarrhea] : no diarrhea [Melena] : no melena [Dysuria] : no dysuria [Incontinence] : no incontinence [Pelvic Pain] : pelvic pain [Abn Vag Bleeding] : no abnormal vaginal bleeding [Frequency] : no frequency [Urgency] : no urgency [Urethral Discharge] : no abnormal urethral discharge [Nl] : Integumentary

## 2022-04-26 LAB
C TRACH RRNA SPEC QL NAA+PROBE: SIGNIFICANT CHANGE UP
HPV HIGH+LOW RISK DNA PNL CVX: SIGNIFICANT CHANGE UP
N GONORRHOEA RRNA SPEC QL NAA+PROBE: SIGNIFICANT CHANGE UP
SPECIMEN SOURCE: SIGNIFICANT CHANGE UP

## 2022-04-27 LAB
BACTERIA UR CULT: NORMAL
CYTOLOGY SPEC DOC CYTO: SIGNIFICANT CHANGE UP

## 2022-05-02 DIAGNOSIS — Z01.419 ENCOUNTER FOR GYNECOLOGICAL EXAMINATION (GENERAL) (ROUTINE) WITHOUT ABNORMAL FINDINGS: ICD-10-CM

## 2022-05-02 DIAGNOSIS — R10.32 LEFT LOWER QUADRANT PAIN: ICD-10-CM

## 2022-05-02 DIAGNOSIS — Z86.39 PERSONAL HISTORY OF OTHER ENDOCRINE, NUTRITIONAL AND METABOLIC DISEASE: ICD-10-CM

## 2022-05-02 DIAGNOSIS — E11.9 TYPE 2 DIABETES MELLITUS WITHOUT COMPLICATIONS: ICD-10-CM

## 2022-05-12 ENCOUNTER — OUTPATIENT (OUTPATIENT)
Dept: OUTPATIENT SERVICES | Facility: HOSPITAL | Age: 62
LOS: 1 days | End: 2022-05-12
Payer: SELF-PAY

## 2022-05-12 ENCOUNTER — APPOINTMENT (OUTPATIENT)
Dept: OBGYN | Facility: CLINIC | Age: 62
End: 2022-05-12
Payer: COMMERCIAL

## 2022-05-12 ENCOUNTER — RESULT REVIEW (OUTPATIENT)
Age: 62
End: 2022-05-12

## 2022-05-12 VITALS — DIASTOLIC BLOOD PRESSURE: 78 MMHG | BODY MASS INDEX: 31.35 KG/M2 | WEIGHT: 182.5 LBS | SYSTOLIC BLOOD PRESSURE: 120 MMHG

## 2022-05-12 DIAGNOSIS — Z98.890 OTHER SPECIFIED POSTPROCEDURAL STATES: Chronic | ICD-10-CM

## 2022-05-12 DIAGNOSIS — N76.0 ACUTE VAGINITIS: ICD-10-CM

## 2022-05-12 DIAGNOSIS — R10.2 PELVIC AND PERINEAL PAIN: ICD-10-CM

## 2022-05-12 PROCEDURE — 99212 OFFICE O/P EST SF 10 MIN: CPT | Mod: 25,GC

## 2022-05-12 PROCEDURE — G0463: CPT

## 2022-05-12 PROCEDURE — 58100 BIOPSY OF UTERUS LINING: CPT | Mod: NC

## 2022-05-12 PROCEDURE — 58100 BIOPSY OF UTERUS LINING: CPT

## 2022-05-12 NOTE — PHYSICAL EXAM
[Chaperone Present] : A chaperone was present in the examining room during all aspects of the physical examination [Appropriately responsive] : appropriately responsive [Alert] : alert [No Acute Distress] : no acute distress [Soft] : soft [Non-tender] : non-tender [Oriented x3] : oriented x3 [Labia Majora] : normal [Labia Minora] : normal [Normal] : normal [Uterine Adnexae] : normal [FreeTextEntry7] : low transverse scar

## 2022-05-12 NOTE — END OF VISIT
[] : Resident [Resident] : Resident [FreeTextEntry3] : Dai Sanchez MD  [FreeTextEntry2] : Was there for entire EMB/ all questions answered \par Dai Sanchez MD

## 2022-05-12 NOTE — PROCEDURE
[Endometrial Biopsy] : Endometrial biopsy [Time out performed] : Pre-procedure time out performed.  Patient's name, date of birth and procedure confirmed. [Consent Obtained] : Consent obtained [Risks] : risks [Benefits] : benefits [Alternatives] : alternatives [Patient] : patient [Infection] : infection [Bleeding] : bleeding [Allergic Reaction] : allergic reaction [Uterine Perforation] : uterine perforation [Pain] : pain [No Premedication] : No premedication [None] : none [Tenaculum] : Tenaculum [Easy Passage] : Easy passage [Scant] : scant [Sent to Pathology] : placed in buffered formalin and sent for pathology [Tolerated Well] : Patient tolerated the procedure well [No Complications] : No complications [de-identified] : preop planning [LMPDate] : 2006

## 2022-05-12 NOTE — DISCUSSION/SUMMARY
[FreeTextEntry1] : Attending  Note\par \par Ms. Hernandez is a 60yo with hx of ER+/AL+/[id number]- infiltrating lobular carcinoma of the breast, HTN, and DM (4/5 A1C= 7.4) presents for surgical consultation in the setting of LLQ pain and small fibroid uterus. Stable in size since 2020. 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 Denies and PMB, boating, bulk sx.\par EMB performed today, uncomplicated, scant tissue obtained\par \par \par (4/2022) TVUS notable for 8cm multifibroid uterus with multiple 1-3cm fibroids. Normal appearing ovaries, (4/2022) cervical cancer screening [place], HPV-. \par \par Reviewed at length hysterectomy, pt desire surgical intervention. Discussed that small fibroid uterus may not be a cause for her pain. Understands that HTN/ DM has to be under control prior to surgical intervention/ Discussed plan for TLHBSO, cystoscopy. reviewed risk of procedure including but not limited to VTE, SSI, damage to bowel, bladder, need for laparotomy. Reviewed post operative recovery. Understands due to physically intensive job may not be able to return to work for 6-8 months/\par Naproxen sent for pain control, discussed to continue and try oral analgesic for pain, will plan for surgery in 3-4 months if medication does not work. Patient in agreement. All questions answered at length\par \par Dai Sanchez MD \par

## 2022-05-17 DIAGNOSIS — R10.2 PELVIC AND PERINEAL PAIN: ICD-10-CM

## 2022-06-21 ENCOUNTER — NON-APPOINTMENT (OUTPATIENT)
Age: 62
End: 2022-06-21

## 2022-07-25 DIAGNOSIS — H00.019 HORDEOLUM EXTERNUM UNSPECIFIED EYE, UNSPECIFIED EYELID: ICD-10-CM

## 2022-09-29 ENCOUNTER — RESULT CHARGE (OUTPATIENT)
Age: 62
End: 2022-09-29

## 2022-09-29 ENCOUNTER — OUTPATIENT (OUTPATIENT)
Dept: OUTPATIENT SERVICES | Facility: HOSPITAL | Age: 62
LOS: 1 days | End: 2022-09-29
Payer: SELF-PAY

## 2022-09-29 ENCOUNTER — APPOINTMENT (OUTPATIENT)
Dept: FAMILY MEDICINE | Facility: HOSPITAL | Age: 62
End: 2022-09-29

## 2022-09-29 VITALS
HEART RATE: 72 BPM | OXYGEN SATURATION: 96 % | SYSTOLIC BLOOD PRESSURE: 137 MMHG | WEIGHT: 176 LBS | HEIGHT: 63.98 IN | RESPIRATION RATE: 14 BRPM | BODY MASS INDEX: 30.05 KG/M2 | TEMPERATURE: 97.5 F | DIASTOLIC BLOOD PRESSURE: 87 MMHG

## 2022-09-29 DIAGNOSIS — R51.9 HEADACHE, UNSPECIFIED: ICD-10-CM

## 2022-09-29 DIAGNOSIS — Z98.890 OTHER SPECIFIED POSTPROCEDURAL STATES: Chronic | ICD-10-CM

## 2022-09-29 DIAGNOSIS — H52.00 HYPERMETROPIA, UNSPECIFIED EYE: ICD-10-CM

## 2022-09-29 DIAGNOSIS — Z00.00 ENCOUNTER FOR GENERAL ADULT MEDICAL EXAMINATION WITHOUT ABNORMAL FINDINGS: ICD-10-CM

## 2022-09-29 DIAGNOSIS — Z23 ENCOUNTER FOR IMMUNIZATION: ICD-10-CM

## 2022-09-29 LAB — HBA1C MFR BLD HPLC: 7.1

## 2022-09-29 PROCEDURE — 80053 COMPREHEN METABOLIC PANEL: CPT

## 2022-09-29 PROCEDURE — 80061 LIPID PANEL: CPT

## 2022-09-29 PROCEDURE — G0008: CPT

## 2022-09-29 PROCEDURE — G0463: CPT

## 2022-09-29 PROCEDURE — 82043 UR ALBUMIN QUANTITATIVE: CPT

## 2022-09-29 PROCEDURE — 36415 COLL VENOUS BLD VENIPUNCTURE: CPT

## 2022-09-29 RX ORDER — ERYTHROMYCIN 20 MG/G
2 GEL TOPICAL TWICE DAILY
Qty: 1 | Refills: 0 | Status: DISCONTINUED | COMMUNITY
Start: 2022-07-25 | End: 2022-09-29

## 2022-09-29 RX ORDER — CETIRIZINE HYDROCHLORIDE 10 MG/1
10 TABLET, FILM COATED ORAL
Qty: 1 | Refills: 0 | Status: DISCONTINUED | COMMUNITY
Start: 2021-12-28 | End: 2022-09-29

## 2022-09-29 RX ORDER — FLUTICASONE PROPIONATE 50 UG/1
50 SPRAY, METERED NASAL DAILY
Qty: 1 | Refills: 0 | Status: DISCONTINUED | COMMUNITY
Start: 2021-12-28 | End: 2022-09-29

## 2022-09-29 RX ORDER — BENZONATATE 150 MG/1
150 CAPSULE ORAL
Qty: 30 | Refills: 0 | Status: DISCONTINUED | COMMUNITY
Start: 2022-06-21 | End: 2022-09-29

## 2022-09-29 RX ORDER — GUAIFENESIN 600 MG/1
600 TABLET, EXTENDED RELEASE ORAL TWICE DAILY
Qty: 14 | Refills: 0 | Status: DISCONTINUED | COMMUNITY
Start: 2022-06-21 | End: 2022-09-29

## 2022-09-29 RX ORDER — LANCETS
EACH MISCELLANEOUS
Qty: 2 | Refills: 3 | Status: ACTIVE | COMMUNITY
Start: 2019-02-25 | End: 1900-01-01

## 2022-09-29 RX ORDER — MECLIZINE HYDROCHLORIDE 25 MG/1
25 TABLET ORAL 4 TIMES DAILY
Qty: 20 | Refills: 0 | Status: DISCONTINUED | COMMUNITY
Start: 2021-12-28 | End: 2022-09-29

## 2022-09-29 RX ORDER — NAPROXEN 500 MG/1
500 TABLET ORAL
Qty: 28 | Refills: 0 | Status: DISCONTINUED | COMMUNITY
Start: 2022-05-12 | End: 2022-09-29

## 2022-09-29 RX ORDER — BLOOD SUGAR DIAGNOSTIC
STRIP MISCELLANEOUS
Qty: 1 | Refills: 3 | Status: ACTIVE | COMMUNITY
Start: 2019-02-25

## 2022-09-29 NOTE — PHYSICAL EXAM
[Normal Sclera/Conjunctiva] : normal sclera/conjunctiva [PERRL] : pupils equal round and reactive to light [Normal Outer Ear/Nose] : the outer ears and nose were normal in appearance [Normal Oropharynx] : the oropharynx was normal [No JVD] : no jugular venous distention [No Lymphadenopathy] : no lymphadenopathy [No Respiratory Distress] : no respiratory distress  [No Accessory Muscle Use] : no accessory muscle use [No Carotid Bruits] : no carotid bruits [No Abdominal Bruit] : a ~M bruit was not heard ~T in the abdomen [No Varicosities] : no varicosities [Soft] : abdomen soft [Non Tender] : non-tender [Non-distended] : non-distended [Normal] : affect was normal and insight and judgment were intact

## 2022-09-30 DIAGNOSIS — R51.9 HEADACHE, UNSPECIFIED: ICD-10-CM

## 2022-09-30 DIAGNOSIS — I10 ESSENTIAL (PRIMARY) HYPERTENSION: ICD-10-CM

## 2022-09-30 DIAGNOSIS — E11.9 TYPE 2 DIABETES MELLITUS WITHOUT COMPLICATIONS: ICD-10-CM

## 2022-09-30 DIAGNOSIS — Z23 ENCOUNTER FOR IMMUNIZATION: ICD-10-CM

## 2022-09-30 PROBLEM — H52.00 FAR-SIGHTED: Status: ACTIVE | Noted: 2022-09-30

## 2022-09-30 LAB
ALBUMIN SERPL ELPH-MCNC: 4.9 G/DL
ALP BLD-CCNC: 93 U/L
ALT SERPL-CCNC: 22 U/L
ANION GAP SERPL CALC-SCNC: 15 MMOL/L
AST SERPL-CCNC: 18 U/L
BILIRUB SERPL-MCNC: 0.4 MG/DL
BUN SERPL-MCNC: 11 MG/DL
CALCIUM SERPL-MCNC: 10.1 MG/DL
CHLORIDE SERPL-SCNC: 101 MMOL/L
CHOLEST SERPL-MCNC: 178 MG/DL
CO2 SERPL-SCNC: 25 MMOL/L
CREAT SERPL-MCNC: 0.54 MG/DL
CREAT SPEC-SCNC: 85 MG/DL
EGFR: 105 ML/MIN/1.73M2
GLUCOSE SERPL-MCNC: 141 MG/DL
HDLC SERPL-MCNC: 78 MG/DL
LDLC SERPL CALC-MCNC: 83 MG/DL
MICROALBUMIN 24H UR DL<=1MG/L-MCNC: 1.7 MG/DL
MICROALBUMIN/CREAT 24H UR-RTO: 19 MG/G
NONHDLC SERPL-MCNC: 100 MG/DL
POTASSIUM SERPL-SCNC: 4.2 MMOL/L
PROT SERPL-MCNC: 7.2 G/DL
SODIUM SERPL-SCNC: 141 MMOL/L
TRIGL SERPL-MCNC: 84 MG/DL

## 2022-09-30 NOTE — HISTORY OF PRESENT ILLNESS
[FreeTextEntry1] : DM-2, HTN  follow-up and 3 months history of headache  [de-identified] : 61 y/o F with PMHx DM II, R breast cancer ( 2003) s/p chemotherapy and right mastectomy, panic attacks on escitalopram, GERD, H pylori infection, LAD myocardial bridge on metoprolol and fibroids came to the clinic for DM-2 follow-up and 3 months history of frontal headache. Stated that since 3 months she is having frequent frontal headaches soon after consuming pasta and bread.Describing pain as pressure and is located in the frontal area. Not associated with visual changes and  lacrimation. Pain is reliving with NSAID use. Stated that during these episodes she is feeling very irritable and loosing patience with people.\par Pt is  far-sighted and not using sceptical. Reported that she strains her eyes while looking at the people

## 2022-09-30 NOTE — REVIEW OF SYSTEMS
[Pain] : pain [Vision Problems] : vision problems [Headache] : headache [Negative] : Heme/Lymph [Redness] : no redness [Dryness] : no dryness  [Itching] : no itching [Dysuria] : no dysuria [Incontinence] : no incontinence [Nocturia] : no nocturia [Hematuria] : no hematuria [Joint Pain] : no joint pain [Joint Stiffness] : no joint stiffness [Dizziness] : no dizziness [Fainting] : no fainting [Confusion] : no confusion [Memory Loss] : no memory loss [Unsteady Walking] : no ataxia

## 2022-09-30 NOTE — HEALTH RISK ASSESSMENT
[Good] : ~his/her~  mood as  good [Never] : Never [No] : No [No falls in past year] : Patient reported no falls in the past year [0] : 2) Feeling down, depressed, or hopeless: Not at all (0) [Reports changes in vision] : Reports changes in vision [Smoke Detector] : smoke detector [Carbon Monoxide Detector] : carbon monoxide detector [de-identified] : no [[id number]] : 0 [Reports changes in hearing] : Reports no changes in hearing [Reports changes in dental health] : Reports no changes in dental health [Guns at Home] : no guns at home

## 2022-10-05 ENCOUNTER — RESULT REVIEW (OUTPATIENT)
Age: 62
End: 2022-10-05

## 2022-10-05 ENCOUNTER — APPOINTMENT (OUTPATIENT)
Dept: MRI IMAGING | Facility: HOSPITAL | Age: 62
End: 2022-10-05

## 2022-10-05 ENCOUNTER — OUTPATIENT (OUTPATIENT)
Dept: OUTPATIENT SERVICES | Facility: HOSPITAL | Age: 62
LOS: 1 days | End: 2022-10-05
Payer: SELF-PAY

## 2022-10-05 DIAGNOSIS — R51.9 HEADACHE, UNSPECIFIED: ICD-10-CM

## 2022-10-05 DIAGNOSIS — Z98.890 OTHER SPECIFIED POSTPROCEDURAL STATES: Chronic | ICD-10-CM

## 2022-10-05 PROCEDURE — 70544 MR ANGIOGRAPHY HEAD W/O DYE: CPT | Mod: 26,59

## 2022-10-05 PROCEDURE — 70551 MRI BRAIN STEM W/O DYE: CPT

## 2022-10-05 PROCEDURE — 70551 MRI BRAIN STEM W/O DYE: CPT | Mod: 26

## 2022-10-05 PROCEDURE — 70544 MR ANGIOGRAPHY HEAD W/O DYE: CPT

## 2022-10-13 ENCOUNTER — APPOINTMENT (OUTPATIENT)
Dept: FAMILY MEDICINE | Facility: HOSPITAL | Age: 62
End: 2022-10-13

## 2022-11-02 ENCOUNTER — NON-APPOINTMENT (OUTPATIENT)
Age: 62
End: 2022-11-02

## 2022-11-02 ENCOUNTER — OUTPATIENT (OUTPATIENT)
Dept: OUTPATIENT SERVICES | Facility: HOSPITAL | Age: 62
LOS: 1 days | End: 2022-11-02
Payer: SELF-PAY

## 2022-11-02 ENCOUNTER — APPOINTMENT (OUTPATIENT)
Dept: FAMILY MEDICINE | Facility: HOSPITAL | Age: 62
End: 2022-11-02

## 2022-11-02 VITALS
OXYGEN SATURATION: 99 % | SYSTOLIC BLOOD PRESSURE: 129 MMHG | WEIGHT: 177 LBS | RESPIRATION RATE: 17 BRPM | BODY MASS INDEX: 30.4 KG/M2 | TEMPERATURE: 98 F | HEART RATE: 95 BPM | DIASTOLIC BLOOD PRESSURE: 73 MMHG

## 2022-11-02 VITALS
RESPIRATION RATE: 16 BRPM | OXYGEN SATURATION: 100 % | TEMPERATURE: 97.8 F | HEART RATE: 75 BPM | DIASTOLIC BLOOD PRESSURE: 79 MMHG | SYSTOLIC BLOOD PRESSURE: 122 MMHG

## 2022-11-02 DIAGNOSIS — Z98.890 OTHER SPECIFIED POSTPROCEDURAL STATES: Chronic | ICD-10-CM

## 2022-11-02 DIAGNOSIS — H65.91 UNSPECIFIED NONSUPPURATIVE OTITIS MEDIA, RIGHT EAR: ICD-10-CM

## 2022-11-02 DIAGNOSIS — Z00.00 ENCOUNTER FOR GENERAL ADULT MEDICAL EXAMINATION WITHOUT ABNORMAL FINDINGS: ICD-10-CM

## 2022-11-02 PROCEDURE — G0463: CPT

## 2022-11-03 PROBLEM — H65.91 FLUID LEVEL BEHIND TYMPANIC MEMBRANE OF RIGHT EAR: Status: RESOLVED | Noted: 2021-12-28 | Resolved: 2022-11-03

## 2022-11-04 NOTE — REVIEW OF SYSTEMS
[Fever] : no fever [Chills] : no chills [Fatigue] : no fatigue [Hot Flashes] : no hot flashes [Night Sweats] : no night sweats [Recent Change In Weight] : ~T no recent weight change [Hearing Loss] : no hearing loss [Nosebleed] : no nosebleeds [Hoarseness] : no hoarseness [Nasal Discharge] : no nasal discharge [Sore Throat] : no sore throat [Postnasal Drip] : no postnasal drip [Chest Pain] : no chest pain [Palpitations] : no palpitations [Lower Ext Edema] : no lower extremity edema [Orthopnea] : no orthopnea [Paroxysmal Nocturnal Dyspnea] : no paroxysmal nocturnal dyspnea [Shortness Of Breath] : no shortness of breath [Wheezing] : no wheezing [Cough] : no cough [Dyspnea on Exertion] : no dyspnea on exertion [Abdominal Pain] : no abdominal pain [Nausea] : no nausea [Constipation] : no constipation [Diarrhea] : diarrhea [Vomiting] : no vomiting [Heartburn] : no heartburn [Melena] : no melena [Headache] : no headache [FreeTextEntry4] : L ear pain, liquid in L ear

## 2022-11-04 NOTE — HISTORY OF PRESENT ILLNESS
[FreeTextEntry8] : 61 y/o F presents with c/o of L ear pain. Patient states has been having L ear pain in external part of ear and sensation of fluid in ear. She stated symptoms started yesterday after showering. Pt does use qtips to clean out ear. She mentions was in clinic 1 month ago and prescribed oral ciprofloxacin 500 mg course and erythromycin drops, states did not finish abx course. Denies hearing loss, fever, chills, runny nose, sore throat, nasal congestion, cough,

## 2022-11-04 NOTE — PHYSICAL EXAM
[Normal Oropharynx] : the oropharynx was normal [Normal TMs] : both tympanic membranes were normal [Normal Nasal Mucosa] : the nasal mucosa was normal [Normal] : soft, non-tender, non-distended, no masses palpated, no HSM and normal bowel sounds [de-identified] : + L ear clear effusion noted, + TTP on L tragus.

## 2022-11-05 DIAGNOSIS — H92.09 OTALGIA, UNSPECIFIED EAR: ICD-10-CM

## 2022-12-23 ENCOUNTER — EMERGENCY (EMERGENCY)
Facility: HOSPITAL | Age: 62
LOS: 1 days | Discharge: HOME | End: 2022-12-23
Attending: STUDENT IN AN ORGANIZED HEALTH CARE EDUCATION/TRAINING PROGRAM | Admitting: STUDENT IN AN ORGANIZED HEALTH CARE EDUCATION/TRAINING PROGRAM
Payer: MEDICAID

## 2022-12-23 ENCOUNTER — OUTPATIENT (OUTPATIENT)
Dept: OUTPATIENT SERVICES | Facility: HOSPITAL | Age: 62
LOS: 1 days | End: 2022-12-23
Payer: SELF-PAY

## 2022-12-23 ENCOUNTER — APPOINTMENT (OUTPATIENT)
Dept: FAMILY MEDICINE | Facility: HOSPITAL | Age: 62
End: 2022-12-23

## 2022-12-23 ENCOUNTER — RESULT CHARGE (OUTPATIENT)
Age: 62
End: 2022-12-23

## 2022-12-23 VITALS
OXYGEN SATURATION: 96 % | TEMPERATURE: 97 F | HEART RATE: 106 BPM | WEIGHT: 175.05 LBS | HEIGHT: 65 IN | RESPIRATION RATE: 18 BRPM | DIASTOLIC BLOOD PRESSURE: 87 MMHG | SYSTOLIC BLOOD PRESSURE: 170 MMHG

## 2022-12-23 DIAGNOSIS — Z11.52 ENCOUNTER FOR SCREENING FOR COVID-19: ICD-10-CM

## 2022-12-23 DIAGNOSIS — Z00.00 ENCOUNTER FOR GENERAL ADULT MEDICAL EXAMINATION WITHOUT ABNORMAL FINDINGS: ICD-10-CM

## 2022-12-23 DIAGNOSIS — Z98.890 OTHER SPECIFIED POSTPROCEDURAL STATES: Chronic | ICD-10-CM

## 2022-12-23 PROCEDURE — 93010 ELECTROCARDIOGRAM REPORT: CPT

## 2022-12-23 PROCEDURE — 99284 EMERGENCY DEPT VISIT MOD MDM: CPT

## 2022-12-23 PROCEDURE — 93005 ELECTROCARDIOGRAM TRACING: CPT

## 2022-12-23 PROCEDURE — 87631 RESP VIRUS 3-5 TARGETS: CPT

## 2022-12-23 PROCEDURE — 87635 SARS-COV-2 COVID-19 AMP PRB: CPT

## 2022-12-23 PROCEDURE — 99283 EMERGENCY DEPT VISIT LOW MDM: CPT

## 2022-12-23 NOTE — ED PROVIDER NOTE - CONSTITUTIONAL, MLM
Patient tolerated procedure well. normal... Well appearing, awake, alert, oriented to person, place, time/situation and in no apparent distress.

## 2022-12-23 NOTE — ED PROVIDER NOTE - OBJECTIVE STATEMENT
62-year-old female diabetic on metformin presents with 1 week of general malaise cough chills chest pain with coughing and shortness of breath with coughing.  Tested positive for COVID today.  Called her primary care doctor and they told her to get evaluated in the ER.  Denies recorded temperatures nausea vomiting diarrhea she is tolerating p.o. well.  No urinary symptoms.  No LOC or fainting.  No sick contacts at home.

## 2022-12-23 NOTE — ED PROVIDER NOTE - NSICDXPASTSURGICALHX_GEN_ALL_CORE_FT
PAST SURGICAL HISTORY:  History of Appendectomy     History of Right Mastectomy     S/P abdominoplasty     S/P Breast Reconstruction 2006    S/P Sclerotherapy of Varicose Veins

## 2022-12-23 NOTE — ED ADULT NURSE NOTE - OBJECTIVE STATEMENT
Patient came from home with complaint of SOB, RA 96%. Patient states to have recently been diagnosed with COVID. Patient has been having weakness, cough and cold for the past week. Denies any nausea, vomit, fever or chills. PMH of DM.

## 2022-12-23 NOTE — ED PROVIDER NOTE - PATIENT PORTAL LINK FT
You can access the FollowMyHealth Patient Portal offered by [facility] by registering at the following website: [email or web address]. By joining [facility]’s FollowMyHealth portal, you will also be able to view your health information using other applications (apps) compatible with our system.

## 2022-12-23 NOTE — ED PROVIDER NOTE - NSFOLLOWUPINSTRUCTIONS_ED_ALL_ED_FT
[facility] Internal [facility]  General Internal Medicine  [address] [place] [address]  Phone: [phone]  Fax:     [facility]  Internal Medicine  [address] [place] [address]  Phone: [phone]  Fax: [phone] Syndrome    WHAT YOU NEED TO KNOW:    Viral syndrome is a term used for symptoms of an infection caused by a virus. Viruses are spread easily from person to person through the air and on shared items.    DISCHARGE INSTRUCTIONS:    Call your local emergency number (911 in the ) or have someone else call if:    You have a seizure.    You cannot be woken.    You have chest pain or trouble breathing.  Seek care immediately if:    You have a stiff neck, a bad headache, and sensitivity to light.    You feel weak, dizzy, or confused.    You stop urinating or urinate a lot less than usual.    You cough up blood or thick yellow or green mucus.    You have severe abdominal pain or your abdomen is larger than usual.  Call your doctor if:    Your symptoms do not get better with treatment or get worse after 3 days.    You have a rash or ear pain.    You have burning when you urinate.    You have questions or concerns about your condition or care.  Medicines: You may need any of the following:    Acetaminophen decreases pain and fever. It is available without a doctor's order. Ask how much to take and how often to take it. Follow directions. Read the labels of all other medicines you are using to see if they also contain acetaminophen, or ask your doctor or pharmacist. Acetaminophen can cause liver damage if not taken correctly. Do not use more than 4 grams (4,000 milligrams) total of acetaminophen in one day.    NSAIDs, such as ibuprofen, help decrease swelling, pain, and fever. NSAIDs can cause stomach bleeding or kidney problems in certain people. If you take blood thinner medicine, always ask your healthcare provider if NSAIDs are safe for you. Always read the medicine label and follow directions.    Cold medicine helps decrease swelling, control a cough, and relieve chest or nasal congestion.    Saline nasal spray helps decrease nasal congestion.    Take your medicine as directed. Contact your healthcare provider if you think your medicine is not helping or if you have side effects. Tell him of her if you are allergic to any medicine. Keep a list of the medicines, vitamins, and herbs you take. Include the amounts, and when and why you take them. Bring the list or the pill bottles to follow-up visits. Carry your medicine list with you in case of an emergency.  Manage your symptoms:    Drink liquids as directed to prevent dehydration. Ask how much liquid to drink each day and which liquids are best for you. Ask if you should drink an oral rehydration solution (ORS). An ORS has the right amounts of water, salts, and sugar you need to replace body fluids. This may help prevent dehydration caused by vomiting or diarrhea. Do not drink liquids with caffeine. Liquids with caffeine can make dehydration worse.    Get plenty of rest to help your body heal. Take naps throughout the day. Ask your healthcare provider when you can return to work and your normal activities.    Use a cool mist humidifier to help you breathe easier. Ask your healthcare provider how to use a cool mist humidifier.    Eat honey or use cough drops for a sore throat. Cough drops are available without a doctor's order. Follow directions for taking cough drops.    Do not smoke or be close to anyone who is smoking. Nicotine and other chemicals in cigarettes and cigars can cause lung damage. Smoking can also delay healing. Ask your healthcare provider for information if you currently smoke and need help to quit. E-cigarettes or smokeless tobacco still contain nicotine. Talk to your healthcare provider before you use these products.  Prevent the spread of germs:      Wash your hands often. Wash your hands several times each day. Wash after you use the bathroom, change a child's diaper, and before you prepare or eat food. Use soap and water every time. Rub your soapy hands together, lacing your fingers. Wash the front and back of your hands, and in between your fingers. Use the fingers of one hand to scrub under the fingernails of the other hand. Wash for at least 20 seconds. Rinse with warm, running water for several seconds. Then dry your hands with a clean towel or paper towel. Use hand  that contains alcohol if soap and water are not available. Do not touch your eyes, nose, or mouth without washing your hands first.  Handwashing      Cover a sneeze or cough. Use a tissue that covers your mouth and nose. Throw the tissue away in a trash can right away. Use the bend of your arm if a tissue is not available. Wash your hands well with soap and water or use a hand .    Stay away from others while you are sick. Avoid crowds as much as possible.    Ask about vaccines you may need. Talk to your healthcare provider about your vaccine history. He or she will tell you which vaccines you need, and when to get them.  Get the influenza (flu) vaccine as soon as recommended each year. The flu vaccine is available starting in September or October. Flu viruses change, so it is important to get a flu vaccine every year.    Get the pneumonia vaccine if recommended. This vaccine is usually recommended every 5 years. Your provider will tell you when to get this vaccine, if needed.  Follow up with your doctor as directed: Write down your questions so you remember to ask them during your visits.    Síndrome viral    LO QUE NECESITA SABER:    El síndrome viral es un término usado para los síntomas de daniel infección causada por un virus. Los virus son propagados fácilmente de daniel persona a otra a través del aire y mediante los objetos que se comparten.    INSTRUCCIONES SOBRE EL GUSTABO HOSPITALARIA:    Llame al número local de emergencias (911 en los [place]), o pídale a alguien que llame si:    Usted sufre daniel convulsión.    No es posible despertarlo.    Usted tiene dolor torácico y dificultad para respirar.  Busque atención médica de inmediato si:    Usted tiene rigidez en el ashok, dolor de maria guadalupe intenso y sensibilidad a la yesica.    Usted se siente débil, mareado o confundido.    Usted chad de orinar u orina menos de lo habitual.    Usted tose que o daniel mucosidad espesa amarilla o lazarus.    Usted tiene dolor abdominal severo o cast abdomen está más leny de lo habitual.  Llame a cast médico si:    Esperanza síntomas no mejoran con el tratamiento o empeoran después de 3 días.    Usted tiene salpullido o dolor de oído.    Usted siente ardor al orinar.    Usted tiene preguntas o inquietudes acerca de cast condición o cuidado.  Medicamentos:Es posible que usted necesite alguno de los siguientes:    Acetaminofénalivia el dolor y baja la fiebre. Está disponible sin receta médica. Pregunte la cantidad y la frecuencia con que debe tomarlos. Siga las indicaciones. Marques las etiquetas de todos los demás medicamentos que esté usando para saber si también contienen acetaminofén, o pregunte a cast médico o farmacéutico. El acetaminofén puede causar daño en el hígado cuando no se glenn de forma correcta. No use más de 4 gramos (4000 miligramos) en total de acetaminofeno en un día.    Los ZINA,paco el ibuprofeno, ayudan a disminuir la inflamación, el dolor y la fiebre. Los ZINA pueden causar sangrado estomacal o problemas renales en ciertas personas. Si usted glenn un medicamento anticoagulante, siempre pregúntele a cast médico si los ZINA son seguros para usted. Siempre marques la etiqueta de james medicamento y siga las instrucciones.    El medicamento para el resfriadoayuda a disminuir la inflamación, a controlar la tos y a aliviar la congestión del pecho o nasal.    El rociador nasal de agua salinaayuda a disminuir la congestión nasal.    [place] esperanza medicamentos paco se le haya indicado.Consulte con cast médico si usted oliva que cast medicamento no le está ayudando o si presenta efectos secundarios. Infórmele si es alérgico a algún medicamento. Mantenga daniel lista actualizada de los medicamentos, las vitaminas y los productos herbales que glenn. Incluya los siguientes datos de los medicamentos: cantidad, frecuencia y motivo de administración. Traiga con usted la lista o los envases de las píldoras a esperanza citas de seguimiento. Lleve la lista de los medicamentos con usted en dimas de daniel emergencia.  El manejo de esperanza síntomas:    [place] líquidos paco se le indique para evitar daniel deshidratación.Pregunte cuánto líquido debe shawna cada día y cuáles líquidos son los más adecuados para usted. Pregunte si usted debería shawna daniel solución de rehidratación oral (SRO). Daniel SRO tiene las cantidades exactas de agua, sal y azúcar que usted necesita para reemplazar los líquidos corporales. Ely podría ayudarlo a evitar la deshidratación a causa del vómito y de la diarrea. No tome líquidos con cafeína. Los líquidos con cafeína pueden empeorar la deshidratación.    Descanse lo suficiente para ayudar a que cast cuerpo sane.[place] siestas steven el día. Pregunte a cast médico cuándo puede regresar a cast trabajo y a esperanza actividades cotidianas.    Use un humidificador de isaiah fría para respirar más fácilmente.Pregunte a cast médico cómo usar un humidificador de vapor frío.    Coma miel o use pastillas para la tos para el dolor de garganta.Los caramelos para la tos están disponibles sin receta médica. Siga las indicaciones para shawna los caramelos para la tos.    No fume ni esté cerca a alguien que esté fumando.La nicotina y otras sustancias químicas que contienen los cigarrillos y cigarros pueden dañar los pulmones. El fumar también puede retrasar la sanación. Pida información a cast médico si usted actualmente fuma y necesita ayuda para dejar de fumar. Los cigarrillos electrónicos o el tabaco sin humo igualmente contienen nicotina. Consulte con cast médico antes de utilizar estos productos.  Prevenga la propagación de gérmenes:      Lávese las jeison frecuentemente.Lávese las jeison varias veces al día. Lávese después de usar el baño, después de cambiar pañales y antes de preparar la comida o comer. Use siempre agua y jabón. Frótese las jeison enjabonadas, entrelazando los dedos. Lávese el frente y el dorso de las jeison, y entre los dedos. Use los dedos de daniel mano para restregar debajo de las uñas de la otra mano. Lávese steven al menos 20 segundos. Enjuague con agua corriente caliente steven varios segundos. Luego séquese las jeison con daniel toalla limpia o daniel toalla de papel. Puede usar un desinfectante para jeison que contenga alcohol, si no hay agua y jabón disponibles. No se toque los ojos, la nariz o la boca sin antes lavarse las jeison.  Lavado de jeison      Cúbrase al toser o estornudar.Use un pañuelo que cubra la boca y la nariz. Arroje el pañuelo a la basura de inmediato. Use el ángulo del brazo si no tiene un pañuelo disponible. Lávese las jeison con agua y jabón o use un desinfectante de jeison.    Manténgase alejado de los demás mientras esté enfermo.Evite las multitudes lo más que pueda.    Pregunte sobre las vacunas que pudiera necesitar.Hable con cast médico sobre cast historial de vacunación. Cast médico le indicará qué vacunas necesita y cuándo recibirlas.  Vacúnese contra la influenza (gripe) tan pronto paco se recomiende cada año.La vacuna antigripal se ofrece a partir de septiembre u octubre. Los virus de la gripe cambian, por lo que es importante vacunarse contra la gripe cada año.    Vacúnese contra la neumonía si se recomienda.Esta vacuna generalmente se recomienda cada 5 años. Cast médico le indicará cuándo recibir esta vacuna, de ser necesaria.  Acuda a la consulta de control con cast médico según las indicaciones:Anote esperanza preguntas para que se acuerde de hacerlas steven esperanza visitas.

## 2022-12-23 NOTE — ED PROVIDER NOTE - CLINICAL SUMMARY MEDICAL DECISION MAKING FREE TEXT BOX
62-year-old female diabetic comes in COVID-positive.  Has been symptomatic for over 5 days.  Symptoms are consistent with her viral illness.  Including cough shortness of breath chest pain with coughing chills general malaise.  Vitals are normal no hypoxia.  No respiratory distress on exam.  Lungs are clear in all fields.  No chest pain currently.  Patient is coughing in the room.  No signs of DVT on exam.  Twelve-lead is nonischemic.  There is 1 nonspecific T wave inversion in lead III.  Will advise patient discharge hydration at home and rest and return for new or worsening symptoms.  Advised to get a pulse oximeter to check her oxygen at home if she becomes hypoxic and return to the ED as well.  Can also follow with primary care doctor in the office.

## 2022-12-23 NOTE — HISTORY OF PRESENT ILLNESS
[Home] : at home, [unfilled] , at the time of the visit. [Medical Office: ([facility])___] : at the medical office located in  [Verbal consent obtained from patient] : the patient, [unfilled] [Time Spent: ___ minutes] : I have spent [unfilled] minutes with the patient on the telephone

## 2022-12-23 NOTE — ED PROVIDER NOTE - NSICDXPASTMEDICALHX_GEN_ALL_CORE_FT
PAST MEDICAL HISTORY:  Breast Cancer 2006 right mastectomy,  followed by chemo    CAD (coronary artery disease)     Diabetes Mellitus Type II     High Cholesterol     HTN (hypertension)

## 2022-12-23 NOTE — ED PROVIDER NOTE - NSICDXFAMILYHX_GEN_ALL_CORE_FT
FAMILY HISTORY:  Mother  Still living? No  Family history of colon cancer, Age at diagnosis: Age Unknown

## 2022-12-27 DIAGNOSIS — Z11.52 ENCOUNTER FOR SCREENING FOR COVID-19: ICD-10-CM

## 2022-12-28 ENCOUNTER — NON-APPOINTMENT (OUTPATIENT)
Age: 62
End: 2022-12-28

## 2022-12-30 ENCOUNTER — APPOINTMENT (OUTPATIENT)
Dept: FAMILY MEDICINE | Facility: HOSPITAL | Age: 62
End: 2022-12-30

## 2023-01-09 LAB
FLU A RESULT: NOT DETECTED
FLU B RESULT: NOT DETECTED
RSV RESULT: NOT DETECTED

## 2023-01-11 ENCOUNTER — OUTPATIENT (OUTPATIENT)
Dept: OUTPATIENT SERVICES | Facility: HOSPITAL | Age: 63
LOS: 1 days | End: 2023-01-11
Payer: SELF-PAY

## 2023-01-11 ENCOUNTER — APPOINTMENT (OUTPATIENT)
Dept: FAMILY MEDICINE | Facility: HOSPITAL | Age: 63
End: 2023-01-11

## 2023-01-11 VITALS
BODY MASS INDEX: 31.36 KG/M2 | DIASTOLIC BLOOD PRESSURE: 82 MMHG | OXYGEN SATURATION: 95 % | SYSTOLIC BLOOD PRESSURE: 123 MMHG | WEIGHT: 177 LBS | RESPIRATION RATE: 17 BRPM | TEMPERATURE: 98.6 F | HEART RATE: 80 BPM | HEIGHT: 63 IN

## 2023-01-11 DIAGNOSIS — Z00.00 ENCOUNTER FOR GENERAL ADULT MEDICAL EXAMINATION WITHOUT ABNORMAL FINDINGS: ICD-10-CM

## 2023-01-11 DIAGNOSIS — Z98.890 OTHER SPECIFIED POSTPROCEDURAL STATES: Chronic | ICD-10-CM

## 2023-01-11 PROCEDURE — G0463: CPT

## 2023-01-12 ENCOUNTER — APPOINTMENT (OUTPATIENT)
Dept: FAMILY MEDICINE | Facility: HOSPITAL | Age: 63
End: 2023-01-12

## 2023-01-12 DIAGNOSIS — M25.511 PAIN IN RIGHT SHOULDER: ICD-10-CM

## 2023-01-12 DIAGNOSIS — R00.2 PALPITATIONS: ICD-10-CM

## 2023-01-25 ENCOUNTER — OUTPATIENT (OUTPATIENT)
Dept: OUTPATIENT SERVICES | Facility: HOSPITAL | Age: 63
LOS: 1 days | End: 2023-01-25
Payer: SELF-PAY

## 2023-01-25 ENCOUNTER — APPOINTMENT (OUTPATIENT)
Dept: FAMILY MEDICINE | Facility: HOSPITAL | Age: 63
End: 2023-01-25

## 2023-01-25 VITALS
OXYGEN SATURATION: 96 % | DIASTOLIC BLOOD PRESSURE: 84 MMHG | RESPIRATION RATE: 16 BRPM | HEART RATE: 68 BPM | BODY MASS INDEX: 31.89 KG/M2 | WEIGHT: 180 LBS | TEMPERATURE: 97.8 F | SYSTOLIC BLOOD PRESSURE: 138 MMHG

## 2023-01-25 DIAGNOSIS — Z00.00 ENCOUNTER FOR GENERAL ADULT MEDICAL EXAMINATION WITHOUT ABNORMAL FINDINGS: ICD-10-CM

## 2023-01-25 DIAGNOSIS — Z98.890 OTHER SPECIFIED POSTPROCEDURAL STATES: Chronic | ICD-10-CM

## 2023-01-25 PROCEDURE — G0463: CPT

## 2023-01-26 DIAGNOSIS — M25.511 PAIN IN RIGHT SHOULDER: ICD-10-CM

## 2023-01-30 ENCOUNTER — OUTPATIENT (OUTPATIENT)
Dept: OUTPATIENT SERVICES | Facility: HOSPITAL | Age: 63
LOS: 1 days | Discharge: HOME | End: 2023-01-30

## 2023-01-30 DIAGNOSIS — Z98.890 OTHER SPECIFIED POSTPROCEDURAL STATES: Chronic | ICD-10-CM

## 2023-01-30 DIAGNOSIS — C50.919 MALIGNANT NEOPLASM OF UNSPECIFIED SITE OF UNSPECIFIED FEMALE BREAST: ICD-10-CM

## 2023-02-06 ENCOUNTER — APPOINTMENT (OUTPATIENT)
Dept: HEMATOLOGY ONCOLOGY | Facility: CLINIC | Age: 63
End: 2023-02-06

## 2023-02-07 NOTE — PHYSICAL EXAM
[Normal] : no rash [de-identified] : R shulder + lift off test, limited external rotation. Tenderness to deltoid area.

## 2023-02-07 NOTE — PLAN
[FreeTextEntry1] : #R shoulder pain\par -c/w with warm compresses, diclofenac gel \par -trial of Naproxen 500 mg BID x 5 days, advised to take with food \par -PT referral \par -Ortho referral to evaluate for joint injection if pain persists \par -no imaging at this time \par \par \par Reassess in 2 weeks \par \par d/w Dr Rosas

## 2023-02-07 NOTE — HISTORY OF PRESENT ILLNESS
[FreeTextEntry1] : R shoulder pain  [de-identified] : 63 yo F presents with persistent R shoulder pain x 12 days now radiating to 3rd R digit. Pt reports being in clinic 10 days ago, advised on gel and warm compresses but reports pain still persisting. Pain is constant and affecting sleep. 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

## 2023-02-09 ENCOUNTER — APPOINTMENT (OUTPATIENT)
Dept: OPHTHALMOLOGY | Facility: CLINIC | Age: 63
End: 2023-02-09
Payer: COMMERCIAL

## 2023-02-09 ENCOUNTER — NON-APPOINTMENT (OUTPATIENT)
Age: 63
End: 2023-02-09

## 2023-02-09 PROCEDURE — 92014 COMPRE OPH EXAM EST PT 1/>: CPT

## 2023-02-09 PROCEDURE — 92025 CPTRIZED CORNEAL TOPOGRAPHY: CPT

## 2023-02-11 ENCOUNTER — APPOINTMENT (OUTPATIENT)
Dept: FAMILY MEDICINE | Facility: HOSPITAL | Age: 63
End: 2023-02-11

## 2023-02-15 ENCOUNTER — RESULT REVIEW (OUTPATIENT)
Age: 63
End: 2023-02-15

## 2023-02-15 ENCOUNTER — APPOINTMENT (OUTPATIENT)
Dept: FAMILY MEDICINE | Facility: HOSPITAL | Age: 63
End: 2023-02-15

## 2023-02-15 ENCOUNTER — OUTPATIENT (OUTPATIENT)
Dept: OUTPATIENT SERVICES | Facility: HOSPITAL | Age: 63
LOS: 1 days | End: 2023-02-15
Payer: SELF-PAY

## 2023-02-15 VITALS
BODY MASS INDEX: 32.24 KG/M2 | DIASTOLIC BLOOD PRESSURE: 77 MMHG | TEMPERATURE: 98.6 F | HEART RATE: 90 BPM | OXYGEN SATURATION: 96 % | WEIGHT: 182 LBS | RESPIRATION RATE: 16 BRPM | SYSTOLIC BLOOD PRESSURE: 127 MMHG

## 2023-02-15 DIAGNOSIS — Z00.00 ENCOUNTER FOR GENERAL ADULT MEDICAL EXAMINATION WITHOUT ABNORMAL FINDINGS: ICD-10-CM

## 2023-02-15 DIAGNOSIS — M77.8 OTHER ENTHESOPATHIES, NOT ELSEWHERE CLASSIFIED: ICD-10-CM

## 2023-02-15 PROCEDURE — G0463: CPT

## 2023-02-15 NOTE — PHYSICAL EXAM
[Normal] : soft, non-tender, non-distended, no masses palpated, no HSM and normal bowel sounds [Normal Gait] : normal gait [Normal Affect] : the affect was normal [de-identified] : +pain on internal rotation of RUE +5/5 motor strength BL UE

## 2023-02-15 NOTE — HISTORY OF PRESENT ILLNESS
[FreeTextEntry1] : right shoulder pain [de-identified] : 62 year old F presenting with right shoulder pain that is sharp, constant and rated 9-10/10 for last several weeks. Patient reports difficulty with internal rotation of right arm. Pain is refractory to physical therapy, diclofenac gel, Tylenol/Motrin and Naproxen. She has been going to Physical therapy twice weekly for last 3 weeks. She reports pain is radiating to the right third digit. Denies any alleviating factors.

## 2023-02-15 NOTE — REVIEW OF SYSTEMS
[Joint Pain] : joint pain [Negative] : Gastrointestinal [Joint Stiffness] : no joint stiffness [Joint Swelling] : no joint swelling [Muscle Weakness] : no muscle weakness [Headache] : no headache [Dizziness] : no dizziness

## 2023-02-17 ENCOUNTER — APPOINTMENT (OUTPATIENT)
Dept: CARDIOLOGY | Facility: HOSPITAL | Age: 63
End: 2023-02-17

## 2023-02-17 DIAGNOSIS — M75.80 OTHER SHOULDER LESIONS, UNSPECIFIED SHOULDER: ICD-10-CM

## 2023-02-17 DIAGNOSIS — M77.8 OTHER ENTHESOPATHIES, NOT ELSEWHERE CLASSIFIED: ICD-10-CM

## 2023-02-17 DIAGNOSIS — M25.511 PAIN IN RIGHT SHOULDER: ICD-10-CM

## 2023-02-22 ENCOUNTER — OUTPATIENT (OUTPATIENT)
Dept: OUTPATIENT SERVICES | Facility: HOSPITAL | Age: 63
LOS: 1 days | End: 2023-02-22
Payer: SELF-PAY

## 2023-02-22 ENCOUNTER — APPOINTMENT (OUTPATIENT)
Dept: CARDIOLOGY | Facility: HOSPITAL | Age: 63
End: 2023-02-22

## 2023-02-22 ENCOUNTER — NON-APPOINTMENT (OUTPATIENT)
Age: 63
End: 2023-02-22

## 2023-02-22 ENCOUNTER — OUTPATIENT (OUTPATIENT)
Dept: OUTPATIENT SERVICES | Facility: HOSPITAL | Age: 63
LOS: 1 days | End: 2023-02-22

## 2023-02-22 VITALS — HEART RATE: 86 BPM | OXYGEN SATURATION: 96 % | SYSTOLIC BLOOD PRESSURE: 108 MMHG | DIASTOLIC BLOOD PRESSURE: 75 MMHG

## 2023-02-22 DIAGNOSIS — I25.10 ATHEROSCLEROTIC HEART DISEASE OF NATIVE CORONARY ARTERY WITHOUT ANGINA PECTORIS: ICD-10-CM

## 2023-02-22 DIAGNOSIS — Z98.890 OTHER SPECIFIED POSTPROCEDURAL STATES: Chronic | ICD-10-CM

## 2023-02-22 DIAGNOSIS — R00.2 PALPITATIONS: ICD-10-CM

## 2023-02-22 DIAGNOSIS — Z00.8 ENCOUNTER FOR OTHER GENERAL EXAMINATION: ICD-10-CM

## 2023-02-22 DIAGNOSIS — M77.8 OTHER ENTHESOPATHIES, NOT ELSEWHERE CLASSIFIED: ICD-10-CM

## 2023-02-22 PROCEDURE — G0463: CPT

## 2023-02-22 PROCEDURE — 93005 ELECTROCARDIOGRAM TRACING: CPT

## 2023-02-28 ENCOUNTER — APPOINTMENT (OUTPATIENT)
Dept: ORTHOPEDIC SURGERY | Facility: HOSPITAL | Age: 63
End: 2023-02-28

## 2023-03-01 ENCOUNTER — APPOINTMENT (OUTPATIENT)
Dept: HEMATOLOGY ONCOLOGY | Facility: CLINIC | Age: 63
End: 2023-03-01
Payer: COMMERCIAL

## 2023-03-01 ENCOUNTER — OUTPATIENT (OUTPATIENT)
Dept: OUTPATIENT SERVICES | Facility: HOSPITAL | Age: 63
LOS: 1 days | End: 2023-03-01
Payer: SELF-PAY

## 2023-03-01 ENCOUNTER — APPOINTMENT (OUTPATIENT)
Dept: MRI IMAGING | Facility: HOSPITAL | Age: 63
End: 2023-03-01
Payer: COMMERCIAL

## 2023-03-01 VITALS
DIASTOLIC BLOOD PRESSURE: 83 MMHG | TEMPERATURE: 97.3 F | BODY MASS INDEX: 32.02 KG/M2 | WEIGHT: 180.78 LBS | HEART RATE: 88 BPM | RESPIRATION RATE: 16 BRPM | OXYGEN SATURATION: 99 % | SYSTOLIC BLOOD PRESSURE: 138 MMHG

## 2023-03-01 DIAGNOSIS — Z98.890 OTHER SPECIFIED POSTPROCEDURAL STATES: Chronic | ICD-10-CM

## 2023-03-01 DIAGNOSIS — M77.8 OTHER ENTHESOPATHIES, NOT ELSEWHERE CLASSIFIED: ICD-10-CM

## 2023-03-01 DIAGNOSIS — Z00.8 ENCOUNTER FOR OTHER GENERAL EXAMINATION: ICD-10-CM

## 2023-03-01 PROCEDURE — 99214 OFFICE O/P EST MOD 30 MIN: CPT

## 2023-03-01 PROCEDURE — 73221 MRI JOINT UPR EXTREM W/O DYE: CPT | Mod: 26,RT

## 2023-03-01 PROCEDURE — 73221 MRI JOINT UPR EXTREM W/O DYE: CPT

## 2023-03-07 NOTE — DISCUSSION/SUMMARY
[FreeTextEntry1] : 63 F with myocardial bridging presents for palpitations. \par \par Palpitations: \par -previous monitor with PVCs\par -2 weeks ziopatch put on today

## 2023-03-07 NOTE — REASON FOR VISIT
[FreeTextEntry1] : 62 F with PMH of myocardial bridging on CTA, breast cancer in remission presents for palpitations that is worse from before. No chest pain, IVERSON, SOB, PND or orthopnea.

## 2023-03-08 ENCOUNTER — RESULT CHARGE (OUTPATIENT)
Age: 63
End: 2023-03-08

## 2023-03-08 ENCOUNTER — APPOINTMENT (OUTPATIENT)
Dept: FAMILY MEDICINE | Facility: HOSPITAL | Age: 63
End: 2023-03-08

## 2023-03-08 ENCOUNTER — OUTPATIENT (OUTPATIENT)
Dept: OUTPATIENT SERVICES | Facility: HOSPITAL | Age: 63
LOS: 1 days | End: 2023-03-08
Payer: MEDICAID

## 2023-03-08 VITALS
DIASTOLIC BLOOD PRESSURE: 83 MMHG | RESPIRATION RATE: 16 BRPM | TEMPERATURE: 97.8 F | BODY MASS INDEX: 31.89 KG/M2 | SYSTOLIC BLOOD PRESSURE: 133 MMHG | OXYGEN SATURATION: 96 % | WEIGHT: 180 LBS | HEART RATE: 87 BPM

## 2023-03-08 DIAGNOSIS — Z00.00 ENCOUNTER FOR GENERAL ADULT MEDICAL EXAMINATION WITHOUT ABNORMAL FINDINGS: ICD-10-CM

## 2023-03-08 DIAGNOSIS — Z98.890 OTHER SPECIFIED POSTPROCEDURAL STATES: Chronic | ICD-10-CM

## 2023-03-08 PROCEDURE — G0463: CPT

## 2023-03-08 PROCEDURE — 83036 HEMOGLOBIN GLYCOSYLATED A1C: CPT

## 2023-03-13 DIAGNOSIS — M77.8 OTHER ENTHESOPATHIES, NOT ELSEWHERE CLASSIFIED: ICD-10-CM

## 2023-03-13 DIAGNOSIS — M75.50 BURSITIS OF UNSPECIFIED SHOULDER: ICD-10-CM

## 2023-03-13 DIAGNOSIS — M25.511 PAIN IN RIGHT SHOULDER: ICD-10-CM

## 2023-03-16 NOTE — ED ADULT NURSE NOTE - EXTENSIONS OF SELF_ADULT
None Niacinamide Pregnancy And Lactation Text: These medications are considered safe during pregnancy.

## 2023-03-20 LAB — HBA1C MFR BLD HPLC: 7.3

## 2023-03-22 NOTE — PHYSICAL EXAM
[Normal] : normal rate, regular rhythm, normal S1 and S2 and no murmur heard [No Joint Swelling] : no joint swelling [de-identified] :  +pain on internal rotation of RUE +5/5 motor strength BL UE

## 2023-03-22 NOTE — REVIEW OF SYSTEMS
[Joint Pain] : joint pain [Muscle Pain] : muscle pain [Negative] : Respiratory [Joint Stiffness] : no joint stiffness [Joint Swelling] : no joint swelling [Muscle Weakness] : no muscle weakness [Back Pain] : no back pain

## 2023-03-22 NOTE — HISTORY OF PRESENT ILLNESS
[FreeTextEntry1] : Abnormal MRI results follow-up  [de-identified] : 62 year old F presents to the clinic for right shoulder pain follow-up. Pt said that she is still suffering from   sharp, constant pain in the right shoulder Patient reports difficulty with internal rotation of right arm. Pain is refractory to physical therapy, diclofenac gel, Tylenol/Motrin and Naproxen. She has been going to Physical therapy twice weekly for last 3 weeks. She reports pain is radiating to the right third digit. Denies any alleviating factors.

## 2023-04-11 ENCOUNTER — OUTPATIENT (OUTPATIENT)
Dept: OUTPATIENT SERVICES | Facility: HOSPITAL | Age: 63
LOS: 1 days | End: 2023-04-11
Payer: SELF-PAY

## 2023-04-11 ENCOUNTER — APPOINTMENT (OUTPATIENT)
Dept: ORTHOPEDIC SURGERY | Facility: HOSPITAL | Age: 63
End: 2023-04-11

## 2023-04-11 VITALS
DIASTOLIC BLOOD PRESSURE: 75 MMHG | TEMPERATURE: 98.1 F | RESPIRATION RATE: 16 BRPM | OXYGEN SATURATION: 95 % | SYSTOLIC BLOOD PRESSURE: 113 MMHG | HEART RATE: 94 BPM | WEIGHT: 185 LBS | BODY MASS INDEX: 32.77 KG/M2

## 2023-04-11 DIAGNOSIS — M75.101 UNSPECIFIED ROTATOR CUFF TEAR OR RUPTURE OF RIGHT SHOULDER, NOT SPECIFIED AS TRAUMATIC: ICD-10-CM

## 2023-04-11 DIAGNOSIS — Z00.00 ENCOUNTER FOR GENERAL ADULT MEDICAL EXAMINATION WITHOUT ABNORMAL FINDINGS: ICD-10-CM

## 2023-04-11 PROCEDURE — G0463: CPT

## 2023-04-12 DIAGNOSIS — M75.101 UNSPECIFIED ROTATOR CUFF TEAR OR RUPTURE OF RIGHT SHOULDER, NOT SPECIFIED AS TRAUMATIC: ICD-10-CM

## 2023-04-13 ENCOUNTER — RESULT REVIEW (OUTPATIENT)
Age: 63
End: 2023-04-13

## 2023-04-13 ENCOUNTER — APPOINTMENT (OUTPATIENT)
Dept: ULTRASOUND IMAGING | Facility: IMAGING CENTER | Age: 63
End: 2023-04-13
Payer: COMMERCIAL

## 2023-04-13 ENCOUNTER — APPOINTMENT (OUTPATIENT)
Dept: MAMMOGRAPHY | Facility: IMAGING CENTER | Age: 63
End: 2023-04-13
Payer: COMMERCIAL

## 2023-04-13 ENCOUNTER — OUTPATIENT (OUTPATIENT)
Dept: OUTPATIENT SERVICES | Facility: HOSPITAL | Age: 63
LOS: 1 days | End: 2023-04-13
Payer: COMMERCIAL

## 2023-04-13 DIAGNOSIS — Z98.890 OTHER SPECIFIED POSTPROCEDURAL STATES: Chronic | ICD-10-CM

## 2023-04-13 DIAGNOSIS — C50.919 MALIGNANT NEOPLASM OF UNSPECIFIED SITE OF UNSPECIFIED FEMALE BREAST: ICD-10-CM

## 2023-04-13 PROCEDURE — 77063 BREAST TOMOSYNTHESIS BI: CPT | Mod: 26,52

## 2023-04-13 PROCEDURE — 77063 BREAST TOMOSYNTHESIS BI: CPT

## 2023-04-13 PROCEDURE — 77067 SCR MAMMO BI INCL CAD: CPT | Mod: 26,LT,52

## 2023-04-13 PROCEDURE — 77067 SCR MAMMO BI INCL CAD: CPT

## 2023-05-05 ENCOUNTER — APPOINTMENT (OUTPATIENT)
Dept: MAMMOGRAPHY | Facility: IMAGING CENTER | Age: 63
End: 2023-05-05

## 2023-05-05 ENCOUNTER — APPOINTMENT (OUTPATIENT)
Dept: ULTRASOUND IMAGING | Facility: IMAGING CENTER | Age: 63
End: 2023-05-05

## 2023-05-10 ENCOUNTER — RESULT REVIEW (OUTPATIENT)
Age: 63
End: 2023-05-10

## 2023-05-10 ENCOUNTER — APPOINTMENT (OUTPATIENT)
Dept: ORTHOPEDIC SURGERY | Facility: HOSPITAL | Age: 63
End: 2023-05-10

## 2023-05-10 ENCOUNTER — OUTPATIENT (OUTPATIENT)
Dept: OUTPATIENT SERVICES | Facility: HOSPITAL | Age: 63
LOS: 1 days | End: 2023-05-10
Payer: SELF-PAY

## 2023-05-10 VITALS
TEMPERATURE: 98 F | BODY MASS INDEX: 31.71 KG/M2 | RESPIRATION RATE: 16 BRPM | SYSTOLIC BLOOD PRESSURE: 138 MMHG | HEIGHT: 63 IN | WEIGHT: 179 LBS | DIASTOLIC BLOOD PRESSURE: 83 MMHG | HEART RATE: 89 BPM

## 2023-05-10 DIAGNOSIS — M25.50 PAIN IN UNSPECIFIED JOINT: ICD-10-CM

## 2023-05-10 DIAGNOSIS — Z98.890 OTHER SPECIFIED POSTPROCEDURAL STATES: Chronic | ICD-10-CM

## 2023-05-10 PROCEDURE — 73030 X-RAY EXAM OF SHOULDER: CPT

## 2023-05-10 PROCEDURE — 73020 X-RAY EXAM OF SHOULDER: CPT

## 2023-05-10 PROCEDURE — 73020 X-RAY EXAM OF SHOULDER: CPT | Mod: 26,59,RT

## 2023-05-10 PROCEDURE — G0463: CPT

## 2023-05-10 PROCEDURE — 73030 X-RAY EXAM OF SHOULDER: CPT | Mod: 26,RT

## 2023-05-10 RX ORDER — OMEPRAZOLE 20 MG/1
20 CAPSULE, DELAYED RELEASE ORAL DAILY
Qty: 30 | Refills: 4 | Status: DISCONTINUED | COMMUNITY
Start: 2020-06-24 | End: 2023-05-10

## 2023-05-11 DIAGNOSIS — M75.50 BURSITIS OF UNSPECIFIED SHOULDER: ICD-10-CM

## 2023-05-24 ENCOUNTER — APPOINTMENT (OUTPATIENT)
Dept: ULTRASOUND IMAGING | Facility: IMAGING CENTER | Age: 63
End: 2023-05-24

## 2023-05-24 ENCOUNTER — APPOINTMENT (OUTPATIENT)
Dept: MAMMOGRAPHY | Facility: IMAGING CENTER | Age: 63
End: 2023-05-24

## 2023-06-12 ENCOUNTER — NON-APPOINTMENT (OUTPATIENT)
Age: 63
End: 2023-06-12

## 2023-06-14 ENCOUNTER — APPOINTMENT (OUTPATIENT)
Dept: FAMILY MEDICINE | Facility: HOSPITAL | Age: 63
End: 2023-06-14

## 2023-08-09 ENCOUNTER — OUTPATIENT (OUTPATIENT)
Dept: OUTPATIENT SERVICES | Facility: HOSPITAL | Age: 63
LOS: 1 days | End: 2023-08-09
Payer: SELF-PAY

## 2023-08-09 ENCOUNTER — APPOINTMENT (OUTPATIENT)
Dept: ORTHOPEDIC SURGERY | Facility: HOSPITAL | Age: 63
End: 2023-08-09

## 2023-08-09 VITALS
SYSTOLIC BLOOD PRESSURE: 128 MMHG | BODY MASS INDEX: 31.36 KG/M2 | TEMPERATURE: 97.2 F | WEIGHT: 177 LBS | OXYGEN SATURATION: 98 % | DIASTOLIC BLOOD PRESSURE: 87 MMHG | RESPIRATION RATE: 41 BRPM | HEART RATE: 89 BPM | HEIGHT: 63 IN

## 2023-08-09 DIAGNOSIS — M79.9 SOFT TISSUE DISORDER, UNSPECIFIED: ICD-10-CM

## 2023-08-09 DIAGNOSIS — Z98.890 OTHER SPECIFIED POSTPROCEDURAL STATES: Chronic | ICD-10-CM

## 2023-08-09 DIAGNOSIS — M25.511 PAIN IN RIGHT SHOULDER: ICD-10-CM

## 2023-08-09 PROCEDURE — G0463: CPT

## 2023-09-08 NOTE — ED ADULT NURSE NOTE - AGGRAVATING FACTORS
Per chart review, pt has decided to pursue treatment outside of [facility].  Should pt decide to pursue cancer care at [facility], oncology nutrition services will be available upon request. none

## 2023-09-18 ENCOUNTER — APPOINTMENT (OUTPATIENT)
Dept: FAMILY MEDICINE | Facility: HOSPITAL | Age: 63
End: 2023-09-18

## 2023-09-25 ENCOUNTER — OUTPATIENT (OUTPATIENT)
Dept: OUTPATIENT SERVICES | Facility: HOSPITAL | Age: 63
LOS: 1 days | End: 2023-09-25
Payer: SELF-PAY

## 2023-09-25 ENCOUNTER — APPOINTMENT (OUTPATIENT)
Dept: FAMILY MEDICINE | Facility: HOSPITAL | Age: 63
End: 2023-09-25

## 2023-09-25 ENCOUNTER — APPOINTMENT (OUTPATIENT)
Dept: RADIOLOGY | Facility: HOSPITAL | Age: 63
End: 2023-09-25
Payer: COMMERCIAL

## 2023-09-25 ENCOUNTER — RESULT REVIEW (OUTPATIENT)
Age: 63
End: 2023-09-25

## 2023-09-25 VITALS
SYSTOLIC BLOOD PRESSURE: 136 MMHG | BODY MASS INDEX: 32.96 KG/M2 | OXYGEN SATURATION: 98 % | TEMPERATURE: 97.6 F | HEIGHT: 63 IN | HEART RATE: 86 BPM | RESPIRATION RATE: 14 BRPM | WEIGHT: 186 LBS | DIASTOLIC BLOOD PRESSURE: 81 MMHG

## 2023-09-25 DIAGNOSIS — M89.8X1 OTHER SPECIFIED DISORDERS OF BONE, SHOULDER: ICD-10-CM

## 2023-09-25 DIAGNOSIS — Z98.890 OTHER SPECIFIED POSTPROCEDURAL STATES: Chronic | ICD-10-CM

## 2023-09-25 DIAGNOSIS — Z00.00 ENCOUNTER FOR GENERAL ADULT MEDICAL EXAMINATION WITHOUT ABNORMAL FINDINGS: ICD-10-CM

## 2023-09-25 PROCEDURE — 82043 UR ALBUMIN QUANTITATIVE: CPT

## 2023-09-25 PROCEDURE — 73000 X-RAY EXAM OF COLLAR BONE: CPT

## 2023-09-25 PROCEDURE — 73000 X-RAY EXAM OF COLLAR BONE: CPT | Mod: 26,RT

## 2023-09-25 PROCEDURE — G0463: CPT

## 2023-09-25 RX ORDER — NAPROXEN 500 MG/1
500 TABLET ORAL
Qty: 10 | Refills: 0 | Status: DISCONTINUED | COMMUNITY
Start: 2020-07-01 | End: 2023-09-25

## 2023-09-25 RX ORDER — DICLOFENAC SODIUM 1% 10 MG/G
1 GEL TOPICAL DAILY
Qty: 1 | Refills: 0 | Status: DISCONTINUED | COMMUNITY
Start: 2023-01-11 | End: 2023-09-25

## 2023-09-25 RX ORDER — MELOXICAM 7.5 MG/1
7.5 TABLET ORAL DAILY
Qty: 14 | Refills: 0 | Status: DISCONTINUED | COMMUNITY
Start: 2023-05-10 | End: 2023-09-25

## 2023-09-26 DIAGNOSIS — E11.9 TYPE 2 DIABETES MELLITUS WITHOUT COMPLICATIONS: ICD-10-CM

## 2023-09-28 LAB
CREAT SPEC-SCNC: 97 MG/DL
MICROALBUMIN 24H UR DL<=1MG/L-MCNC: 5 MG/DL
MICROALBUMIN/CREAT 24H UR-RTO: 52 MG/G

## 2023-11-22 ENCOUNTER — APPOINTMENT (OUTPATIENT)
Dept: FAMILY MEDICINE | Facility: HOSPITAL | Age: 63
End: 2023-11-22

## 2023-11-22 ENCOUNTER — OUTPATIENT (OUTPATIENT)
Dept: OUTPATIENT SERVICES | Facility: HOSPITAL | Age: 63
LOS: 1 days | End: 2023-11-22
Payer: SELF-PAY

## 2023-11-22 VITALS — SYSTOLIC BLOOD PRESSURE: 134 MMHG | OXYGEN SATURATION: 96 % | HEART RATE: 104 BPM | DIASTOLIC BLOOD PRESSURE: 85 MMHG

## 2023-11-22 VITALS
HEART RATE: 87 BPM | SYSTOLIC BLOOD PRESSURE: 130 MMHG | RESPIRATION RATE: 14 BRPM | BODY MASS INDEX: 32.24 KG/M2 | OXYGEN SATURATION: 96 % | TEMPERATURE: 96 F | DIASTOLIC BLOOD PRESSURE: 83 MMHG | WEIGHT: 182 LBS

## 2023-11-22 VITALS — SYSTOLIC BLOOD PRESSURE: 127 MMHG | OXYGEN SATURATION: 96 % | HEART RATE: 95 BPM | DIASTOLIC BLOOD PRESSURE: 82 MMHG

## 2023-11-22 DIAGNOSIS — M75.50 BURSITIS OF UNSPECIFIED SHOULDER: ICD-10-CM

## 2023-11-22 DIAGNOSIS — Z00.00 ENCOUNTER FOR GENERAL ADULT MEDICAL EXAMINATION WITHOUT ABNORMAL FINDINGS: ICD-10-CM

## 2023-11-22 DIAGNOSIS — H92.02 OTALGIA, LEFT EAR: ICD-10-CM

## 2023-11-22 DIAGNOSIS — Z98.890 OTHER SPECIFIED POSTPROCEDURAL STATES: Chronic | ICD-10-CM

## 2023-11-22 DIAGNOSIS — Z86.69 PERSONAL HISTORY OF OTHER DISEASES OF THE NERVOUS SYSTEM AND SENSE ORGANS: ICD-10-CM

## 2023-11-22 PROCEDURE — G0463: CPT

## 2023-12-14 ENCOUNTER — OUTPATIENT (OUTPATIENT)
Dept: OUTPATIENT SERVICES | Facility: HOSPITAL | Age: 63
LOS: 1 days | End: 2023-12-14
Payer: SELF-PAY

## 2023-12-14 ENCOUNTER — RESULT REVIEW (OUTPATIENT)
Age: 63
End: 2023-12-14

## 2023-12-14 ENCOUNTER — APPOINTMENT (OUTPATIENT)
Dept: FAMILY MEDICINE | Facility: HOSPITAL | Age: 63
End: 2023-12-14
Payer: COMMERCIAL

## 2023-12-14 VITALS
HEART RATE: 85 BPM | SYSTOLIC BLOOD PRESSURE: 148 MMHG | RESPIRATION RATE: 14 BRPM | TEMPERATURE: 97.7 F | WEIGHT: 177 LBS | DIASTOLIC BLOOD PRESSURE: 87 MMHG | OXYGEN SATURATION: 95 % | BODY MASS INDEX: 31.35 KG/M2

## 2023-12-14 DIAGNOSIS — S09.90XA UNSPECIFIED INJURY OF HEAD, INITIAL ENCOUNTER: ICD-10-CM

## 2023-12-14 DIAGNOSIS — Z98.890 OTHER SPECIFIED POSTPROCEDURAL STATES: Chronic | ICD-10-CM
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
## 2023-12-14 DIAGNOSIS — Z00.00 ENCOUNTER FOR GENERAL ADULT MEDICAL EXAMINATION WITHOUT ABNORMAL FINDINGS: ICD-10-CM

## 2023-12-14 LAB
A1C WITH ESTIMATED AVERAGE GLUCOSE RESULT: 8.3 % — HIGH (ref 4–5.6)
A1C WITH ESTIMATED AVERAGE GLUCOSE RESULT: 8.3 % — HIGH (ref 4–5.6)
ESTIMATED AVERAGE GLUCOSE: 192 MG/DL — HIGH (ref 68–114)
ESTIMATED AVERAGE GLUCOSE: 192 MG/DL — HIGH (ref 68–114)

## 2023-12-14 PROCEDURE — 36415 COLL VENOUS BLD VENIPUNCTURE: CPT

## 2023-12-14 PROCEDURE — 70450 CT HEAD/BRAIN W/O DYE: CPT | Mod: 26

## 2023-12-14 PROCEDURE — G0463: CPT

## 2023-12-14 PROCEDURE — 70450 CT HEAD/BRAIN W/O DYE: CPT

## 2023-12-14 PROCEDURE — 83036 HEMOGLOBIN GLYCOSYLATED A1C: CPT

## 2023-12-15 ENCOUNTER — NON-APPOINTMENT (OUTPATIENT)
Age: 63
End: 2023-12-15

## 2023-12-18 NOTE — HISTORY OF PRESENT ILLNESS
[FreeTextEntry8] : Pt is a 62 yo F, PMH of  HLD, obesity, chronic shoulder pain, presents for left ear pain. Pt c/o 4 day hx of left ear pain, associated with dizziness and serrous discharge. Denies fever, chills, changes in hearing, headache.  Pt is also here for f/u chronic shoulder pain, noting that it is flaring up again, and would like to go back to physical therapy again, but needs a new referral. No new associated sxs with the shoulder. No other urgent sxs or complaints endorsed at this time.

## 2023-12-18 NOTE — PHYSICAL EXAM
[de-identified] : Left ear canal mildly erythematous, with some serrous discharge noted [de-identified] : Pain noted on abduction of rt shoulder

## 2023-12-18 NOTE — REVIEW OF SYSTEMS
[Hearing Loss] : no hearing loss [Hoarseness] : no hoarseness [Sore Throat] : no sore throat [FreeTextEntry9] : Rt shoulder pain
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
## 2023-12-20 NOTE — ASSESSMENT
[FreeTextEntry1] : She is a 63 y/o  F with Stage II invasive lobular carcinoma in 2006. We reviewed signs and symptoms of breast cancer recurrence. She has no new signs or symptoms. Will have mammogram of the L breast 4/2023: Rx given. Reviewed blood work from 9/2022 with CMP WNL. Next follow up in 1 year but earlier if any new symptoms.\par

## 2023-12-20 NOTE — PHYSICAL EXAM
[Fully active, able to carry on all pre-disease performance without restriction] : Status 0 - Fully active, able to carry on all pre-disease performance without restriction [Normal] : affect appropriate [de-identified] : R mastectomy with VOLODYMYR flap reconstruction; no abnl masses or axillary LN palpable  [de-identified] : irritation from holter monitor leads over the anterior R chest wall

## 2023-12-20 NOTE — REVIEW OF SYSTEMS
[Headache] : headache [Negative] : Heme/Lymph [Hearing Loss] : no hearing loss [Nasal Discharge] : no nasal discharge [Dizziness] : no dizziness [Fainting] : no fainting [Confusion] : no confusion [Memory Loss] : no memory loss [Unsteady Walking] : no ataxia

## 2023-12-20 NOTE — REVIEW OF SYSTEMS
[Joint Pain] : joint pain [Negative] : Allergic/Immunologic [Joint Stiffness] : no joint stiffness [Muscle Pain] : no muscle pain [Muscle Weakness] : no muscle weakness [FreeTextEntry9] : shoulder pain

## 2023-12-20 NOTE — HISTORY OF PRESENT ILLNESS
[Disease: _____________________] : Disease: [unfilled] [AJCC Stage: ____] : AJCC Stage: [unfilled] [de-identified] : Age 46: Stage 2 lobular breast cancer (ER+/[place]+/Her2-) diagnosed in July 2006 s/p right mastectomy ([facility]) and reconstruction (2008, [facility]) treated with 8 cycles of adriamycin + cyclophosphamide then Taxol as well as XRT (completed May 2007), then tamoxifen x 5 years (completed 2012).\par  [de-identified] : invasive lobular carcinoma ER/ [place] positive Jzd9ome negative  [de-identified] : Has been having R shoulder pain: s/p injection and feeling better: she had MRI shoulder today and will have follow up with PCP. She had covid few weeks ago but feels completely recovered: had fever and some palpitations: her PCP gave her Holter monitor but had to take off for MRI today. Denies any new breast pain or chest wall changes. No back pain, cough or HA. Will need Rx for mammogram.

## 2023-12-20 NOTE — PHYSICAL EXAM
[No Acute Distress] : no acute distress [Well Nourished] : well nourished [Normal Sclera/Conjunctiva] : normal sclera/conjunctiva [PERRL] : pupils equal round and reactive to light [EOMI] : extraocular movements intact [No Spinal Tenderness] : no spinal tenderness [Grossly Normal Strength/Tone] : grossly normal strength/tone [Normal Station and Gait] : the gait and station were normal [Coordination Grossly Intact] : coordination grossly intact [No Focal Deficits] : no focal deficits [Normal Gait] : normal gait [Cranial Nerves Optic (II)] : visual acuity and visual fields were intact [Cranial Nerves Trigeminal (V)] : sensation to the face and masseter strength were intact [Cranial Nerves Oculomotor (III)] : the extraocular motions were intact [Cranial Nerves Facial (VII)] : facial strength was intact bilaterally [Cranial Nerves Vestibulocochlear (VIII)] : hearing was intact [Cranial Nerves Accessory (XI - Cranial And Spinal)] : shoulder shrug was intact bilaterally [Cranial Nerves Hypoglossal (XII)] : there was no tongue deviation with protrusion [Sensation Tactile Decrease] : light touch was intact [Normal Insight/Judgement] : insight and judgment were intact [Limited Balance] : balance was intact [Romberg's Sign] : Romberg's sign was negtive [Past-pointing] : there was no past-pointing [Dysdiadochokinesia Bilaterally] : not present [Coordination - Dysmetria Impaired Finger-to-Nose Bilateral] : not present [Coordination - Dysmetria Impaired Heel-to-Shin Bilateral] : not present [de-identified] : No cervical tenderness noted  [de-identified] : R posterolateral parietal/occipital resolving ecchymosis and swelling [de-identified] : blunted affect

## 2023-12-29 ENCOUNTER — APPOINTMENT (OUTPATIENT)
Dept: FAMILY MEDICINE | Facility: HOSPITAL | Age: 63
End: 2023-12-29

## 2024-01-01 NOTE — REVIEW OF SYSTEMS
Child's Well Visit, 2 Months: Care Instructions  Your baby is growing fast. They're learning about the world around them and starting to interact more. Your baby may , gurgle, and sigh. When lying on their tummy, they may start to push up with their arms.    Your baby may smile back when you smile at them. They may respond to voices that are familiar to them.   Show your baby new and interesting things. Carry your baby around the room, and take them with you when you leave the house. Talk about the things you see.     Keeping your baby safe    Always use a rear-facing car seat. Install it properly in the back seat.  Never shake or spank your baby.  Never leave your baby alone.  Do not smoke or let your baby be near smoke.    Keeping your baby safe while they sleep    Always put your baby to sleep on their back.  Don't put sleep positioners, bumper pads, loose bedding, or stuffed animals in the crib.  Don't sleep with your baby. This includes in your bed or on a couch or chair.  Have your baby sleep in the same room as you for at least the first 6 months.  Don't place your baby in a car seat, sling, swing, bouncer, or stroller to sleep.    Feeding your baby    Feed your baby right before they go to sleep.  Make middle-of-the-night feedings short and quiet.  Feed your baby breast milk or formula with iron.  If you breastfeed, continue for as long as it works for you and your baby.    Caring for yourself    Trust yourself. If something doesn't feel right with your body, tell your doctor right away.  Sleep when your baby sleeps, drink plenty of water, and ask for help if you need it.  Watch for the \"baby blues.\" If you or your partner feels sad or anxious for more than 2 weeks, tell your doctor.  Call your doctor or midwife with questions about breastfeeding.    Getting vaccines    Make sure your baby gets all the recommended vaccines.  Follow-up care is a key part of your child's treatment and safety. Be sure  [Cough] : cough [Negative] : Gastrointestinal

## 2024-01-03 ENCOUNTER — APPOINTMENT (OUTPATIENT)
Dept: ORTHOPEDIC SURGERY | Facility: HOSPITAL | Age: 64
End: 2024-01-03

## 2024-01-03 ENCOUNTER — OUTPATIENT (OUTPATIENT)
Dept: OUTPATIENT SERVICES | Facility: HOSPITAL | Age: 64
LOS: 1 days | End: 2024-01-03
Payer: SELF-PAY

## 2024-01-03 VITALS — HEART RATE: 86 BPM | SYSTOLIC BLOOD PRESSURE: 125 MMHG | DIASTOLIC BLOOD PRESSURE: 75 MMHG | TEMPERATURE: 96.5 F

## 2024-01-03 DIAGNOSIS — M25.50 PAIN IN UNSPECIFIED JOINT: ICD-10-CM

## 2024-01-03 DIAGNOSIS — Z98.890 OTHER SPECIFIED POSTPROCEDURAL STATES: Chronic | ICD-10-CM

## 2024-01-03 PROCEDURE — G0463: CPT

## 2024-01-03 RX ORDER — PSYLLIUM HUSK 0.4 G
1000-800 CAPSULE ORAL
Qty: 30 | Refills: 11 | Status: DISCONTINUED | COMMUNITY
Start: 2020-02-20 | End: 2024-01-03

## 2024-01-03 RX ORDER — CIPROFLOXACIN 0.5 MG/.25ML
0.2 SOLUTION/ DROPS AURICULAR (OTIC) TWICE DAILY
Qty: 1 | Refills: 0 | Status: DISCONTINUED | COMMUNITY
Start: 2022-11-02 | End: 2024-01-03

## 2024-01-08 DIAGNOSIS — M25.511 PAIN IN RIGHT SHOULDER: ICD-10-CM

## 2024-01-10 ENCOUNTER — APPOINTMENT (OUTPATIENT)
Dept: ORTHOPEDIC SURGERY | Facility: HOSPITAL | Age: 64
End: 2024-01-10

## 2024-01-10 ENCOUNTER — OUTPATIENT (OUTPATIENT)
Dept: OUTPATIENT SERVICES | Facility: HOSPITAL | Age: 64
LOS: 1 days | End: 2024-01-10
Payer: SELF-PAY

## 2024-01-10 ENCOUNTER — APPOINTMENT (OUTPATIENT)
Dept: MRI IMAGING | Facility: CLINIC | Age: 64
End: 2024-01-10

## 2024-01-10 VITALS
HEART RATE: 79 BPM | SYSTOLIC BLOOD PRESSURE: 119 MMHG | RESPIRATION RATE: 14 BRPM | BODY MASS INDEX: 31.01 KG/M2 | HEIGHT: 63 IN | TEMPERATURE: 96.9 F | DIASTOLIC BLOOD PRESSURE: 78 MMHG | WEIGHT: 175 LBS

## 2024-01-10 DIAGNOSIS — M54.12 RADICULOPATHY, CERVICAL REGION: ICD-10-CM

## 2024-01-10 DIAGNOSIS — Z98.890 OTHER SPECIFIED POSTPROCEDURAL STATES: Chronic | ICD-10-CM

## 2024-01-10 DIAGNOSIS — M25.511 PAIN IN RIGHT SHOULDER: ICD-10-CM

## 2024-01-10 DIAGNOSIS — M79.9 SOFT TISSUE DISORDER, UNSPECIFIED: ICD-10-CM

## 2024-01-10 PROCEDURE — 72141 MRI NECK SPINE W/O DYE: CPT | Mod: 26

## 2024-01-10 PROCEDURE — 72141 MRI NECK SPINE W/O DYE: CPT

## 2024-01-10 PROCEDURE — G0463: CPT

## 2024-01-11 PROBLEM — M25.511 RIGHT SHOULDER PAIN: Status: ACTIVE | Noted: 2023-01-11

## 2024-01-12 DIAGNOSIS — M54.12 RADICULOPATHY, CERVICAL REGION: ICD-10-CM

## 2024-01-12 DIAGNOSIS — M25.511 PAIN IN RIGHT SHOULDER: ICD-10-CM

## 2024-01-27 ENCOUNTER — OUTPATIENT (OUTPATIENT)
Dept: OUTPATIENT SERVICES | Facility: HOSPITAL | Age: 64
LOS: 1 days | End: 2024-01-27
Payer: SELF-PAY

## 2024-01-27 ENCOUNTER — APPOINTMENT (OUTPATIENT)
Dept: FAMILY MEDICINE | Facility: HOSPITAL | Age: 64
End: 2024-01-27

## 2024-01-27 VITALS
WEIGHT: 179 LBS | RESPIRATION RATE: 14 BRPM | HEART RATE: 95 BPM | TEMPERATURE: 98.1 F | BODY MASS INDEX: 31.71 KG/M2 | DIASTOLIC BLOOD PRESSURE: 82 MMHG | OXYGEN SATURATION: 95 % | SYSTOLIC BLOOD PRESSURE: 137 MMHG

## 2024-01-27 DIAGNOSIS — Z98.890 OTHER SPECIFIED POSTPROCEDURAL STATES: Chronic | ICD-10-CM

## 2024-01-27 DIAGNOSIS — Z00.00 ENCOUNTER FOR GENERAL ADULT MEDICAL EXAMINATION WITHOUT ABNORMAL FINDINGS: ICD-10-CM

## 2024-01-27 DIAGNOSIS — J06.9 ACUTE UPPER RESPIRATORY INFECTION, UNSPECIFIED: ICD-10-CM

## 2024-01-27 PROCEDURE — G0463: CPT

## 2024-01-27 PROCEDURE — 0225U NFCT DS DNA&RNA 21 SARSCOV2: CPT

## 2024-01-28 ENCOUNTER — NON-APPOINTMENT (OUTPATIENT)
Age: 64
End: 2024-01-28

## 2024-01-29 LAB
B PERT DNA SPEC QL NAA+PROBE: NOT DETECTED
C PNEUM DNA SPEC QL NAA+PROBE: NOT DETECTED
CORONAVIRUS (229E,HKU1,NL63,OC43): NOT DETECTED
FLUAV H1 2009 PAND RNA SPEC QL NAA+PROBE: NOT DETECTED
FLUAV H1 RNA SPEC QL NAA+PROBE: NOT DETECTED
FLUAV H3 RNA SPEC QL NAA+PROBE: NOT DETECTED
FLUAV SUBTYP SPEC NAA+PROBE: NOT DETECTED
FLUBV RNA SPEC QL NAA+PROBE: NOT DETECTED
HADV DNA SPEC QL NAA+PROBE: NOT DETECTED
HMPV RNA SPEC QL NAA+PROBE: NOT DETECTED
HPIV1 RNA SPEC QL NAA+PROBE: NOT DETECTED
HPIV2 RNA SPEC QL NAA+PROBE: NOT DETECTED
HPIV3 RNA SPEC QL NAA+PROBE: NOT DETECTED
HPIV4 RNA SPEC QL NAA+PROBE: NOT DETECTED
RAPID RVP RESULT: NOT DETECTED
RSV RNA SPEC QL NAA+PROBE: NOT DETECTED
RV+EV RNA SPEC QL NAA+PROBE: NOT DETECTED
SARS-COV-2 RNA PNL RESP NAA+PROBE: NOT DETECTED

## 2024-01-29 NOTE — HISTORY OF PRESENT ILLNESS
[FreeTextEntry8] : 63 F with PMH of T2DM presenting for a two day onset of cough, sore throat, rhinorrhea, and congestion. No one at home sick. No one at work sick. Denied fevers, chills, sinus pain, ageusia, anosmia. ROS otherwise negative.

## 2024-01-29 NOTE — ASSESSMENT
[FreeTextEntry1] : - RVP ordered  - Advised that if positive, needs to quarantine for 5 days - Supportive care  - Can take cepacol, flonase, and tessalon perle   RTC for T2DM visit  Case d/w Dr. Klein

## 2024-02-06 ENCOUNTER — OUTPATIENT (OUTPATIENT)
Dept: OUTPATIENT SERVICES | Facility: HOSPITAL | Age: 64
LOS: 1 days | End: 2024-02-06
Payer: SELF-PAY

## 2024-02-06 ENCOUNTER — APPOINTMENT (OUTPATIENT)
Dept: FAMILY MEDICINE | Facility: HOSPITAL | Age: 64
End: 2024-02-06

## 2024-02-06 DIAGNOSIS — Z98.890 OTHER SPECIFIED POSTPROCEDURAL STATES: Chronic | ICD-10-CM

## 2024-02-06 DIAGNOSIS — J06.9 ACUTE UPPER RESPIRATORY INFECTION, UNSPECIFIED: ICD-10-CM

## 2024-02-06 DIAGNOSIS — Z00.00 ENCOUNTER FOR GENERAL ADULT MEDICAL EXAMINATION WITHOUT ABNORMAL FINDINGS: ICD-10-CM

## 2024-02-06 DIAGNOSIS — Z29.9 ENCOUNTER FOR PROPHYLACTIC MEASURES, UNSPECIFIED: ICD-10-CM

## 2024-02-06 NOTE — REVIEW OF SYSTEMS
[Nasal Discharge] : nasal discharge [Sore Throat] : sore throat [Cough] : cough [Negative] : Genitourinary [Shortness Of Breath] : no shortness of breath [Wheezing] : no wheezing

## 2024-02-06 NOTE — PHYSICAL EXAM
[No Acute Distress] : no acute distress [Well-Appearing] : well-appearing [Normal Voice/Communication] : normal voice/communication [Normal Sclera/Conjunctiva] : normal sclera/conjunctiva [Normal Outer Ear/Nose] : the outer ears and nose were normal in appearance [Supple] : supple [No Respiratory Distress] : no respiratory distress  [No Accessory Muscle Use] : no accessory muscle use [Clear to Auscultation] : lungs were clear to auscultation bilaterally [Normal Rate] : normal rate  [Regular Rhythm] : with a regular rhythm [Normal S1, S2] : normal S1 and S2 [Soft] : abdomen soft [Non Tender] : non-tender [Non-distended] : non-distended [de-identified] : Nasal discharge noted as well as slilghtly erythematous oropharynx

## 2024-02-06 NOTE — HISTORY OF PRESENT ILLNESS
[FreeTextEntry1] : f/URI [de-identified] : 63 F with PMH of T2DM presenting for f/u URI. Of note, pt presented for same on 1/27/24, c/otwo day onset of cough, sore throat, rhinorrhea, and congestion. RVP was negative, so pt was discharged on conservative mgt. Today pt feels better than before, however still has residual throat discomfort and congestion, and a lingering cough as well. Denies fevers, sob, palpitations, n/v. Pt is also in need of CPE and diabetes check but will return when she feels better for evaluation. No other urgent sxs or complaints endorsed at this time.

## 2024-02-07 ENCOUNTER — APPOINTMENT (OUTPATIENT)
Dept: ORTHOPEDIC SURGERY | Facility: HOSPITAL | Age: 64
End: 2024-02-07

## 2024-02-08 PROCEDURE — 85025 COMPLETE CBC W/AUTO DIFF WBC: CPT

## 2024-02-08 PROCEDURE — 83036 HEMOGLOBIN GLYCOSYLATED A1C: CPT

## 2024-02-08 PROCEDURE — G0463: CPT

## 2024-02-08 PROCEDURE — 36415 COLL VENOUS BLD VENIPUNCTURE: CPT

## 2024-02-08 PROCEDURE — 80053 COMPREHEN METABOLIC PANEL: CPT

## 2024-02-08 PROCEDURE — 80061 LIPID PANEL: CPT

## 2024-02-12 LAB
ALBUMIN SERPL ELPH-MCNC: 4.4 G/DL
ALP BLD-CCNC: 103 U/L
ALT SERPL-CCNC: 27 U/L
ANION GAP SERPL CALC-SCNC: 12 MMOL/L
AST SERPL-CCNC: 22 U/L
BASOPHILS # BLD AUTO: 0.03 K/UL
BASOPHILS NFR BLD AUTO: 0.4 %
BILIRUB SERPL-MCNC: 0.3 MG/DL
BUN SERPL-MCNC: 13 MG/DL
CALCIUM SERPL-MCNC: 9.3 MG/DL
CHLORIDE SERPL-SCNC: 104 MMOL/L
CHOLEST SERPL-MCNC: 163 MG/DL
CO2 SERPL-SCNC: 26 MMOL/L
CREAT SERPL-MCNC: 0.52 MG/DL
EGFR: 105 ML/MIN/1.73M2
EOSINOPHIL # BLD AUTO: 0.08 K/UL
EOSINOPHIL NFR BLD AUTO: 1.2 %
ESTIMATED AVERAGE GLUCOSE: 203 MG/DL
GLUCOSE SERPL-MCNC: 213 MG/DL
HBA1C MFR BLD HPLC: 8.7 %
HCT VFR BLD CALC: 39.4 %
HDLC SERPL-MCNC: 65 MG/DL
HGB BLD-MCNC: 13.2 G/DL
IMM GRANULOCYTES NFR BLD AUTO: 0.3 %
LDLC SERPL CALC-MCNC: 73 MG/DL
LYMPHOCYTES # BLD AUTO: 2.24 K/UL
LYMPHOCYTES NFR BLD AUTO: 32.8 %
MAN DIFF?: NORMAL
MCHC RBC-ENTMCNC: 29.7 PG
MCHC RBC-ENTMCNC: 33.5 GM/DL
MCV RBC AUTO: 88.5 FL
MONOCYTES # BLD AUTO: 0.45 K/UL
MONOCYTES NFR BLD AUTO: 6.6 %
NEUTROPHILS # BLD AUTO: 4 K/UL
NEUTROPHILS NFR BLD AUTO: 58.7 %
NONHDLC SERPL-MCNC: 98 MG/DL
PLATELET # BLD AUTO: 278 K/UL
POTASSIUM SERPL-SCNC: 4.5 MMOL/L
PROT SERPL-MCNC: 7 G/DL
RBC # BLD: 4.45 M/UL
RBC # FLD: 12.5 %
SODIUM SERPL-SCNC: 143 MMOL/L
TRIGL SERPL-MCNC: 149 MG/DL
WBC # FLD AUTO: 6.82 K/UL

## 2024-02-20 ENCOUNTER — OUTPATIENT (OUTPATIENT)
Dept: OUTPATIENT SERVICES | Facility: HOSPITAL | Age: 64
LOS: 1 days | End: 2024-02-20
Payer: SELF-PAY

## 2024-02-20 ENCOUNTER — APPOINTMENT (OUTPATIENT)
Dept: FAMILY MEDICINE | Facility: HOSPITAL | Age: 64
End: 2024-02-20

## 2024-02-20 VITALS
HEART RATE: 91 BPM | RESPIRATION RATE: 16 BRPM | WEIGHT: 182 LBS | OXYGEN SATURATION: 97 % | TEMPERATURE: 97.4 F | DIASTOLIC BLOOD PRESSURE: 82 MMHG | SYSTOLIC BLOOD PRESSURE: 132 MMHG | BODY MASS INDEX: 32.24 KG/M2

## 2024-02-20 DIAGNOSIS — E04.9 NONTOXIC GOITER, UNSPECIFIED: ICD-10-CM

## 2024-02-20 DIAGNOSIS — Z00.00 ENCOUNTER FOR GENERAL ADULT MEDICAL EXAMINATION WITHOUT ABNORMAL FINDINGS: ICD-10-CM

## 2024-02-20 DIAGNOSIS — J02.9 ACUTE PHARYNGITIS, UNSPECIFIED: ICD-10-CM

## 2024-02-20 DIAGNOSIS — Z98.890 OTHER SPECIFIED POSTPROCEDURAL STATES: Chronic | ICD-10-CM

## 2024-02-20 LAB — S PYO AG SPEC QL IA: NEGATIVE

## 2024-02-20 PROCEDURE — 84443 ASSAY THYROID STIM HORMONE: CPT

## 2024-02-20 PROCEDURE — 87081 CULTURE SCREEN ONLY: CPT

## 2024-02-20 PROCEDURE — G0463: CPT

## 2024-02-21 DIAGNOSIS — R05.3 CHRONIC COUGH: ICD-10-CM

## 2024-02-21 DIAGNOSIS — J02.9 ACUTE PHARYNGITIS, UNSPECIFIED: ICD-10-CM

## 2024-02-21 DIAGNOSIS — R19.00 INTRA-ABDOMINAL AND PELVIC SWELLING, MASS AND LUMP, UNSPECIFIED SITE: ICD-10-CM

## 2024-02-21 DIAGNOSIS — E04.9 NONTOXIC GOITER, UNSPECIFIED: ICD-10-CM

## 2024-02-21 LAB — TSH SERPL-ACNC: 1.58 UIU/ML

## 2024-02-24 ENCOUNTER — APPOINTMENT (OUTPATIENT)
Dept: FAMILY MEDICINE | Facility: HOSPITAL | Age: 64
End: 2024-02-24

## 2024-02-26 LAB — BACTERIA THROAT CULT: NORMAL

## 2024-02-26 NOTE — PHYSICAL EXAM
[de-identified] : +mild erythema in pharynx; two small white lesions on L adenoid, likely stones [de-identified] : +abdominal mass on Lower abdominal region below Abdominal flap incision, C/D/I

## 2024-02-26 NOTE — END OF VISIT
[] : Resident [FreeTextEntry3] : if patient continues to have cough needs referral CXR, possible pulmonary or ENT.

## 2024-02-26 NOTE — HISTORY OF PRESENT ILLNESS
[FreeTextEntry1] : f/u cough  [de-identified] : 63 year old female presenting for follow-up of cough. Pt reports continued complaints of cough. Reports that she has experienced cough for about 20 days now with sore throat. Reports that sore throat causes the cough. Has tried benzonatate and mucinex for cough, cepacol and epsom salt gargles for sore throat. Concerned that sore throat is persisting with mild improvement. Denies fevers, chills, fatigue, night sweats, chest pain, palpitations, SOB, dyspnea on exertion, nausea, vomiting, diarrhea, urinary frequency or burning with urination.

## 2024-03-01 ENCOUNTER — APPOINTMENT (OUTPATIENT)
Dept: FAMILY MEDICINE | Facility: HOSPITAL | Age: 64
End: 2024-03-01

## 2024-03-20 ENCOUNTER — APPOINTMENT (OUTPATIENT)
Dept: SURGERY | Facility: HOSPITAL | Age: 64
End: 2024-03-20

## 2024-03-20 ENCOUNTER — OUTPATIENT (OUTPATIENT)
Dept: OUTPATIENT SERVICES | Facility: HOSPITAL | Age: 64
LOS: 1 days | End: 2024-03-20
Payer: SELF-PAY

## 2024-03-20 ENCOUNTER — APPOINTMENT (OUTPATIENT)
Dept: FAMILY MEDICINE | Facility: HOSPITAL | Age: 64
End: 2024-03-20

## 2024-03-20 VITALS
BODY MASS INDEX: 32.06 KG/M2 | OXYGEN SATURATION: 96 % | WEIGHT: 181 LBS | SYSTOLIC BLOOD PRESSURE: 154 MMHG | DIASTOLIC BLOOD PRESSURE: 86 MMHG | HEART RATE: 96 BPM | TEMPERATURE: 97.9 F | RESPIRATION RATE: 16 BRPM

## 2024-03-20 VITALS — DIASTOLIC BLOOD PRESSURE: 84 MMHG | SYSTOLIC BLOOD PRESSURE: 136 MMHG

## 2024-03-20 DIAGNOSIS — R05.3 CHRONIC COUGH: ICD-10-CM

## 2024-03-20 DIAGNOSIS — Z98.890 OTHER SPECIFIED POSTPROCEDURAL STATES: Chronic | ICD-10-CM

## 2024-03-20 DIAGNOSIS — Z00.00 ENCOUNTER FOR GENERAL ADULT MEDICAL EXAMINATION WITHOUT ABNORMAL FINDINGS: ICD-10-CM

## 2024-03-20 PROCEDURE — G0463: CPT

## 2024-03-20 RX ORDER — TRIAMCINOLONE ACETONIDE 55 UG/1
55 SPRAY, METERED NASAL
Qty: 1 | Refills: 0 | Status: DISCONTINUED | COMMUNITY
Start: 2024-02-06 | End: 2024-03-20

## 2024-03-20 NOTE — HISTORY OF PRESENT ILLNESS
[FreeTextEntry1] : persistent cough  [de-identified] : 62 y/o F who comes in for persistent cough. Reports having cough since flu like symptoms : congestion, fever, chills in Feb,2024. Says other than cough, other symptoms resolved. Denies congestion, sore throat, fever, chills, headache or other upper respiratory symptoms.  Says cough due to itchiness in the throat. Trial of mucinex and benzonatate. Minimal help.

## 2024-03-20 NOTE — PHYSICAL EXAM
[No Acute Distress] : no acute distress [Normal Sclera/Conjunctiva] : normal sclera/conjunctiva [Well Developed] : well developed [EOMI] : extraocular movements intact [No Respiratory Distress] : no respiratory distress  [Clear to Auscultation] : lungs were clear to auscultation bilaterally [No Accessory Muscle Use] : no accessory muscle use [Regular Rhythm] : with a regular rhythm [Normal Rate] : normal rate  [No Murmur] : no murmur heard [Normal S1, S2] : normal S1 and S2 [Soft] : abdomen soft [No Edema] : there was no peripheral edema [Non Tender] : non-tender [Non-distended] : non-distended [Normal Bowel Sounds] : normal bowel sounds [de-identified] : cough

## 2024-03-22 LAB
RAPID RVP RESULT: NOT DETECTED
SARS-COV-2 RNA PNL RESP NAA+PROBE: NOT DETECTED

## 2024-04-02 ENCOUNTER — RX CHANGE (OUTPATIENT)
Age: 64
End: 2024-04-02

## 2024-04-02 ENCOUNTER — APPOINTMENT (OUTPATIENT)
Dept: FAMILY MEDICINE | Facility: HOSPITAL | Age: 64
End: 2024-04-02

## 2024-04-02 RX ORDER — FLUTICASONE PROPIONATE 50 UG/1
50 SPRAY, METERED NASAL TWICE DAILY
Qty: 1 | Refills: 2 | Status: DISCONTINUED | COMMUNITY
Start: 2024-03-20 | End: 2024-04-02

## 2024-04-24 NOTE — ED PROVIDER NOTE - NSCAREINITIATED _GEN_ER
Miranda Fowler(Resident) Additional Notes: We will treat with levulan once PA is obtained Detail Level: Simple Render Risk Assessment In Note?: no

## 2024-05-06 ENCOUNTER — APPOINTMENT (OUTPATIENT)
Dept: FAMILY MEDICINE | Facility: HOSPITAL | Age: 64
End: 2024-05-06

## 2024-05-06 ENCOUNTER — RESULT CHARGE (OUTPATIENT)
Age: 64
End: 2024-05-06

## 2024-05-06 ENCOUNTER — RESULT REVIEW (OUTPATIENT)
Age: 64
End: 2024-05-06

## 2024-05-06 ENCOUNTER — OUTPATIENT (OUTPATIENT)
Dept: OUTPATIENT SERVICES | Facility: HOSPITAL | Age: 64
LOS: 1 days | End: 2024-05-06
Payer: SELF-PAY

## 2024-05-06 VITALS
WEIGHT: 177 LBS | DIASTOLIC BLOOD PRESSURE: 85 MMHG | TEMPERATURE: 97.7 F | OXYGEN SATURATION: 98 % | SYSTOLIC BLOOD PRESSURE: 133 MMHG | RESPIRATION RATE: 17 BRPM | HEART RATE: 82 BPM | BODY MASS INDEX: 31.35 KG/M2

## 2024-05-06 DIAGNOSIS — Z00.00 ENCOUNTER FOR GENERAL ADULT MEDICAL EXAMINATION WITHOUT ABNORMAL FINDINGS: ICD-10-CM

## 2024-05-06 DIAGNOSIS — E11.65 TYPE 2 DIABETES MELLITUS WITH HYPERGLYCEMIA: ICD-10-CM

## 2024-05-06 DIAGNOSIS — Q24.5 MALFORMATION OF CORONARY VESSELS: ICD-10-CM

## 2024-05-06 DIAGNOSIS — C50.919 MALIGNANT NEOPLASM OF UNSPECIFIED SITE OF UNSPECIFIED FEMALE BREAST: ICD-10-CM

## 2024-05-06 DIAGNOSIS — Z82.49 FAMILY HISTORY OF ISCHEMIC HEART DISEASE AND OTHER DISEASES OF THE CIRCULATORY SYSTEM: ICD-10-CM

## 2024-05-06 DIAGNOSIS — Z12.11 ENCOUNTER FOR SCREENING FOR MALIGNANT NEOPLASM OF COLON: ICD-10-CM

## 2024-05-06 DIAGNOSIS — R19.00 INTRA-ABDOMINAL AND PELVIC SWELLING, MASS AND LUMP, UNSPECIFIED SITE: ICD-10-CM

## 2024-05-06 LAB — HBA1C MFR BLD HPLC: 8.9

## 2024-05-06 PROCEDURE — G0463: CPT

## 2024-05-06 RX ORDER — ATORVASTATIN CALCIUM 20 MG/1
20 TABLET, FILM COATED ORAL DAILY
Qty: 90 | Refills: 0 | Status: ACTIVE | COMMUNITY
Start: 2017-09-14 | End: 1900-01-01

## 2024-05-06 RX ORDER — FLUTICASONE PROPIONATE 50 UG/1
50 SPRAY, METERED NASAL
Qty: 48 | Refills: 1 | Status: DISCONTINUED | COMMUNITY
End: 2024-05-06

## 2024-05-06 RX ORDER — METOPROLOL SUCCINATE 25 MG/1
25 TABLET, EXTENDED RELEASE ORAL DAILY
Qty: 45 | Refills: 0 | Status: ACTIVE | COMMUNITY
Start: 2017-09-25 | End: 1900-01-01

## 2024-05-06 RX ORDER — GUAIFENESIN 600 MG/1
600 TABLET, EXTENDED RELEASE ORAL TWICE DAILY
Qty: 80 | Refills: 0 | Status: DISCONTINUED | COMMUNITY
Start: 2024-02-06 | End: 2024-05-06

## 2024-05-06 RX ORDER — ESCITALOPRAM OXALATE 5 MG/1
5 TABLET ORAL DAILY
Qty: 90 | Refills: 0 | Status: ACTIVE | COMMUNITY
Start: 2020-12-12 | End: 1900-01-01

## 2024-05-06 RX ORDER — LIDOCAINE HYDROCHLORIDE 20 MG/ML
2 SOLUTION ORAL; TOPICAL
Qty: 1 | Refills: 0 | Status: DISCONTINUED | COMMUNITY
Start: 2024-02-20 | End: 2024-05-06

## 2024-05-06 RX ORDER — BENZONATATE 100 MG/1
100 CAPSULE ORAL 3 TIMES DAILY
Qty: 30 | Refills: 0 | Status: DISCONTINUED | COMMUNITY
Start: 2024-02-06 | End: 2024-05-06

## 2024-05-07 NOTE — PHYSICAL EXAM
[No Acute Distress] : no acute distress [Well Nourished] : well nourished [Well Developed] : well developed [Well-Appearing] : well-appearing [No Respiratory Distress] : no respiratory distress  [No Accessory Muscle Use] : no accessory muscle use [Clear to Auscultation] : lungs were clear to auscultation bilaterally [Normal Rate] : normal rate  [Regular Rhythm] : with a regular rhythm [Normal S1, S2] : normal S1 and S2 [No Murmur] : no murmur heard [Soft] : abdomen soft [Non Tender] : non-tender [Non-distended] : non-distended [Normal Bowel Sounds] : normal bowel sounds [No Rash] : no rash [Coordination Grossly Intact] : coordination grossly intact [Normal Affect] : the affect was normal [Normal Insight/Judgement] : insight and judgment were intact [de-identified] : ventral hernia, reducible

## 2024-05-07 NOTE — REVIEW OF SYSTEMS
[Fever] : no fever [Chills] : no chills [Fatigue] : no fatigue [Vision Problems] : no vision problems [Earache] : no earache [Chest Pain] : no chest pain [Palpitations] : no palpitations [Shortness Of Breath] : no shortness of breath [Wheezing] : no wheezing [Cough] : no cough [Abdominal Pain] : no abdominal pain [Nausea] : no nausea [Constipation] : no constipation [Diarrhea] : diarrhea [Vomiting] : no vomiting [Joint Pain] : no joint pain [Muscle Pain] : no muscle pain [Skin Rash] : no skin rash [Headache] : no headache [Dizziness] : no dizziness [Anxiety] : no anxiety [Depression] : no depression [FreeTextEntry7] : abdominal hernia

## 2024-05-07 NOTE — HISTORY OF PRESENT ILLNESS
[FreeTextEntry1] : CPE [de-identified] : Pt is a 62 yo F with PMHx of hypertension, Diabetes Mellitus type 2, coronary artery bridging, breast cancer in remission presenting for CPE.  Checks blood sugars at home, typically 130s-140s, occasionally in 200 range. Admits to diet heavy in carbohydrates and minimal exercise.  Health maintenance: Dexa scan done 2022, recommend repeat in 5 years. Due 2027. Due for repeat colonoscopy. Pap smear done April 022, wnl. Due 2027. PPSV 2014. Mammogram scheduled for this Thursday.

## 2024-05-09 ENCOUNTER — APPOINTMENT (OUTPATIENT)
Dept: MAMMOGRAPHY | Facility: IMAGING CENTER | Age: 64
End: 2024-05-09
Payer: SELF-PAY

## 2024-05-09 ENCOUNTER — APPOINTMENT (OUTPATIENT)
Dept: ULTRASOUND IMAGING | Facility: IMAGING CENTER | Age: 64
End: 2024-05-09
Payer: SELF-PAY

## 2024-05-09 ENCOUNTER — RESULT REVIEW (OUTPATIENT)
Age: 64
End: 2024-05-09

## 2024-05-09 ENCOUNTER — OUTPATIENT (OUTPATIENT)
Dept: OUTPATIENT SERVICES | Facility: HOSPITAL | Age: 64
LOS: 1 days | End: 2024-05-09
Payer: SELF-PAY

## 2024-05-09 DIAGNOSIS — C50.919 MALIGNANT NEOPLASM OF UNSPECIFIED SITE OF UNSPECIFIED FEMALE BREAST: ICD-10-CM

## 2024-05-09 DIAGNOSIS — Z98.890 OTHER SPECIFIED POSTPROCEDURAL STATES: Chronic | ICD-10-CM

## 2024-05-09 PROCEDURE — 77063 BREAST TOMOSYNTHESIS BI: CPT | Mod: 26,52

## 2024-05-09 PROCEDURE — 77067 SCR MAMMO BI INCL CAD: CPT

## 2024-05-09 PROCEDURE — 77067 SCR MAMMO BI INCL CAD: CPT | Mod: 26,LT,52

## 2024-05-09 PROCEDURE — 77063 BREAST TOMOSYNTHESIS BI: CPT

## 2024-05-29 ENCOUNTER — APPOINTMENT (OUTPATIENT)
Dept: SURGERY | Facility: HOSPITAL | Age: 64
End: 2024-05-29

## 2024-06-04 ENCOUNTER — NON-APPOINTMENT (OUTPATIENT)
Age: 64
End: 2024-06-04

## 2024-06-10 ENCOUNTER — APPOINTMENT (OUTPATIENT)
Dept: OBGYN | Facility: CLINIC | Age: 64
End: 2024-06-10
Payer: COMMERCIAL

## 2024-06-10 ENCOUNTER — OUTPATIENT (OUTPATIENT)
Dept: OUTPATIENT SERVICES | Facility: HOSPITAL | Age: 64
LOS: 1 days | End: 2024-06-10
Payer: SELF-PAY

## 2024-06-10 VITALS — SYSTOLIC BLOOD PRESSURE: 128 MMHG | WEIGHT: 172.4 LBS | DIASTOLIC BLOOD PRESSURE: 88 MMHG | BODY MASS INDEX: 30.54 KG/M2

## 2024-06-10 DIAGNOSIS — N76.0 ACUTE VAGINITIS: ICD-10-CM

## 2024-06-10 DIAGNOSIS — Z01.419 ENCOUNTER FOR GYNECOLOGICAL EXAMINATION (GENERAL) (ROUTINE) W/OUT ABNORMAL FINDINGS: ICD-10-CM

## 2024-06-10 DIAGNOSIS — Z98.890 OTHER SPECIFIED POSTPROCEDURAL STATES: Chronic | ICD-10-CM

## 2024-06-10 PROCEDURE — G0463: CPT

## 2024-06-10 PROCEDURE — 99396 PREV VISIT EST AGE 40-64: CPT | Mod: GC

## 2024-06-10 NOTE — PHYSICAL EXAM
[Chaperone Present] : A chaperone was present in the examining room during all aspects of the physical examination [[id number]] : A chaperone was present during the pelvic exam. [Appropriately responsive] : appropriately responsive [Alert] : alert [No Acute Distress] : no acute distress [Vulvar Atrophy] : vulvar atrophy [Labia Majora] : normal [Labia Minora] : normal [Atrophy] : atrophy [Normal] : normal [Uterine Adnexae] : normal

## 2024-06-12 ENCOUNTER — OUTPATIENT (OUTPATIENT)
Dept: OUTPATIENT SERVICES | Facility: HOSPITAL | Age: 64
LOS: 1 days | End: 2024-06-12
Payer: SELF-PAY

## 2024-06-12 ENCOUNTER — APPOINTMENT (OUTPATIENT)
Dept: FAMILY MEDICINE | Facility: HOSPITAL | Age: 64
End: 2024-06-12

## 2024-06-12 VITALS
DIASTOLIC BLOOD PRESSURE: 75 MMHG | WEIGHT: 172 LBS | BODY MASS INDEX: 30.47 KG/M2 | HEART RATE: 82 BPM | SYSTOLIC BLOOD PRESSURE: 116 MMHG | RESPIRATION RATE: 61 BRPM | TEMPERATURE: 98 F | OXYGEN SATURATION: 96 %

## 2024-06-12 DIAGNOSIS — E11.9 TYPE 2 DIABETES MELLITUS W/OUT COMPLICATIONS: ICD-10-CM

## 2024-06-12 DIAGNOSIS — Z00.00 ENCOUNTER FOR GENERAL ADULT MEDICAL EXAMINATION WITHOUT ABNORMAL FINDINGS: ICD-10-CM

## 2024-06-12 DIAGNOSIS — I10 ESSENTIAL (PRIMARY) HYPERTENSION: ICD-10-CM

## 2024-06-12 DIAGNOSIS — E11.9 TYPE 2 DIABETES MELLITUS WITHOUT COMPLICATIONS: ICD-10-CM

## 2024-06-12 DIAGNOSIS — K43.9 VENTRAL HERNIA W/OUT OBSTRUCTION OR GANGRENE: ICD-10-CM

## 2024-06-12 DIAGNOSIS — Z98.890 OTHER SPECIFIED POSTPROCEDURAL STATES: Chronic | ICD-10-CM

## 2024-06-12 PROCEDURE — G0463: CPT

## 2024-06-12 NOTE — PHYSICAL EXAM
[No Acute Distress] : no acute distress [Well Nourished] : well nourished [Well Developed] : well developed [Well-Appearing] : well-appearing [No Respiratory Distress] : no respiratory distress  [No Accessory Muscle Use] : no accessory muscle use [Clear to Auscultation] : lungs were clear to auscultation bilaterally [Normal Rate] : normal rate  [Regular Rhythm] : with a regular rhythm [Normal S1, S2] : normal S1 and S2 [Soft] : abdomen soft [Non Tender] : non-tender [Non-distended] : non-distended [Normal Bowel Sounds] : normal bowel sounds [Coordination Grossly Intact] : coordination grossly intact [Normal Affect] : the affect was normal [Normal Insight/Judgement] : insight and judgment were intact [Comprehensive Foot Exam Normal] : Right and left foot were examined and both feet are normal. No ulcers in either foot. Toes are normal and with full ROM.  Normal tactile sensation with monofilament testing throughout both feet

## 2024-06-13 NOTE — HISTORY OF PRESENT ILLNESS
[FreeTextEntry1] :  62 yo postmenopausal (LMP )  PMSH breast cancer s/p R mastectomy and reconstruction, chemo (ER+/MT+/HER2-, AC->Tx4 completed 2007 + 5yr Tamoxifen); T2DM; heart palpitations; hypercholesterolemia presenting for annual visit. Patient was previously seen in booking clinic to discuss possible surgical management of fibroids however reports pain is well controlled with NSAIDs prn and she is no longer interested. Closely follows with Heme/Onc for breast exams and breast care. Denies postmenopausal bleeding, changes in bowel/bladder habits, bloating, nausea/vomiting, CP, SOB, HA, unintentional weight loss/gain.  HCM Pap (2022): NILM, neg HRHPV Mammo (): Left, BIRADS-2 Colonoscopy (): nonbleeding internal hemorrhoids, diverticulosis in sigmoid colon; repeat in 5 years. due for colonoscopy, referral given by PCP DEXA (2022): normal, repeat in 5 years  OBHx: 1x  (1985, 7#), 1x MAB s/p D+C GYNHx: +fibroids; denies ov cysts, STIs, abnl Pap smears  PMSH breast cancer s/p R mastectomy and reconstruction, chemo (no genetic testing); T2DM (A1C 7.4%); heart palpitations; hypercholesterolemia; appendectomy (15yo); right leg varicosities; D+C x1; bladder prolapse surgery  Meds: Metformin 1000mg BID, Metoprolol 25mg qD, Atorvastatin daily, Escitalopram 5mg qD All: NKDA  Soc: denies T/E/D Psych: depression  FHx: breast cancer (sister), colon cancer (mother)

## 2024-06-13 NOTE — PLAN
[FreeTextEntry1] :  60 yo postmenopausal (LMP )  PMSH breast cancer s/p R mastectomy and reconstruction, chemo (ER+/SC+/HER2-, AC->Tx4 completed 2007 + 5yr Tamoxifen); T2DM; heart palpitations; hypercholesterolemia presenting for annual visit. Patient was previously seen in booking clinic to discuss possible surgical management of fibroids however reports pain is well controlled with NSAIDs prn and she is no longer interested. Closely follows with Heme/Onc for breast exams and breast care. Pelvic exam normal today and patient without acute complaints.  #Health maintenance - UTD aside from colonoscopy, patient given referral from PCP and instructed to f/u with GI especially with family history of colon cancer in her mother - Patient declines STD testing today - Bimanual exam wnl aside from vulvovaginal atrophy, is not bothersome to patient at this time - RTC in 1 yr or PRN  d/w Dr. Sonia Fox, PGY-1

## 2024-06-14 DIAGNOSIS — Z01.419 ENCOUNTER FOR GYNECOLOGICAL EXAMINATION (GENERAL) (ROUTINE) WITHOUT ABNORMAL FINDINGS: ICD-10-CM

## 2024-06-14 NOTE — REVIEW OF SYSTEMS
[Fever] : no fever [Chills] : no chills [Fatigue] : no fatigue [Chest Pain] : no chest pain [Palpitations] : no palpitations [Shortness Of Breath] : no shortness of breath [Wheezing] : no wheezing [Cough] : no cough [Abdominal Pain] : no abdominal pain [Nausea] : no nausea [Constipation] : no constipation [Diarrhea] : diarrhea [Vomiting] : no vomiting

## 2024-06-14 NOTE — HISTORY OF PRESENT ILLNESS
[FreeTextEntry1] : f/up [de-identified] : Pt is a 62 yo F with PMHx of hypertension, type II diabetes, coronary artery bridging, breast cancer presenting for f/up. Patient has been implementing lifestyle changes to help manage diabetes and hypertension. Takes metformin 1000mg bid, metoprolol 12.5 mg qd, and atorvastatin 20mg. States that she takes them all every day. Pt reports recent intended 12-pound weight loss. Congratulated patient on progress. Sugars at home typically 100s-120s.  Patient also has a ventral hernia, requests surgery consultation.

## 2024-07-03 ENCOUNTER — OUTPATIENT (OUTPATIENT)
Dept: OUTPATIENT SERVICES | Facility: HOSPITAL | Age: 64
LOS: 1 days | Discharge: HOME | End: 2024-07-03

## 2024-07-03 DIAGNOSIS — C50.919 MALIGNANT NEOPLASM OF UNSPECIFIED SITE OF UNSPECIFIED FEMALE BREAST: ICD-10-CM

## 2024-07-08 ENCOUNTER — APPOINTMENT (OUTPATIENT)
Dept: HEMATOLOGY ONCOLOGY | Facility: CLINIC | Age: 64
End: 2024-07-08

## 2024-07-09 NOTE — ED PROVIDER NOTE - NS ED ROS FT
GENERAL: No fever, chills  EYES: no vision changes, no discharge.   ENT: no difficulty swallowing or speaking   CARDIAC: no chest pain/pressure, + SOB, no lower extremity swelling  PULMONARY: no cough, + SOB  GI: no abdominal pain, n/v/d  : no dysuria, no hematuria  SKIN: no rashes, no ecchymosis  NEURO: + bilateral arm numbness, no headache, lightheadedness  MSK: No joint pain, myalgia, weakness. wife

## 2024-07-10 ENCOUNTER — OUTPATIENT (OUTPATIENT)
Dept: OUTPATIENT SERVICES | Facility: HOSPITAL | Age: 64
LOS: 1 days | End: 2024-07-10
Payer: SELF-PAY

## 2024-07-10 ENCOUNTER — APPOINTMENT (OUTPATIENT)
Dept: SURGERY | Facility: HOSPITAL | Age: 64
End: 2024-07-10

## 2024-07-10 VITALS
DIASTOLIC BLOOD PRESSURE: 85 MMHG | RESPIRATION RATE: 16 BRPM | BODY MASS INDEX: 30.11 KG/M2 | OXYGEN SATURATION: 97 % | HEART RATE: 77 BPM | SYSTOLIC BLOOD PRESSURE: 130 MMHG | TEMPERATURE: 98 F | WEIGHT: 170 LBS

## 2024-07-10 DIAGNOSIS — Z87.09 PERSONAL HISTORY OF OTHER DISEASES OF THE RESPIRATORY SYSTEM: ICD-10-CM

## 2024-07-10 DIAGNOSIS — K40.30 UNILATERAL INGUINAL HERNIA, WITH OBSTRUCTION, WITHOUT GANGRENE, NOT SPECIFIED AS RECURRENT: ICD-10-CM

## 2024-07-10 DIAGNOSIS — K40.30 UNILATERAL INGUINAL HERNIA, WITH OBSTRUCTION, W/OUT GANGRENE, NOT SPECIFIED AS RECURRENT: ICD-10-CM

## 2024-07-10 DIAGNOSIS — R19.00 INTRA-ABDOMINAL AND PELVIC SWELLING, MASS AND LUMP, UNSPECIFIED SITE: ICD-10-CM

## 2024-07-10 DIAGNOSIS — Z23 ENCOUNTER FOR IMMUNIZATION: ICD-10-CM

## 2024-07-10 DIAGNOSIS — H00.019 HORDEOLUM EXTERNUM UNSPECIFIED EYE, UNSPECIFIED EYELID: ICD-10-CM

## 2024-07-10 DIAGNOSIS — S09.90XA UNSPECIFIED INJURY OF HEAD, INITIAL ENCOUNTER: ICD-10-CM

## 2024-07-10 DIAGNOSIS — Z11.52 ENCOUNTER FOR SCREENING FOR COVID-19: ICD-10-CM

## 2024-07-10 DIAGNOSIS — Z00.00 ENCOUNTER FOR GENERAL ADULT MEDICAL EXAMINATION WITHOUT ABNORMAL FINDINGS: ICD-10-CM

## 2024-07-10 PROCEDURE — G0463: CPT

## 2024-07-12 ENCOUNTER — RESULT REVIEW (OUTPATIENT)
Age: 64
End: 2024-07-12

## 2024-07-12 ENCOUNTER — OUTPATIENT (OUTPATIENT)
Dept: OUTPATIENT SERVICES | Facility: HOSPITAL | Age: 64
LOS: 1 days | End: 2024-07-12
Payer: SELF-PAY

## 2024-07-12 DIAGNOSIS — Z00.00 ENCOUNTER FOR GENERAL ADULT MEDICAL EXAMINATION WITHOUT ABNORMAL FINDINGS: ICD-10-CM

## 2024-07-12 DIAGNOSIS — Z98.890 OTHER SPECIFIED POSTPROCEDURAL STATES: Chronic | ICD-10-CM

## 2024-07-12 DIAGNOSIS — K40.30 UNILATERAL INGUINAL HERNIA, WITH OBSTRUCTION, WITHOUT GANGRENE, NOT SPECIFIED AS RECURRENT: ICD-10-CM

## 2024-07-12 PROCEDURE — 74176 CT ABD & PELVIS W/O CONTRAST: CPT | Mod: 26

## 2024-07-12 PROCEDURE — 74176 CT ABD & PELVIS W/O CONTRAST: CPT

## 2024-07-30 PROBLEM — K43.2 INCISIONAL HERNIA, WITHOUT OBSTRUCTION OR GANGRENE: Status: ACTIVE | Noted: 2024-07-30

## 2024-07-31 ENCOUNTER — APPOINTMENT (OUTPATIENT)
Dept: SURGERY | Facility: HOSPITAL | Age: 64
End: 2024-07-31

## 2024-07-31 ENCOUNTER — NON-APPOINTMENT (OUTPATIENT)
Age: 64
End: 2024-07-31

## 2024-07-31 DIAGNOSIS — K43.2 INCISIONAL HERNIA W/OUT OBSTRUCTION OR GANGRENE: ICD-10-CM

## 2024-08-02 ENCOUNTER — NON-APPOINTMENT (OUTPATIENT)
Age: 64
End: 2024-08-02

## 2024-08-08 ENCOUNTER — NON-APPOINTMENT (OUTPATIENT)
Age: 64
End: 2024-08-08

## 2024-08-12 ENCOUNTER — APPOINTMENT (OUTPATIENT)
Dept: HEMATOLOGY ONCOLOGY | Facility: CLINIC | Age: 64
End: 2024-08-12
Payer: COMMERCIAL

## 2024-08-12 DIAGNOSIS — C50.919 MALIGNANT NEOPLASM OF UNSPECIFIED SITE OF UNSPECIFIED FEMALE BREAST: ICD-10-CM

## 2024-08-12 PROCEDURE — G2211 COMPLEX E/M VISIT ADD ON: CPT

## 2024-08-12 PROCEDURE — 99214 OFFICE O/P EST MOD 30 MIN: CPT

## 2024-08-17 NOTE — END OF VISIT
[] : Fellow [FreeTextEntry3] : Pt with history of breast cancer without any new signs or symptoms of breast cancer recurrence from history or exam. She will continue with surveillance. Questions answered to her satisfaction. She is agreeable with plan. Next follow up in 1 year but earlier if any new symptoms. [Time Spent: ___ minutes] : I have spent [unfilled] minutes of time on the encounter.

## 2024-08-17 NOTE — HISTORY OF PRESENT ILLNESS
[de-identified] : Age 46: Stage 2 lobular breast cancer (ER+/[place]+/Her2-) diagnosed in July 2006 s/p right mastectomy ([facility]) and reconstruction (2008, [facility]) treated with 8 cycles of adriamycin + cyclophosphamide then Taxol as well as XRT (completed May 2007), then tamoxifen x 5 years (completed 2012).   Disease: right breast cancer Pathology: invasive lobular carcinoma ER/ [place] positive [id number] negative AJCC Stage: II.   [de-identified] : 8/12/24: She has no new symptoms. Denies palpitation, SOB, bone pain, cough, fever/chills, changes with urination and bowels. R shoulder pain is resolved. She reports hernia pending further eval by surgery. Reviewed mammogram of the L breast in 5/2024: No mammographic evidence of malignancy; BI-RADS 2.

## 2024-08-17 NOTE — HISTORY OF PRESENT ILLNESS
[de-identified] : Age 46: Stage 2 lobular breast cancer (ER+/DC+/Her2-) diagnosed in July 2006 s/p right mastectomy ([facility]) and reconstruction (2008, [facility]) treated with 8 cycles of adriamycin + cyclophosphamide then Taxol as well as XRT (completed May 2007), then tamoxifen x 5 years (completed 2012).   Disease: right breast cancer Pathology: invasive lobular carcinoma ER/ DC positive [id number] negative AJCC Stage: II.   [de-identified] : 8/12/24: She has no new symptoms. Denies palpitation, SOB, bone pain, cough, fever/chills, changes with urination and bowels. R shoulder pain is resolved. She reports hernia pending further eval by surgery. Reviewed mammogram of the L breast in 5/2024: No mammographic evidence of malignancy; BI-RADS 2.

## 2024-08-17 NOTE — REVIEW OF SYSTEMS
[Diarrhea: Grade 0] : Diarrhea: Grade 0 [Negative] : Allergic/Immunologic [Eye Pain] : no eye pain [Vision Problems] : no vision problems

## 2024-08-17 NOTE — END OF VISIT
[] : Fellow [FreeTextEntry3] : Pt with history of breast cancer without any new signs or symptoms of breast cancer recurrence from history or exam. She will continue with surveillance. Questions answered to her satisfaction. She is agreeable with plan. Next follow up in 1 year but earlier if any new symptoms. [Time Spent: ___ minutes] : I have spent [unfilled] minutes of time on the encounter. [] : results reviewed

## 2024-08-17 NOTE — HISTORY OF PRESENT ILLNESS
[de-identified] : Age 46: Stage 2 lobular breast cancer (ER+/MI+/Her2-) diagnosed in July 2006 s/p right mastectomy ([facility]) and reconstruction (2008, [facility]) treated with 8 cycles of adriamycin + cyclophosphamide then Taxol as well as XRT (completed May 2007), then tamoxifen x 5 years (completed 2012).   Disease: right breast cancer Pathology: invasive lobular carcinoma ER/ MI positive [id number] negative AJCC Stage: II.   [de-identified] : 8/12/24: She has no new symptoms. Denies palpitation, SOB, bone pain, cough, fever/chills, changes with urination and bowels. R shoulder pain is resolved. She reports hernia pending further eval by surgery. Reviewed mammogram of the L breast in 5/2024: No mammographic evidence of malignancy; BI-RADS 2.

## 2024-08-17 NOTE — ASSESSMENT
[FreeTextEntry1] : Malignant neoplasm of breast (174.9) (C50.919) She is a 63 y/o  F with Stage II invasive lobular carcinoma in 2006. We reviewed signs and symptoms of breast cancer recurrence. She has no new signs or symptoms. Reviewed mammogram of the L breast 5/2024: No mammographic evidence of malignancy; BI-RADS 2. DEXA scan normal in 2022. Reviewed blood work from 2/2024 with CMP WNL except hyperglycemia (HbA1C 8.9 in May 2024) she will f/u PCP for DM management and lab f/u. Mammo/sono ordered during this visit. Health care proxy form given to pt. Next follow up in 1 year but earlier if any new symptoms.     Discussed with Dr. Berlin Moy PGY6 hem&onc fellow

## 2024-08-20 ENCOUNTER — APPOINTMENT (OUTPATIENT)
Dept: FAMILY MEDICINE | Facility: HOSPITAL | Age: 64
End: 2024-08-20

## 2024-08-20 ENCOUNTER — OUTPATIENT (OUTPATIENT)
Dept: OUTPATIENT SERVICES | Facility: HOSPITAL | Age: 64
LOS: 1 days | End: 2024-08-20
Payer: SELF-PAY

## 2024-08-20 VITALS
SYSTOLIC BLOOD PRESSURE: 123 MMHG | HEART RATE: 77 BPM | TEMPERATURE: 97.5 F | OXYGEN SATURATION: 97 % | WEIGHT: 168 LBS | BODY MASS INDEX: 29.77 KG/M2 | HEIGHT: 63 IN | RESPIRATION RATE: 14 BRPM | DIASTOLIC BLOOD PRESSURE: 76 MMHG

## 2024-08-20 DIAGNOSIS — Q24.5 MALFORMATION OF CORONARY VESSELS: ICD-10-CM

## 2024-08-20 DIAGNOSIS — E11.9 TYPE 2 DIABETES MELLITUS W/OUT COMPLICATIONS: ICD-10-CM

## 2024-08-20 DIAGNOSIS — Z98.890 OTHER SPECIFIED POSTPROCEDURAL STATES: Chronic | ICD-10-CM

## 2024-08-20 DIAGNOSIS — D12.6 BENIGN NEOPLASM OF COLON, UNSPECIFIED: ICD-10-CM

## 2024-08-20 DIAGNOSIS — E11.9 TYPE 2 DIABETES MELLITUS WITHOUT COMPLICATIONS: ICD-10-CM

## 2024-08-20 DIAGNOSIS — Z12.11 ENCOUNTER FOR SCREENING FOR MALIGNANT NEOPLASM OF COLON: ICD-10-CM

## 2024-08-20 DIAGNOSIS — Z00.00 ENCOUNTER FOR GENERAL ADULT MEDICAL EXAMINATION WITHOUT ABNORMAL FINDINGS: ICD-10-CM

## 2024-08-20 LAB
CREAT SPEC-SCNC: 147 MG/DL
HBA1C MFR BLD HPLC: 6.7
MICROALBUMIN 24H UR DL<=1MG/L-MCNC: 4.3 MG/DL
MICROALBUMIN/CREAT 24H UR-RTO: 29 MG/G

## 2024-08-20 PROCEDURE — 82043 UR ALBUMIN QUANTITATIVE: CPT

## 2024-08-20 PROCEDURE — 83036 HEMOGLOBIN GLYCOSYLATED A1C: CPT

## 2024-08-20 PROCEDURE — G0463: CPT

## 2024-08-21 NOTE — PHYSICAL EXAM
[Well Nourished] : well nourished [PERRL] : pupils equal round and reactive to light [EOMI] : extraocular movements intact [No Lymphadenopathy] : no lymphadenopathy [No Respiratory Distress] : no respiratory distress  [Clear to Auscultation] : lungs were clear to auscultation bilaterally [Normal Rate] : normal rate  [Normal S1, S2] : normal S1 and S2 [Soft] : abdomen soft [Non Tender] : non-tender [Non-distended] : non-distended [Normal Supraclavicular Nodes] : no supraclavicular lymphadenopathy [Normal Posterior Cervical Nodes] : no posterior cervical lymphadenopathy [Normal Anterior Cervical Nodes] : no anterior cervical lymphadenopathy [No Rash] : no rash [Coordination Grossly Intact] : coordination grossly intact [Normal Gait] : normal gait [Normal Affect] : the affect was normal [Normal Insight/Judgement] : insight and judgment were intact

## 2024-08-22 NOTE — HISTORY OF PRESENT ILLNESS
[FreeTextEntry1] : T2DM follow up [de-identified] : A 64-year-old female with a medical history of type 2 diabetes, GERD, obesity, and breast cancer presented today for a follow-up of her diabetes. Her last HbA1c on May 6 was 8.9. The patient's diabetes management includes taking 1000 mg of metformin twice daily. She reports adherence to a strict dietary regimen and has reduced her carbohydrate intake, which has resulted in significant intentional weight loss. Her HbA1c level today is 6.7. She is under hematology follow-up due to a diagnosis of invasive lobular carcinoma in 2006, which is now in remission. Additionally, the patient has a left anterior myocardial bridge. Her echocardiogram unchanged from prior and nuclear medicine stress test done a few years back showed non-specific ST changes. She is on BB which she will continue. Patient also has a history of tubular adenoma diagnosed in 2014 with repeat colonoscopy in 2018 unremarkable - recommendation was to follow up with a colonoscopy in 5 yrs so patient is due for it. She has no other complaints today.

## 2024-08-22 NOTE — HISTORY OF PRESENT ILLNESS
[FreeTextEntry1] : T2DM follow up [de-identified] : A 64-year-old female with a medical history of type 2 diabetes, GERD, obesity, and breast cancer presented today for a follow-up of her diabetes. Her last HbA1c on May 6 was 8.9. The patient's diabetes management includes taking 1000 mg of metformin twice daily. She reports adherence to a strict dietary regimen and has reduced her carbohydrate intake, which has resulted in significant intentional weight loss. Her HbA1c level today is 6.7. She is under hematology follow-up due to a diagnosis of invasive lobular carcinoma in 2006, which is now in remission. Additionally, the patient has a left anterior myocardial bridge. Her echocardiogram unchanged from prior and nuclear medicine stress test done a few years back showed non-specific ST changes. She is on BB which she will continue. Patient also has a history of tubular adenoma diagnosed in 2014 with repeat colonoscopy in 2018 unremarkable - recommendation was to follow up with a colonoscopy in 5 yrs so patient is due for it. She has no other complaints today.

## 2024-08-22 NOTE — PLAN
[FreeTextEntry1] : # T2DM  - Much better  - c/w Metformin as prescribed  - c/w Statin for micro and macrovascular benefits  - Albumin/Cr ratio ordered  - Congratulated the patient on weight loss  - RTC 3 months for a follow up visit   # Colon cancer screen  - Has a history of tubular adenoma  - Send back to GI for a colonoscopy which she is due for   # Myocardial bridging  - c/w BB  - follow up with cardiology   RTC as above   Case d/w Dr. Adan

## 2024-08-22 NOTE — HISTORY OF PRESENT ILLNESS
[FreeTextEntry1] : T2DM follow up [de-identified] : A 64-year-old female with a medical history of type 2 diabetes, GERD, obesity, and breast cancer presented today for a follow-up of her diabetes. Her last HbA1c on May 6 was 8.9. The patient's diabetes management includes taking 1000 mg of metformin twice daily. She reports adherence to a strict dietary regimen and has reduced her carbohydrate intake, which has resulted in significant intentional weight loss. Her HbA1c level today is 6.7. She is under hematology follow-up due to a diagnosis of invasive lobular carcinoma in 2006, which is now in remission. Additionally, the patient has a left anterior myocardial bridge. Her echocardiogram unchanged from prior and nuclear medicine stress test done a few years back showed non-specific ST changes. She is on BB which she will continue. Patient also has a history of tubular adenoma diagnosed in 2014 with repeat colonoscopy in 2018 unremarkable - recommendation was to follow up with a colonoscopy in 5 yrs so patient is due for it. She has no other complaints today.

## 2024-12-04 ENCOUNTER — APPOINTMENT (OUTPATIENT)
Dept: SURGERY | Facility: HOSPITAL | Age: 64
End: 2024-12-04

## 2024-12-04 ENCOUNTER — OUTPATIENT (OUTPATIENT)
Dept: OUTPATIENT SERVICES | Facility: HOSPITAL | Age: 64
LOS: 1 days | End: 2024-12-04
Payer: SELF-PAY

## 2024-12-04 DIAGNOSIS — K40.30 UNILATERAL INGUINAL HERNIA, WITH OBSTRUCTION, W/OUT GANGRENE, NOT SPECIFIED AS RECURRENT: ICD-10-CM

## 2024-12-04 DIAGNOSIS — K43.2 INCISIONAL HERNIA WITHOUT OBSTRUCTION OR GANGRENE: ICD-10-CM

## 2024-12-04 DIAGNOSIS — K43.2 INCISIONAL HERNIA W/OUT OBSTRUCTION OR GANGRENE: ICD-10-CM

## 2024-12-04 DIAGNOSIS — Z00.00 ENCOUNTER FOR GENERAL ADULT MEDICAL EXAMINATION WITHOUT ABNORMAL FINDINGS: ICD-10-CM

## 2024-12-04 DIAGNOSIS — Z98.890 OTHER SPECIFIED POSTPROCEDURAL STATES: Chronic | ICD-10-CM

## 2024-12-04 PROCEDURE — G0463: CPT

## 2024-12-04 NOTE — ED ADULT NURSE NOTE - PLAN OF CARE DISCUSSED WITH:
[de-identified] : I remove the K wire in its entirety.  The patient tolerated it well.  We discussed advancing her weightbearing in a boot.  She will still utilize a toe spacer and she will stretch the second toe down.  I will see her back in 4 weeks for reassessment.  All questions answered. Patient

## 2025-01-20 NOTE — ED ADULT NURSE NOTE - PERIPHERAL VASCULAR
Medication: sumatriptan (IMITREX) 100 MG tablet  passed protocol.   Last office visit date: 2/7/2024  Next appointment scheduled?: Yes   Number of refills given: 10 tab / 3 refills   WDL

## 2025-02-03 ENCOUNTER — APPOINTMENT (OUTPATIENT)
Age: 65
End: 2025-02-03

## 2025-02-03 ENCOUNTER — OUTPATIENT (OUTPATIENT)
Dept: OUTPATIENT SERVICES | Facility: HOSPITAL | Age: 65
LOS: 1 days | End: 2025-02-03
Payer: SELF-PAY

## 2025-02-03 VITALS
BODY MASS INDEX: 30.48 KG/M2 | OXYGEN SATURATION: 97 % | SYSTOLIC BLOOD PRESSURE: 147 MMHG | RESPIRATION RATE: 14 BRPM | HEART RATE: 86 BPM | HEIGHT: 63 IN | DIASTOLIC BLOOD PRESSURE: 78 MMHG | WEIGHT: 172 LBS | TEMPERATURE: 98.3 F

## 2025-02-03 VITALS — SYSTOLIC BLOOD PRESSURE: 132 MMHG | DIASTOLIC BLOOD PRESSURE: 80 MMHG

## 2025-02-03 DIAGNOSIS — E11.9 TYPE 2 DIABETES MELLITUS WITHOUT COMPLICATIONS: ICD-10-CM

## 2025-02-03 DIAGNOSIS — R03.0 ELEVATED BLOOD-PRESSURE READING, WITHOUT DIAGNOSIS OF HYPERTENSION: ICD-10-CM

## 2025-02-03 DIAGNOSIS — Z98.890 OTHER SPECIFIED POSTPROCEDURAL STATES: Chronic | ICD-10-CM

## 2025-02-03 DIAGNOSIS — Z00.00 ENCOUNTER FOR GENERAL ADULT MEDICAL EXAMINATION WITHOUT ABNORMAL FINDINGS: ICD-10-CM

## 2025-02-03 DIAGNOSIS — R03.0 ELEVATED BLOOD-PRESSURE READING, W/OUT DIAGNOSIS OF HYPERTENSION: ICD-10-CM

## 2025-02-03 DIAGNOSIS — E11.9 TYPE 2 DIABETES MELLITUS W/OUT COMPLICATIONS: ICD-10-CM

## 2025-02-03 DIAGNOSIS — I10 ESSENTIAL (PRIMARY) HYPERTENSION: ICD-10-CM

## 2025-02-03 DIAGNOSIS — Z71.89 OTHER SPECIFIED COUNSELING: ICD-10-CM

## 2025-02-04 PROBLEM — Z71.89 ENCOUNTER FOR HERB AND VITAMIN SUPPLEMENT MANAGEMENT: Status: ACTIVE | Noted: 2025-02-04

## 2025-02-04 PROBLEM — R03.0 ELEVATED BLOOD PRESSURE READING: Status: ACTIVE | Noted: 2025-02-03

## 2025-02-05 ENCOUNTER — OUTPATIENT (OUTPATIENT)
Dept: OUTPATIENT SERVICES | Facility: HOSPITAL | Age: 65
LOS: 1 days | End: 2025-02-05
Payer: SELF-PAY

## 2025-02-05 ENCOUNTER — APPOINTMENT (OUTPATIENT)
Dept: CARDIOLOGY | Facility: HOSPITAL | Age: 65
End: 2025-02-05

## 2025-02-05 VITALS
HEART RATE: 84 BPM | OXYGEN SATURATION: 96 % | BODY MASS INDEX: 30.29 KG/M2 | WEIGHT: 171 LBS | DIASTOLIC BLOOD PRESSURE: 80 MMHG | SYSTOLIC BLOOD PRESSURE: 130 MMHG

## 2025-02-05 DIAGNOSIS — I25.10 ATHEROSCLEROTIC HEART DISEASE OF NATIVE CORONARY ARTERY WITHOUT ANGINA PECTORIS: ICD-10-CM

## 2025-02-05 DIAGNOSIS — Z98.890 OTHER SPECIFIED POSTPROCEDURAL STATES: Chronic | ICD-10-CM

## 2025-02-05 LAB
ALBUMIN SERPL ELPH-MCNC: 4.5 G/DL
ALP BLD-CCNC: 90 U/L
ALT SERPL-CCNC: 15 U/L
ANION GAP SERPL CALC-SCNC: 10 MMOL/L
AST SERPL-CCNC: 19 U/L
BASOPHILS # BLD AUTO: 0.03 K/UL
BASOPHILS NFR BLD AUTO: 0.6 %
BILIRUB SERPL-MCNC: 0.6 MG/DL
BUN SERPL-MCNC: 16 MG/DL
CALCIUM SERPL-MCNC: 9.2 MG/DL
CHLORIDE SERPL-SCNC: 104 MMOL/L
CHOLEST SERPL-MCNC: 154 MG/DL
CO2 SERPL-SCNC: 28 MMOL/L
CREAT SERPL-MCNC: 0.69 MG/DL
EGFR: 97 ML/MIN/1.73M2
EOSINOPHIL # BLD AUTO: 0.06 K/UL
EOSINOPHIL NFR BLD AUTO: 1.2 %
GLUCOSE SERPL-MCNC: 161 MG/DL
HBA1C MFR BLD HPLC: ABNORMAL
HCT VFR BLD CALC: 39.7 %
HDLC SERPL-MCNC: 77 MG/DL
HGB BLD-MCNC: 13.4 G/DL
IMM GRANULOCYTES NFR BLD AUTO: 0.2 %
LDLC SERPL CALC-MCNC: 64 MG/DL
LYMPHOCYTES # BLD AUTO: 1.92 K/UL
LYMPHOCYTES NFR BLD AUTO: 37.1 %
MAN DIFF?: NORMAL
MCHC RBC-ENTMCNC: 29.6 PG
MCHC RBC-ENTMCNC: 33.8 G/DL
MCV RBC AUTO: 87.6 FL
MONOCYTES # BLD AUTO: 0.45 K/UL
MONOCYTES NFR BLD AUTO: 8.7 %
NEUTROPHILS # BLD AUTO: 2.7 K/UL
NEUTROPHILS NFR BLD AUTO: 52.2 %
NONHDLC SERPL-MCNC: 77 MG/DL
PLATELET # BLD AUTO: 215 K/UL
POTASSIUM SERPL-SCNC: 4.3 MMOL/L
PROT SERPL-MCNC: 7 G/DL
RBC # BLD: 4.53 M/UL
RBC # FLD: 13.6 %
SODIUM SERPL-SCNC: 143 MMOL/L
TRIGL SERPL-MCNC: 64 MG/DL
WBC # FLD AUTO: 5.17 K/UL

## 2025-02-05 PROCEDURE — 80053 COMPREHEN METABOLIC PANEL: CPT

## 2025-02-05 PROCEDURE — 93005 ELECTROCARDIOGRAM TRACING: CPT

## 2025-02-05 PROCEDURE — G0463: CPT

## 2025-02-05 PROCEDURE — 85025 COMPLETE CBC W/AUTO DIFF WBC: CPT

## 2025-02-05 PROCEDURE — 80061 LIPID PANEL: CPT

## 2025-02-12 DIAGNOSIS — R03.0 ELEVATED BLOOD-PRESSURE READING, WITHOUT DIAGNOSIS OF HYPERTENSION: ICD-10-CM

## 2025-02-17 ENCOUNTER — EMERGENCY (EMERGENCY)
Facility: HOSPITAL | Age: 65
LOS: 1 days | Discharge: HOME | End: 2025-02-17
Attending: EMERGENCY MEDICINE | Admitting: EMERGENCY MEDICINE
Payer: MEDICAID

## 2025-02-17 VITALS
RESPIRATION RATE: 14 BRPM | HEART RATE: 74 BPM | OXYGEN SATURATION: 98 % | SYSTOLIC BLOOD PRESSURE: 144 MMHG | DIASTOLIC BLOOD PRESSURE: 82 MMHG

## 2025-02-17 VITALS
HEART RATE: 77 BPM | HEIGHT: 65 IN | RESPIRATION RATE: 15 BRPM | OXYGEN SATURATION: 97 % | DIASTOLIC BLOOD PRESSURE: 94 MMHG | WEIGHT: 169.09 LBS | SYSTOLIC BLOOD PRESSURE: 155 MMHG | TEMPERATURE: 98 F

## 2025-02-17 DIAGNOSIS — Z98.890 OTHER SPECIFIED POSTPROCEDURAL STATES: Chronic | ICD-10-CM

## 2025-02-17 LAB
ALBUMIN SERPL ELPH-MCNC: 3.8 G/DL — SIGNIFICANT CHANGE UP (ref 3.3–5)
ALP SERPL-CCNC: 84 U/L — SIGNIFICANT CHANGE UP (ref 40–120)
ALT FLD-CCNC: 19 U/L — SIGNIFICANT CHANGE UP (ref 10–45)
ANION GAP SERPL CALC-SCNC: 11 MMOL/L — SIGNIFICANT CHANGE UP (ref 5–17)
AST SERPL-CCNC: 14 U/L — SIGNIFICANT CHANGE UP (ref 10–40)
BASOPHILS # BLD AUTO: 0.03 K/UL — SIGNIFICANT CHANGE UP (ref 0–0.2)
BASOPHILS NFR BLD AUTO: 0.6 % — SIGNIFICANT CHANGE UP (ref 0–2)
BILIRUB SERPL-MCNC: 0.7 MG/DL — SIGNIFICANT CHANGE UP (ref 0.2–1.2)
BUN SERPL-MCNC: 13 MG/DL — SIGNIFICANT CHANGE UP (ref 7–23)
CALCIUM SERPL-MCNC: 9 MG/DL — SIGNIFICANT CHANGE UP (ref 8.4–10.5)
CHLORIDE SERPL-SCNC: 107 MMOL/L — SIGNIFICANT CHANGE UP (ref 96–108)
CO2 SERPL-SCNC: 26 MMOL/L — SIGNIFICANT CHANGE UP (ref 22–31)
CREAT SERPL-MCNC: 0.57 MG/DL — SIGNIFICANT CHANGE UP (ref 0.5–1.3)
EGFR: 102 ML/MIN/1.73M2 — SIGNIFICANT CHANGE UP
EOSINOPHIL # BLD AUTO: 0.03 K/UL — SIGNIFICANT CHANGE UP (ref 0–0.5)
EOSINOPHIL NFR BLD AUTO: 0.6 % — SIGNIFICANT CHANGE UP (ref 0–6)
GLUCOSE SERPL-MCNC: 119 MG/DL — HIGH (ref 70–99)
HCT VFR BLD CALC: 37.3 % — SIGNIFICANT CHANGE UP (ref 34.5–45)
HGB BLD-MCNC: 12.9 G/DL — SIGNIFICANT CHANGE UP (ref 11.5–15.5)
IMM GRANULOCYTES NFR BLD AUTO: 0.2 % — SIGNIFICANT CHANGE UP (ref 0–0.9)
LYMPHOCYTES # BLD AUTO: 1.86 K/UL — SIGNIFICANT CHANGE UP (ref 1–3.3)
LYMPHOCYTES # BLD AUTO: 35.4 % — SIGNIFICANT CHANGE UP (ref 13–44)
MAGNESIUM SERPL-MCNC: 1.8 MG/DL — SIGNIFICANT CHANGE UP (ref 1.6–2.6)
MCHC RBC-ENTMCNC: 30.2 PG — SIGNIFICANT CHANGE UP (ref 27–34)
MCHC RBC-ENTMCNC: 34.6 G/DL — SIGNIFICANT CHANGE UP (ref 32–36)
MCV RBC AUTO: 87.4 FL — SIGNIFICANT CHANGE UP (ref 80–100)
MONOCYTES # BLD AUTO: 0.38 K/UL — SIGNIFICANT CHANGE UP (ref 0–0.9)
MONOCYTES NFR BLD AUTO: 7.2 % — SIGNIFICANT CHANGE UP (ref 2–14)
NEUTROPHILS # BLD AUTO: 2.94 K/UL — SIGNIFICANT CHANGE UP (ref 1.8–7.4)
NEUTROPHILS NFR BLD AUTO: 56 % — SIGNIFICANT CHANGE UP (ref 43–77)
NRBC BLD AUTO-RTO: 0 /100 WBCS — SIGNIFICANT CHANGE UP (ref 0–0)
PLATELET # BLD AUTO: 198 K/UL — SIGNIFICANT CHANGE UP (ref 150–400)
POTASSIUM SERPL-MCNC: 3.6 MMOL/L — SIGNIFICANT CHANGE UP (ref 3.5–5.3)
POTASSIUM SERPL-SCNC: 3.6 MMOL/L — SIGNIFICANT CHANGE UP (ref 3.5–5.3)
PROT SERPL-MCNC: 7.2 G/DL — SIGNIFICANT CHANGE UP (ref 6–8.3)
RBC # BLD: 4.27 M/UL — SIGNIFICANT CHANGE UP (ref 3.8–5.2)
RBC # FLD: 13.3 % — SIGNIFICANT CHANGE UP (ref 10.3–14.5)
SODIUM SERPL-SCNC: 144 MMOL/L — SIGNIFICANT CHANGE UP (ref 135–145)
WBC # BLD: 5.25 K/UL — SIGNIFICANT CHANGE UP (ref 3.8–10.5)
WBC # FLD AUTO: 5.25 K/UL — SIGNIFICANT CHANGE UP (ref 3.8–10.5)

## 2025-02-17 PROCEDURE — 85025 COMPLETE CBC W/AUTO DIFF WBC: CPT

## 2025-02-17 PROCEDURE — 36415 COLL VENOUS BLD VENIPUNCTURE: CPT

## 2025-02-17 PROCEDURE — 93970 EXTREMITY STUDY: CPT

## 2025-02-17 PROCEDURE — 83735 ASSAY OF MAGNESIUM: CPT

## 2025-02-17 PROCEDURE — 80053 COMPREHEN METABOLIC PANEL: CPT

## 2025-02-17 PROCEDURE — 93970 EXTREMITY STUDY: CPT | Mod: 26

## 2025-02-17 PROCEDURE — 99284 EMERGENCY DEPT VISIT MOD MDM: CPT

## 2025-02-17 PROCEDURE — 99284 EMERGENCY DEPT VISIT MOD MDM: CPT | Mod: 25

## 2025-02-17 RX ORDER — BACTERIOSTATIC SODIUM CHLORIDE 0.9 %
1000 VIAL (ML) INJECTION ONCE
Refills: 0 | Status: COMPLETED | OUTPATIENT
Start: 2025-02-17 | End: 2025-02-17

## 2025-02-17 RX ADMIN — Medication 2000 MILLILITER(S): at 09:43

## 2025-02-17 NOTE — ED ADULT NURSE NOTE - NSFALLUNIVINTERV_ED_ALL_ED
Bed/Stretcher in lowest position, wheels locked, appropriate side rails in place/Call bell, personal items and telephone in reach/Instruct patient to call for assistance before getting out of bed/chair/stretcher/Non-slip footwear applied when patient is off stretcher/[place] to call system/Physically safe environment - no spills, clutter or unnecessary equipment/Purposeful proactive rounding/Room/bathroom lighting operational, light cord in reach

## 2025-02-17 NOTE — ED PROVIDER NOTE - CLINICAL SUMMARY MEDICAL DECISION MAKING FREE TEXT BOX
64-year-old female presents emergency department reporting elevated blood pressure.  Patient states that she also has bilateral calf cramping.  Patient states that she saw her cardiologist about 2 weeks ago and was reassured because her blood pressure elevation was secondary to high salt diet.  Patient states that she eats refrain from the high salt diet however her blood pressure readings at home has been 170s over 100s and 150s over 100s.  Patient on metoprolol atorvastatin escitalopram and metformin.  No changes to dosages recently.  No chest pain shortness of breath.  No dizziness lightheadedness.  No nausea vomiting or diarrhea.  No abdominal pain or back pain.  No hematuria or dysuria.  No recent travel.  Remote history of breast cancer.  Exam as stated. Plan for labs with jess. US duplex per pt request as she has concern for DVT. BP not of grave concern. Advise we monitor and pt may f/u with her primary providers. 64-year-old female presents emergency department reporting elevated blood pressure.  Patient states that she also has bilateral calf cramping.  Patient states that she saw her cardiologist about 2 weeks ago and was reassured because her blood pressure elevation was secondary to high salt diet.  Patient states that she eats refrain from the high salt diet however her blood pressure readings at home has been 170s over 100s and 150s over 100s.  Patient on metoprolol atorvastatin escitalopram and metformin.  No changes to dosages recently.  No chest pain shortness of breath.  No dizziness lightheadedness.  No nausea vomiting or diarrhea.  No abdominal pain or back pain.  No hematuria or dysuria.  No recent travel.  Remote history of breast cancer.  Exam as stated. Plan for labs with lytes. US duplex per pt request as she has concern for DVT. BP not of grave concern. Advise we monitor and pt may f/u with her primary providers.    Results WNL. No acute concerns. Advise f/u outpt with cardiologist or primary providers.   1) Follow up with your doctor in 1-2 days  2) Return to the ER for worsening or concerning symptoms

## 2025-02-17 NOTE — ED PROVIDER NOTE - NSFOLLOWUPINSTRUCTIONS_ED_ALL_ED_FT
Your blood pressure in the emergency department is around 153/86. We do not need to emergently start any medicines today however we do recommend following up with your primary care team.   See attached for copy of results. There are no concerns with your results. The ultrasounds of the lower legs do not show any clots. If the cramping continued, repeat your ultrasound in 7-10 days. Be sure to maintain a balanced diet, minimize sodium/salt, and hydrate well.   1) Follow up with your doctor in 1-2 days  2) Return to the ER for worsening or concerning symptoms

## 2025-02-17 NOTE — ED ADULT TRIAGE NOTE - CHIEF COMPLAINT QUOTE
Pt. to ED complaining of hypertension.  Pt. states that she saw her PCP 2/2/25 but was told her blood pressure was okay.  Pt. denies any headache, weakness, dizziness, ringing in ears.  Pt. states she came in today to have her BP checked.  Pt. also states she would like to get "my vitamin levels checked".  Pt. states she did not eat or drink anything this morning to have blood work in emergency room this morning.  Pt. states she has bilateral calf pain when she sleeps for the past 2 weeks worse in her right leg.  Pt. denies any recent travel or prolonged sitting.

## 2025-02-17 NOTE — ED PROVIDER NOTE - PATIENT PORTAL LINK FT
You can access the FollowMyHealth Patient Portal offered by [facility] by registering at the following website: [email or web address]. By joining [employer]’s FollowMyHealth portal, you will also be able to view your health information using other applications (apps) compatible with our system.

## 2025-02-17 NOTE — ED PROVIDER NOTE - OBJECTIVE STATEMENT
64-year-old female presents emergency department reporting elevated blood pressure.  Patient states that she also has bilateral calf cramping.  Patient states that she saw her cardiologist about 2 weeks ago and was reassured because her blood pressure elevation was secondary to high salt diet.  Patient states that she eats refrain from the high salt diet however her blood pressure readings at home has been 170s over 100s and 150s over 100s.  Patient on metoprolol atorvastatin escitalopram and metformin.  No changes to dosages recently.  No chest pain shortness of breath.  No dizziness lightheadedness.  No nausea vomiting or diarrhea.  No abdominal pain or back pain.  No hematuria or dysuria.  No recent travel.  Remote history of breast cancer.

## 2025-02-17 NOTE — ED ADULT TRIAGE NOTE - GLASGOW COMA SCALE: BEST MOTOR RESPONSE, MLM
(M6) obeys commands
No
Infliximab Counseling:  I discussed with the patient the risks of infliximab including but not limited to myelosuppression, immunosuppression, autoimmune hepatitis, demyelinating diseases, lymphoma, and serious infections.  The patient understands that monitoring is required including a PPD at baseline and must alert us or the primary physician if symptoms of infection or other concerning signs are noted.

## 2025-02-17 NOTE — ED PROVIDER NOTE - PHYSICAL EXAMINATION
Vitals: I have reviewed the patients vital signs  General: nontoxic appearing  HEENT: Atraumatic, normocephalic, airway patent  Eyes: EOMI, tracking appropriately  Neck: no tracheal deviation  Chest/Lungs: no trauma, symmetric chest rise, speaking in complete sentences,  no resp distress, clear lungs b/l.   Heart: skin and extremities well perfused, regular rate and rhythm  Abd: soft NT ND  Neuro: A+Ox3, ambulating without difficulty, appears non focal  MSK: strength at baseline in all extremities, no muscle wasting or atrophy, no calf tenderness.   Skin: no cyanosis, no jaundice   /83.

## 2025-02-19 ENCOUNTER — APPOINTMENT (OUTPATIENT)
Dept: CARDIOLOGY | Facility: HOSPITAL | Age: 65
End: 2025-02-19

## 2025-02-19 ENCOUNTER — OUTPATIENT (OUTPATIENT)
Dept: OUTPATIENT SERVICES | Facility: HOSPITAL | Age: 65
LOS: 1 days | End: 2025-02-19
Payer: SELF-PAY

## 2025-02-19 VITALS
HEART RATE: 83 BPM | OXYGEN SATURATION: 95 % | SYSTOLIC BLOOD PRESSURE: 118 MMHG | DIASTOLIC BLOOD PRESSURE: 80 MMHG | HEIGHT: 63 IN

## 2025-02-19 DIAGNOSIS — I25.10 ATHEROSCLEROTIC HEART DISEASE OF NATIVE CORONARY ARTERY WITHOUT ANGINA PECTORIS: ICD-10-CM

## 2025-02-19 DIAGNOSIS — Z98.890 OTHER SPECIFIED POSTPROCEDURAL STATES: Chronic | ICD-10-CM

## 2025-02-19 PROCEDURE — G0463: CPT

## 2025-02-20 ENCOUNTER — OUTPATIENT (OUTPATIENT)
Dept: OUTPATIENT SERVICES | Facility: HOSPITAL | Age: 65
LOS: 1 days | End: 2025-02-20
Payer: SELF-PAY

## 2025-02-20 ENCOUNTER — APPOINTMENT (OUTPATIENT)
Age: 65
End: 2025-02-20

## 2025-02-20 VITALS
BODY MASS INDEX: 30.47 KG/M2 | RESPIRATION RATE: 14 BRPM | SYSTOLIC BLOOD PRESSURE: 121 MMHG | HEART RATE: 88 BPM | DIASTOLIC BLOOD PRESSURE: 81 MMHG | WEIGHT: 172 LBS | OXYGEN SATURATION: 98 % | TEMPERATURE: 97.9 F

## 2025-02-20 DIAGNOSIS — R03.0 ELEVATED BLOOD-PRESSURE READING, W/OUT DIAGNOSIS OF HYPERTENSION: ICD-10-CM

## 2025-02-20 DIAGNOSIS — R03.0 ELEVATED BLOOD-PRESSURE READING, WITHOUT DIAGNOSIS OF HYPERTENSION: ICD-10-CM

## 2025-02-20 DIAGNOSIS — I10 ESSENTIAL (PRIMARY) HYPERTENSION: ICD-10-CM

## 2025-02-20 DIAGNOSIS — Z00.00 ENCOUNTER FOR GENERAL ADULT MEDICAL EXAMINATION WITHOUT ABNORMAL FINDINGS: ICD-10-CM

## 2025-02-20 DIAGNOSIS — Z98.890 OTHER SPECIFIED POSTPROCEDURAL STATES: Chronic | ICD-10-CM

## 2025-02-20 PROCEDURE — G0463: CPT

## 2025-03-19 ENCOUNTER — APPOINTMENT (OUTPATIENT)
Dept: CARDIOLOGY | Facility: HOSPITAL | Age: 65
End: 2025-03-19

## 2025-03-19 ENCOUNTER — APPOINTMENT (OUTPATIENT)
Dept: RADIOLOGY | Facility: HOSPITAL | Age: 65
End: 2025-03-19

## 2025-03-19 ENCOUNTER — OUTPATIENT (OUTPATIENT)
Dept: OUTPATIENT SERVICES | Facility: HOSPITAL | Age: 65
LOS: 1 days | End: 2025-03-19
Payer: SELF-PAY

## 2025-03-19 ENCOUNTER — RESULT REVIEW (OUTPATIENT)
Age: 65
End: 2025-03-19

## 2025-03-19 ENCOUNTER — APPOINTMENT (OUTPATIENT)
Age: 65
End: 2025-03-19
Payer: COMMERCIAL

## 2025-03-19 VITALS
TEMPERATURE: 97.8 F | RESPIRATION RATE: 16 BRPM | OXYGEN SATURATION: 98 % | DIASTOLIC BLOOD PRESSURE: 67 MMHG | SYSTOLIC BLOOD PRESSURE: 106 MMHG | HEART RATE: 84 BPM | BODY MASS INDEX: 29.76 KG/M2 | WEIGHT: 168 LBS

## 2025-03-19 VITALS
HEART RATE: 83 BPM | OXYGEN SATURATION: 96 % | DIASTOLIC BLOOD PRESSURE: 74 MMHG | SYSTOLIC BLOOD PRESSURE: 120 MMHG | BODY MASS INDEX: 29.94 KG/M2 | WEIGHT: 169 LBS

## 2025-03-19 DIAGNOSIS — Z98.890 OTHER SPECIFIED POSTPROCEDURAL STATES: Chronic | ICD-10-CM

## 2025-03-19 DIAGNOSIS — M25.512 PAIN IN LEFT SHOULDER: ICD-10-CM

## 2025-03-19 DIAGNOSIS — R06.00 DYSPNEA, UNSPECIFIED: ICD-10-CM

## 2025-03-19 DIAGNOSIS — Z00.00 ENCOUNTER FOR GENERAL ADULT MEDICAL EXAMINATION WITHOUT ABNORMAL FINDINGS: ICD-10-CM

## 2025-03-19 PROCEDURE — G0463: CPT

## 2025-03-19 PROCEDURE — 73030 X-RAY EXAM OF SHOULDER: CPT | Mod: 26,LT

## 2025-03-19 PROCEDURE — 93005 ELECTROCARDIOGRAM TRACING: CPT

## 2025-03-19 PROCEDURE — 73030 X-RAY EXAM OF SHOULDER: CPT

## 2025-03-26 DIAGNOSIS — R06.00 DYSPNEA, UNSPECIFIED: ICD-10-CM

## 2025-03-29 ENCOUNTER — OUTPATIENT (OUTPATIENT)
Dept: OUTPATIENT SERVICES | Facility: HOSPITAL | Age: 65
LOS: 1 days | End: 2025-03-29
Payer: SELF-PAY

## 2025-03-29 ENCOUNTER — APPOINTMENT (OUTPATIENT)
Age: 65
End: 2025-03-29

## 2025-03-29 VITALS
SYSTOLIC BLOOD PRESSURE: 130 MMHG | WEIGHT: 170 LBS | BODY MASS INDEX: 30.11 KG/M2 | TEMPERATURE: 98.1 F | RESPIRATION RATE: 16 BRPM | HEART RATE: 76 BPM | DIASTOLIC BLOOD PRESSURE: 78 MMHG | OXYGEN SATURATION: 95 %

## 2025-03-29 DIAGNOSIS — Z87.898 PERSONAL HISTORY OF OTHER SPECIFIED CONDITIONS: ICD-10-CM

## 2025-03-29 DIAGNOSIS — R10.32 LEFT LOWER QUADRANT PAIN: ICD-10-CM

## 2025-03-29 DIAGNOSIS — R19.6 HALITOSIS: ICD-10-CM

## 2025-03-29 DIAGNOSIS — R82.90 UNSPECIFIED ABNORMAL FINDINGS IN URINE: ICD-10-CM

## 2025-03-29 DIAGNOSIS — Z01.419 ENCOUNTER FOR GYNECOLOGICAL EXAMINATION (GENERAL) (ROUTINE) W/OUT ABNORMAL FINDINGS: ICD-10-CM

## 2025-03-29 DIAGNOSIS — Z98.890 OTHER SPECIFIED POSTPROCEDURAL STATES: Chronic | ICD-10-CM

## 2025-03-29 DIAGNOSIS — R06.09 OTHER FORMS OF DYSPNEA: ICD-10-CM

## 2025-03-29 DIAGNOSIS — R61 GENERALIZED HYPERHIDROSIS: ICD-10-CM

## 2025-03-29 DIAGNOSIS — Z87.19 PERSONAL HISTORY OF OTHER DISEASES OF THE DIGESTIVE SYSTEM: ICD-10-CM

## 2025-03-29 DIAGNOSIS — Z12.11 ENCOUNTER FOR SCREENING FOR MALIGNANT NEOPLASM OF COLON: ICD-10-CM

## 2025-03-29 DIAGNOSIS — M25.512 PAIN IN LEFT SHOULDER: ICD-10-CM

## 2025-03-29 DIAGNOSIS — Z71.89 OTHER SPECIFIED COUNSELING: ICD-10-CM

## 2025-03-29 DIAGNOSIS — H92.02 OTALGIA, LEFT EAR: ICD-10-CM

## 2025-03-29 DIAGNOSIS — T14.8XXA OTHER INJURY OF UNSPECIFIED BODY REGION, INITIAL ENCOUNTER: ICD-10-CM

## 2025-03-29 DIAGNOSIS — Z00.00 ENCOUNTER FOR GENERAL ADULT MEDICAL EXAMINATION WITHOUT ABNORMAL FINDINGS: ICD-10-CM

## 2025-03-29 PROCEDURE — G0463: CPT

## 2025-04-04 ENCOUNTER — OUTPATIENT (OUTPATIENT)
Dept: OUTPATIENT SERVICES | Facility: HOSPITAL | Age: 65
LOS: 1 days | End: 2025-04-04
Payer: COMMERCIAL

## 2025-04-04 ENCOUNTER — OUTPATIENT (OUTPATIENT)
Dept: OUTPATIENT SERVICES | Facility: HOSPITAL | Age: 65
LOS: 1 days | End: 2025-04-04
Payer: MEDICAID

## 2025-04-04 ENCOUNTER — RESULT REVIEW (OUTPATIENT)
Age: 65
End: 2025-04-04

## 2025-04-04 ENCOUNTER — APPOINTMENT (OUTPATIENT)
Dept: CV DIAGNOSTICS | Facility: HOSPITAL | Age: 65
End: 2025-04-04

## 2025-04-04 ENCOUNTER — APPOINTMENT (OUTPATIENT)
Dept: MRI IMAGING | Facility: CLINIC | Age: 65
End: 2025-04-04
Payer: MEDICAID

## 2025-04-04 DIAGNOSIS — Z98.890 OTHER SPECIFIED POSTPROCEDURAL STATES: Chronic | ICD-10-CM

## 2025-04-04 DIAGNOSIS — M25.512 PAIN IN LEFT SHOULDER: ICD-10-CM

## 2025-04-04 DIAGNOSIS — R06.00 DYSPNEA, UNSPECIFIED: ICD-10-CM

## 2025-04-04 PROCEDURE — 93016 CV STRESS TEST SUPVJ ONLY: CPT

## 2025-04-04 PROCEDURE — 93017 CV STRESS TEST TRACING ONLY: CPT

## 2025-04-04 PROCEDURE — 73221 MRI JOINT UPR EXTREM W/O DYE: CPT

## 2025-04-04 PROCEDURE — 93018 CV STRESS TEST I&R ONLY: CPT

## 2025-04-04 PROCEDURE — 73221 MRI JOINT UPR EXTREM W/O DYE: CPT | Mod: 26,LT

## 2025-04-05 ENCOUNTER — APPOINTMENT (OUTPATIENT)
Dept: MRI IMAGING | Facility: HOSPITAL | Age: 65
End: 2025-04-05

## 2025-04-17 ENCOUNTER — OUTPATIENT (OUTPATIENT)
Dept: OUTPATIENT SERVICES | Facility: HOSPITAL | Age: 65
LOS: 1 days | End: 2025-04-17
Payer: SELF-PAY

## 2025-04-17 ENCOUNTER — APPOINTMENT (OUTPATIENT)
Age: 65
End: 2025-04-17

## 2025-04-17 VITALS
TEMPERATURE: 98.1 F | RESPIRATION RATE: 16 BRPM | OXYGEN SATURATION: 99 % | BODY MASS INDEX: 30.47 KG/M2 | WEIGHT: 172 LBS | SYSTOLIC BLOOD PRESSURE: 117 MMHG | HEART RATE: 87 BPM | DIASTOLIC BLOOD PRESSURE: 78 MMHG

## 2025-04-17 DIAGNOSIS — Z98.890 OTHER SPECIFIED POSTPROCEDURAL STATES: Chronic | ICD-10-CM

## 2025-04-17 DIAGNOSIS — M25.512 PAIN IN LEFT SHOULDER: ICD-10-CM

## 2025-04-17 DIAGNOSIS — Z00.00 ENCOUNTER FOR GENERAL ADULT MEDICAL EXAMINATION WITHOUT ABNORMAL FINDINGS: ICD-10-CM

## 2025-04-17 PROCEDURE — G0463: CPT

## 2025-04-29 ENCOUNTER — OUTPATIENT (OUTPATIENT)
Dept: OUTPATIENT SERVICES | Facility: HOSPITAL | Age: 65
LOS: 1 days | End: 2025-04-29
Payer: SELF-PAY

## 2025-04-29 ENCOUNTER — APPOINTMENT (OUTPATIENT)
Dept: GASTROENTEROLOGY | Facility: HOSPITAL | Age: 65
End: 2025-04-29
Payer: COMMERCIAL

## 2025-04-29 VITALS
DIASTOLIC BLOOD PRESSURE: 73 MMHG | TEMPERATURE: 97.9 F | SYSTOLIC BLOOD PRESSURE: 111 MMHG | OXYGEN SATURATION: 98 % | HEIGHT: 63 IN | RESPIRATION RATE: 16 BRPM | WEIGHT: 170 LBS | HEART RATE: 72 BPM | BODY MASS INDEX: 30.12 KG/M2

## 2025-04-29 DIAGNOSIS — E66.811 OBESITY, CLASS 1: ICD-10-CM

## 2025-04-29 DIAGNOSIS — Z98.890 OTHER SPECIFIED POSTPROCEDURAL STATES: Chronic | ICD-10-CM

## 2025-04-29 DIAGNOSIS — K57.90 DIVERTICULOSIS OF INTESTINE, PART UNSPECIFIED, W/OUT PERFORATION OR ABSCESS W/OUT BLEEDING: ICD-10-CM

## 2025-04-29 DIAGNOSIS — D12.6 BENIGN NEOPLASM OF COLON, UNSPECIFIED: ICD-10-CM

## 2025-04-29 DIAGNOSIS — Z80.0 FAMILY HISTORY OF MALIGNANT NEOPLASM OF DIGESTIVE ORGANS: ICD-10-CM

## 2025-04-29 DIAGNOSIS — K21.9 GASTRO-ESOPHAGEAL REFLUX DISEASE W/OUT ESOPHAGITIS: ICD-10-CM

## 2025-04-29 DIAGNOSIS — Z91.89 OTHER SPECIFIED PERSONAL RISK FACTORS, NOT ELSEWHERE CLASSIFIED: ICD-10-CM

## 2025-04-29 DIAGNOSIS — R10.9 UNSPECIFIED ABDOMINAL PAIN: ICD-10-CM

## 2025-04-29 PROCEDURE — ZZZZZ: CPT

## 2025-04-29 PROCEDURE — G0463: CPT

## 2025-04-29 RX ORDER — POLYETHYLENE GLYCOL 3350 AND ELECTROLYTES WITH LEMON FLAVOR 236; 22.74; 6.74; 5.86; 2.97 G/4L; G/4L; G/4L; G/4L; G/4L
236 POWDER, FOR SOLUTION ORAL
Qty: 1 | Refills: 0 | Status: ACTIVE | COMMUNITY
Start: 2025-04-29 | End: 1900-01-01

## 2025-05-01 ENCOUNTER — OUTPATIENT (OUTPATIENT)
Dept: OUTPATIENT SERVICES | Facility: HOSPITAL | Age: 65
LOS: 1 days | End: 2025-05-01
Payer: COMMERCIAL

## 2025-05-01 ENCOUNTER — APPOINTMENT (OUTPATIENT)
Dept: MAMMOGRAPHY | Facility: IMAGING CENTER | Age: 65
End: 2025-05-01

## 2025-05-01 ENCOUNTER — RESULT REVIEW (OUTPATIENT)
Age: 65
End: 2025-05-01

## 2025-05-01 ENCOUNTER — APPOINTMENT (OUTPATIENT)
Dept: ULTRASOUND IMAGING | Facility: IMAGING CENTER | Age: 65
End: 2025-05-01

## 2025-05-01 DIAGNOSIS — C50.919 MALIGNANT NEOPLASM OF UNSPECIFIED SITE OF UNSPECIFIED FEMALE BREAST: ICD-10-CM

## 2025-05-01 DIAGNOSIS — Z98.890 OTHER SPECIFIED POSTPROCEDURAL STATES: Chronic | ICD-10-CM

## 2025-05-01 PROCEDURE — 77067 SCR MAMMO BI INCL CAD: CPT

## 2025-05-01 PROCEDURE — 77063 BREAST TOMOSYNTHESIS BI: CPT | Mod: 26,52

## 2025-05-01 PROCEDURE — 76641 ULTRASOUND BREAST COMPLETE: CPT

## 2025-05-01 PROCEDURE — 77063 BREAST TOMOSYNTHESIS BI: CPT

## 2025-05-01 PROCEDURE — 76641 ULTRASOUND BREAST COMPLETE: CPT | Mod: 26,LT

## 2025-05-01 PROCEDURE — 77067 SCR MAMMO BI INCL CAD: CPT | Mod: 26,LT,52

## 2025-05-02 DIAGNOSIS — E66.811 OBESITY, CLASS 1: ICD-10-CM

## 2025-05-02 DIAGNOSIS — K57.90 DIVERTICULOSIS OF INTESTINE, PART UNSPECIFIED, WITHOUT PERFORATION OR ABSCESS WITHOUT BLEEDING: ICD-10-CM

## 2025-05-02 DIAGNOSIS — D12.6 BENIGN NEOPLASM OF COLON, UNSPECIFIED: ICD-10-CM

## 2025-05-02 DIAGNOSIS — Z80.0 FAMILY HISTORY OF MALIGNANT NEOPLASM OF DIGESTIVE ORGANS: ICD-10-CM

## 2025-05-02 DIAGNOSIS — Z91.89 OTHER SPECIFIED PERSONAL RISK FACTORS, NOT ELSEWHERE CLASSIFIED: ICD-10-CM

## 2025-05-02 DIAGNOSIS — K21.9 GASTRO-ESOPHAGEAL REFLUX DISEASE WITHOUT ESOPHAGITIS: ICD-10-CM

## 2025-05-08 ENCOUNTER — TRANSCRIPTION ENCOUNTER (OUTPATIENT)
Age: 65
End: 2025-05-08